# Patient Record
Sex: FEMALE | Race: WHITE | Employment: OTHER | URBAN - NONMETROPOLITAN AREA
[De-identification: names, ages, dates, MRNs, and addresses within clinical notes are randomized per-mention and may not be internally consistent; named-entity substitution may affect disease eponyms.]

---

## 2017-03-01 ENCOUNTER — HOSPITAL ENCOUNTER (EMERGENCY)
Facility: HOSPITAL | Age: 47
Discharge: HOME | End: 2017-03-01
Attending: PHYSICIAN ASSISTANT | Admitting: PHYSICIAN ASSISTANT
Payer: COMMERCIAL

## 2017-03-01 VITALS
DIASTOLIC BLOOD PRESSURE: 93 MMHG | HEART RATE: 89 BPM | TEMPERATURE: 98.6 F | RESPIRATION RATE: 18 BRPM | SYSTOLIC BLOOD PRESSURE: 173 MMHG | WEIGHT: 160 LBS | OXYGEN SATURATION: 95 % | BODY MASS INDEX: 27.31 KG/M2 | HEIGHT: 64 IN

## 2017-03-01 DIAGNOSIS — R10.31 RIGHT LOWER QUADRANT PAIN: ICD-10-CM

## 2017-03-01 DIAGNOSIS — K59.00 CONSTIPATION, UNSPECIFIED CONSTIPATION TYPE: ICD-10-CM

## 2017-03-01 PROCEDURE — 99283 EMERGENCY DEPT VISIT LOW MDM: CPT | Performed by: PHYSICIAN ASSISTANT

## 2017-03-01 PROCEDURE — 99283 EMERGENCY DEPT VISIT LOW MDM: CPT

## 2017-03-01 PROCEDURE — 74020 ZZHC X-RAY ABDOMEN COMPLETE: CPT | Mod: TC

## 2017-03-01 RX ORDER — OXYCODONE AND ACETAMINOPHEN 5; 325 MG/1; MG/1
1-2 TABLET ORAL EVERY 4 HOURS PRN
COMMUNITY
End: 2020-02-12

## 2017-03-01 ASSESSMENT — ENCOUNTER SYMPTOMS
NAUSEA: 0
ABDOMINAL DISTENTION: 0
ACTIVITY CHANGE: 0
ABDOMINAL PAIN: 1
FEVER: 0
DIARRHEA: 0
VOMITING: 0
CHILLS: 0
APPETITE CHANGE: 0
CONSTIPATION: 1

## 2017-03-01 NOTE — ED AVS SNAPSHOT
[facility]    [address]    [place] [address]    Phone:  [phone]                                       Veronica Ansari   MRN: [id number]    Department:  [facility]   Date of Visit:  3/1/2017           After Visit Summary Signature Page     I have received my discharge instructions, and my questions have been answered. I have discussed any challenges I see with this plan with the nurse or doctor.    ..........................................................................................................................................  Patient/Patient Representative Signature      ..........................................................................................................................................  Patient Representative Print Name and Relationship to Patient    ..................................................               ................................................  Date                                            Time    ..........................................................................................................................................  Reviewed by Signature/Title    ...................................................              ..............................................  Date                                                            Time

## 2017-03-02 NOTE — PLAN OF CARE
Opened chart per provider's request to call pt and update her that if no bm or worsening pain today to return here for ct. Pt agrees that she will do so if she has no bm or is still having abd pain.

## 2017-03-02 NOTE — ED NOTES
Pt doesn't want to wait for final read on abd xrays Ka will call them later with results.  Discharge instructions given to pt and .  They will return with any concerns or increase in pain.  Pt ambulatory with sling to left shoulder from rotator cuff repair done this Monday.

## 2017-03-02 NOTE — ED PROVIDER NOTES
History     Chief Complaint   Patient presents with     Constipation     shoulder surgery on Monday, no BM since then, has been taking stool softeners for 2 days, tried suppository today-no results     The history is provided by the patient.     Veronica Ansari is a 47 year old female who presented to the ED ambulatory for evaluation of constipation.  She had left shoulder surgery on Monday and has not had a BM in 3 days.  Has tried OTC stool softeners.  Developed some mild RLQ pain today.  No fevers.  No vomiting.  Passing gas.    Past Medical History   Diagnosis Date     Cervicalgia      Deviated nasal septum      Embolism and thrombosis (H)      unspecified site     Fibrocystic breast disease      Migraine      Prothrombin mutation (H)      Sinusitis       Past Surgical History   Procedure Laterality Date     Dilate cervix, hysteroscopy, ablate endometrium, combined N/A 5/11/2016     Procedure: COMBINED DILATE CERVIX, HYSTEROSCOPY, ABLATE ENDOMETRIUM;  Surgeon: Pacheco Curry MD;  Location: [facility]      Social History     Social History     Marital status:      Spouse name: N/A     Number of children: N/A     Years of education: N/A     Social History Main Topics     Smoking status: Former Smoker     Years: 3.00     Types: Cigarettes     Quit date: 8/22/1988     Smokeless tobacco: Never Used     Alcohol use 0.0 oz/week     0 Standard drinks or equivalent per week      Comment: Occassional     Drug use: No     Sexual activity: Not Asked     Other Topics Concern     None     Social History Narrative    No family history on file.    I have reviewed the Medications, Allergies, Past Medical and Surgical History, and Social History in the Epic system.    Review of Systems   Constitutional: Negative for activity change, appetite change, chills and fever.   Gastrointestinal: Positive for abdominal pain and constipation. Negative for abdominal distention, diarrhea, nausea and vomiting.   Genitourinary: Negative.   "      Physical Exam   BP: 173/93  Pulse: 89  Temp: 98.5  F (36.9  C)  Resp: 20  Height: 162.6 cm (5' 4\")  Weight: 72.6 kg (160 lb)  SpO2: 95 %  Physical Exam   Constitutional: She is oriented to person, place, and time. She appears well-developed and well-nourished. No distress.   Pleasant and talkative    Eyes: Conjunctivae and EOM are normal.   Cardiovascular: Normal rate and regular rhythm.    Pulmonary/Chest: Effort normal.   Abdominal: Soft. There is tenderness. There is no guarding.   Mild RLQ pain on deep palpation.  Otherwise unremarkable.    Neurological: She is alert and oriented to person, place, and time.   Skin: Skin is warm and dry.   Psychiatric: She has a normal mood and affect.   Nursing note and vitals reviewed.      ED Course     ED Course     Procedures             Critical Care time:  none               Labs Ordered and Resulted from Time of ED Arrival Up to the Time of Departure from the ED - No data to display    Assessments & Plan (with Medical Decision Making)   Abdominal imaging shows some enlarged bowel loops consistent with ileus.  Ms. Ansari tells me that she is passing flatus.  NO vomiting. Very mild right sided pain that is mild on deep palpation exam.  Gait is very smooth and she demonstrates no pain while conversing.  She almost certainly has opiate induced ileus/constipation.  I had a long and detailed discussion regarding her symptoms.  I discussed constipation at length.  Enema had little relief.  Mutually agreed to defer rectal.  Ms. Ansari looks well and does not appear to be in any discomfort.  She has no vomiting, she's passing gas, no distention, no fevers, etc.  I discussed the ease of simply ordering a CT scan.  This would rule out obstruction and appendicitis.  However, I do not believe that this would be in her best interest at this time in the ED.  Labs would offer no value.  She is postop and will almost certainly have leukocytosis.  Ms. Ansari completely agreed.  We " will trial mag citrate or miralax, and time. However, I stressed at length that she needed to return HERE for ANY worsening pain, fevers, changes in pain quality, location, or character, distention, loss of flatus, or simply return here for ANY other concerns or questions.     Discussed briefly with Dr. Camarena who agreed with the plan.     I have reviewed the nursing notes.    I have reviewed the findings, diagnosis, plan and need for follow up with the patient.    Discharge Medication List as of 3/1/2017  7:27 PM          Final diagnoses:   Constipation, unspecified constipation type   Right lower quadrant pain       3/1/2017   [facility]     Charito Duque PA-C  03/01/17 [id number]       Charito Duque PA-C  03/01/17 [id number]

## 2017-10-04 ENCOUNTER — TRANSFERRED RECORDS (OUTPATIENT)
Dept: HEALTH INFORMATION MANAGEMENT | Facility: CLINIC | Age: 47
End: 2017-10-04

## 2018-06-01 ENCOUNTER — TRANSFERRED RECORDS (OUTPATIENT)
Dept: HEALTH INFORMATION MANAGEMENT | Facility: HOSPITAL | Age: 48
End: 2018-06-01

## 2018-10-08 ENCOUNTER — TRANSFERRED RECORDS (OUTPATIENT)
Dept: HEALTH INFORMATION MANAGEMENT | Facility: CLINIC | Age: 48
End: 2018-10-08

## 2018-10-09 ENCOUNTER — TRANSFERRED RECORDS (OUTPATIENT)
Dept: HEALTH INFORMATION MANAGEMENT | Facility: CLINIC | Age: 48
End: 2018-10-09

## 2018-10-17 ENCOUNTER — HOSPITAL ENCOUNTER (OUTPATIENT)
Dept: NUCLEAR MEDICINE | Facility: HOSPITAL | Age: 48
End: 2018-10-17
Attending: FAMILY MEDICINE
Payer: COMMERCIAL

## 2018-10-17 ENCOUNTER — HOSPITAL ENCOUNTER (OUTPATIENT)
Dept: NUCLEAR MEDICINE | Facility: HOSPITAL | Age: 48
Discharge: HOME | End: 2018-10-17
Attending: FAMILY MEDICINE | Admitting: FAMILY MEDICINE
Payer: COMMERCIAL

## 2018-10-17 DIAGNOSIS — R14.0 POSTPRANDIAL ABDOMINAL BLOATING: ICD-10-CM

## 2018-10-17 PROCEDURE — A9537 TC99M MEBROFENIN: HCPCS | Performed by: RADIOLOGY

## 2018-10-17 PROCEDURE — 34300033 ZZH RX 343: Performed by: RADIOLOGY

## 2018-10-17 PROCEDURE — 78227 HEPATOBIL SYST IMAGE W/DRUG: CPT | Mod: TC

## 2018-10-17 RX ORDER — KIT FOR THE PREPARATION OF TECHNETIUM TC 99M MEBROFENIN 45 MG/10ML
5 INJECTION, POWDER, LYOPHILIZED, FOR SOLUTION INTRAVENOUS ONCE
Status: COMPLETED | OUTPATIENT
Start: 2018-10-17 | End: 2018-10-17

## 2018-10-17 RX ADMIN — MEBROFENIN 5 MILLICURIE: 45 INJECTION, POWDER, LYOPHILIZED, FOR SOLUTION INTRAVENOUS at 12:15

## 2019-03-12 ENCOUNTER — TRANSFERRED RECORDS (OUTPATIENT)
Dept: HEALTH INFORMATION MANAGEMENT | Facility: CLINIC | Age: 49
End: 2019-03-12

## 2019-05-03 ENCOUNTER — TRANSFERRED RECORDS (OUTPATIENT)
Dept: HEALTH INFORMATION MANAGEMENT | Facility: CLINIC | Age: 49
End: 2019-05-03

## 2019-10-10 ENCOUNTER — TRANSFERRED RECORDS (OUTPATIENT)
Dept: HEALTH INFORMATION MANAGEMENT | Facility: HOSPITAL | Age: 49
End: 2019-10-10

## 2019-10-10 ENCOUNTER — TRANSFERRED RECORDS (OUTPATIENT)
Dept: HEALTH INFORMATION MANAGEMENT | Facility: CLINIC | Age: 49
End: 2019-10-10

## 2019-10-10 LAB
CHOLEST SERPL-MCNC: 174 MG/DL (ref 114–200)
HDLC SERPL-MCNC: 47 MG/DL (ref 40–60)
LDLC SERPL CALC-MCNC: 100 MG/DL
PAP-ABSTRACT: NORMAL
TRIGL SERPL-MCNC: 136 MG/DL (ref 10–200)

## 2019-11-18 ENCOUNTER — TRANSFERRED RECORDS (OUTPATIENT)
Dept: HEALTH INFORMATION MANAGEMENT | Facility: CLINIC | Age: 49
End: 2019-11-18

## 2020-02-05 PROBLEM — K58.0 IRRITABLE BOWEL SYNDROME WITH DIARRHEA: Status: ACTIVE | Noted: 2017-11-14

## 2020-02-05 PROBLEM — Z98.890 S/P ABDOMINOPLASTY: Status: ACTIVE | Noted: 2018-04-02
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
## 2020-02-12 ENCOUNTER — OFFICE VISIT (OUTPATIENT)
Dept: FAMILY MEDICINE | Facility: OTHER | Age: 50
End: 2020-02-12
Attending: FAMILY MEDICINE
Payer: COMMERCIAL

## 2020-02-12 VITALS
HEART RATE: 70 BPM | TEMPERATURE: 97.7 F | DIASTOLIC BLOOD PRESSURE: 72 MMHG | SYSTOLIC BLOOD PRESSURE: 114 MMHG | WEIGHT: 168 LBS | BODY MASS INDEX: 28.68 KG/M2 | HEIGHT: 64 IN | OXYGEN SATURATION: 98 %

## 2020-02-12 DIAGNOSIS — K21.9 GASTROESOPHAGEAL REFLUX DISEASE, ESOPHAGITIS PRESENCE NOT SPECIFIED: ICD-10-CM

## 2020-02-12 DIAGNOSIS — J01.01 ACUTE RECURRENT MAXILLARY SINUSITIS: ICD-10-CM

## 2020-02-12 DIAGNOSIS — J32.9 CHRONIC CONGESTION OF PARANASAL SINUS: ICD-10-CM

## 2020-02-12 DIAGNOSIS — K58.0 IRRITABLE BOWEL SYNDROME WITH DIARRHEA: ICD-10-CM

## 2020-02-12 DIAGNOSIS — Z00.00 ENCOUNTER FOR MEDICAL EXAMINATION TO ESTABLISH CARE: Primary | ICD-10-CM

## 2020-02-12 PROCEDURE — 99203 OFFICE O/P NEW LOW 30 MIN: CPT | Performed by: FAMILY MEDICINE

## 2020-02-12 RX ORDER — FAMOTIDINE 20 MG/1
20 TABLET, FILM COATED ORAL
COMMUNITY
End: 2020-10-06

## 2020-02-12 RX ORDER — FLUTICASONE PROPIONATE 50 MCG
SPRAY, SUSPENSION (ML) NASAL
COMMUNITY
Start: 2019-12-08 | End: 2020-10-06

## 2020-02-12 ASSESSMENT — PAIN SCALES - GENERAL: PAINLEVEL: MILD PAIN (3)

## 2020-02-12 ASSESSMENT — MIFFLIN-ST. JEOR: SCORE: 1362.04

## 2020-02-12 NOTE — PATIENT INSTRUCTIONS
Add trial of fiber supplement - Metamucil, Citrucel.    Referral to ENT to evaluate the sinuses.  Complete antibiotic course.

## 2020-02-12 NOTE — NURSING NOTE
"Chief Complaint   Patient presents with     Establish Care       Initial /72 (BP Location: Right arm, Patient Position: Sitting, Cuff Size: Adult Regular)   Pulse 70   Temp 97.7  F (36.5  C) (Tympanic)   Ht 1.626 m (5' 4\")   Wt 76.2 kg (168 lb)   SpO2 98%   BMI 28.84 kg/m   Estimated body mass index is 28.84 kg/m  as calculated from the following:    Height as of this encounter: 1.626 m (5' 4\").    Weight as of this encounter: 76.2 kg (168 lb).  Medication Reconciliation: complete  Bertha Gary LPN  "

## 2020-02-19 ENCOUNTER — OFFICE VISIT (OUTPATIENT)
Dept: OTOLARYNGOLOGY | Facility: OTHER | Age: 50
End: 2020-02-19
Attending: FAMILY MEDICINE
Payer: COMMERCIAL

## 2020-02-19 VITALS
TEMPERATURE: 97.8 F | HEIGHT: 64 IN | WEIGHT: 168 LBS | OXYGEN SATURATION: 97 % | DIASTOLIC BLOOD PRESSURE: 78 MMHG | BODY MASS INDEX: 28.68 KG/M2 | HEART RATE: 93 BPM | SYSTOLIC BLOOD PRESSURE: 112 MMHG

## 2020-02-19 DIAGNOSIS — J32.9 CHRONIC CONGESTION OF PARANASAL SINUS: ICD-10-CM

## 2020-02-19 DIAGNOSIS — J34.2 NASAL SEPTAL DEVIATION: Primary | ICD-10-CM

## 2020-02-19 PROCEDURE — 99213 OFFICE O/P EST LOW 20 MIN: CPT | Mod: 25 | Performed by: NURSE PRACTITIONER

## 2020-02-19 PROCEDURE — 31231 NASAL ENDOSCOPY DX: CPT | Performed by: NURSE PRACTITIONER

## 2020-02-19 RX ORDER — FLUTICASONE PROPIONATE 50 MCG
2 SPRAY, SUSPENSION (ML) NASAL DAILY
Qty: 18.2 ML | Refills: 3 | Status: SHIPPED | OUTPATIENT
Start: 2020-02-19 | End: 2020-03-06

## 2020-02-19 RX ORDER — BUDESONIDE 0.5 MG/2ML
INHALANT ORAL
Qty: 1 BOX | Refills: 1 | Status: SHIPPED | OUTPATIENT
Start: 2020-02-19 | End: 2020-10-06

## 2020-02-19 ASSESSMENT — MIFFLIN-ST. JEOR: SCORE: 1357.04

## 2020-02-19 ASSESSMENT — PAIN SCALES - GENERAL: PAINLEVEL: MODERATE PAIN (4)

## 2020-02-19 NOTE — PATIENT INSTRUCTIONS
Budesonide rinses:  Budesonide nasal saline irrigation per instructions:  -Obtain Leif Med Sinus rinse over the counter.    -Use warm distilled water and 2 packets of the salt solution that comes with the bottle, dissolve in bottle up to the 240 mL garrett.  -Add 1 vial of budesonide.  -Irrigate each side of your nose leaning over the sink, using 1/3 to 1/2 the volume of the bottle in each nostril every irrigation.  Irrigate 2 times daily.  -If additional rinses are needed/recommended, you may use the plan Leif Med Sinus irrigation without the use of added budesonide.     Continue flonase  2 spray 1 time daily
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
## 2020-02-19 NOTE — NURSING NOTE
"Chief Complaint   Patient presents with     Sinusitis     Patient referred by Dr. KIKO Ruiz for chronic congestion of paranasal sinus       Initial /78   Pulse 93   Temp 97.8  F (36.6  C) (Tympanic)   Ht 1.626 m (5' 4\")   Wt 76.2 kg (168 lb)   SpO2 97%   BMI 28.84 kg/m   Estimated body mass index is 28.84 kg/m  as calculated from the following:    Height as of this encounter: 1.626 m (5' 4\").    Weight as of this encounter: 76.2 kg (168 lb).  Medication Reconciliation: complete  Tammy Kendall LPN  "
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
## 2020-03-03 ENCOUNTER — TELEPHONE (OUTPATIENT)
Dept: OTOLARYNGOLOGY | Facility: OTHER | Age: 50
End: 2020-03-03

## 2020-03-03 ENCOUNTER — HOSPITAL ENCOUNTER (OUTPATIENT)
Dept: CT IMAGING | Facility: HOSPITAL | Age: 50
Discharge: HOME | End: 2020-03-03
Attending: NURSE PRACTITIONER | Admitting: NURSE PRACTITIONER
Payer: COMMERCIAL

## 2020-03-03 DIAGNOSIS — J32.9 CHRONIC CONGESTION OF PARANASAL SINUS: ICD-10-CM

## 2020-03-03 PROCEDURE — 70486 CT MAXILLOFACIAL W/O DYE: CPT | Mod: TC

## 2020-03-03 NOTE — TELEPHONE ENCOUNTER
Carol,    Left message with patient to follow up with Dr Richardson in a surgical consult for septoplasty.    Carie

## 2020-03-04 ENCOUNTER — MYC MEDICAL ADVICE (OUTPATIENT)
Dept: FAMILY MEDICINE | Facility: OTHER | Age: 50
End: 2020-03-04

## 2020-03-04 ENCOUNTER — TELEPHONE (OUTPATIENT)
Dept: FAMILY MEDICINE | Facility: OTHER | Age: 50
End: 2020-03-04

## 2020-03-04 NOTE — TELEPHONE ENCOUNTER
Left msg on personal voice mail to call back r/t how sinuses are and if needing a different antibiotic from . Bertha Gary LPN

## 2020-03-04 NOTE — TELEPHONE ENCOUNTER
Called pt and confirmed sinuses are better but pt is still having the side effects of loose stools from the one prior. Pt wondering what she should do. Please advise. Thank you. Bertha Gary LPN

## 2020-03-06 ENCOUNTER — OFFICE VISIT (OUTPATIENT)
Dept: FAMILY MEDICINE | Facility: OTHER | Age: 50
End: 2020-03-06
Attending: FAMILY MEDICINE
Payer: COMMERCIAL

## 2020-03-06 VITALS
WEIGHT: 165 LBS | TEMPERATURE: 97.5 F | BODY MASS INDEX: 28.17 KG/M2 | RESPIRATION RATE: 19 BRPM | HEART RATE: 71 BPM | SYSTOLIC BLOOD PRESSURE: 126 MMHG | DIASTOLIC BLOOD PRESSURE: 70 MMHG | HEIGHT: 64 IN | OXYGEN SATURATION: 99 %

## 2020-03-06 DIAGNOSIS — R19.7 DIARRHEA, UNSPECIFIED TYPE: Primary | ICD-10-CM

## 2020-03-06 PROCEDURE — 99213 OFFICE O/P EST LOW 20 MIN: CPT | Performed by: FAMILY MEDICINE

## 2020-03-06 ASSESSMENT — MIFFLIN-ST. JEOR: SCORE: 1343.44

## 2020-03-06 ASSESSMENT — PAIN SCALES - GENERAL: PAINLEVEL: MILD PAIN (2)

## 2020-03-06 NOTE — PATIENT INSTRUCTIONS
Return kit for c diff testing.  Probiotic, anti-diarrheal, bland diet, hydration.  Follow up as needed.

## 2020-03-06 NOTE — PROGRESS NOTES
Subjective     Veronica Ansari is a 50 year old female who presents to clinic today for the following health issues:    HPI   Diarrhea      Duration: ongoing     Description:       Consistency of stool: pasty and formed       Blood in stool: no        Number of loose stools past 24 hours: 3    Intensity:  mild, 2/10    Accompanying signs and symptoms:       Fever: no        Nausea/vomitting: no        Abdominal pain: YES       Weight loss: no     History (recent antibiotics or travel/ill contacts/med changes/testing done): no    Precipitating or alleviating factors: None    Therapies tried and outcome: Imodium right ear      Treated 2/12/20 with Augmentin for sinusitis; then saw ENT    Started antibiotics, started diarrhea with 3 days    Did not finish antibiotics, 5 pills remaining, sinusitis resolved    Diarrhea became worse, 6-8 times daily; foul smelling    Immodium while on vacation    No blood, tarry, black    Still 6 times daily, stool is more firm, not a lot of volume    Increased gas    RUQ/RLQ Abdominal pain before bowel, relieved with bowel movement    Urination normal    History of diverticulitis - does not feel the same    Elimination diet prior to onset of symptoms with GI improvement           Current Outpatient Medications   Medication     Biotin 61470 MCG TBDP     famotidine (PEPCID) 20 MG tablet     budesonide 0.5 MG/2ML IN neb solution     fluticasone (FLONASE) 50 MCG/ACT nasal spray     No current facility-administered medications for this visit.        Patient Active Problem List   Diagnosis     Excessive or frequent menstruation     Sinusitis, chronic     Prothrombin [id number] mutation (H)     S/P abdominoplasty     Irritable bowel syndrome with diarrhea     Iron deficiency     H/O deep venous thrombosis     Cervicalgia     Deviated nasal septum     Fibrocystic breast disease     Past Surgical History:   Procedure Laterality Date     ABDOMINOPLASTY       C BREAST AUGMENTATION       C LAP  "CHOLECYSTOSOMY        SECTION      x2     COLONOSCOPY - HIM SCAN  2011    Colonoscopy 2011     COLONOSCOPY - HIM SCAN  2018    Procedure:  Colonoscopy diagnostic with polypectomy and biopsies;  Surgeon:  Danelle Swift MD;  Location:  [facility] OR     COLONOSCOPY - HIM SCAN  2007    Colonoscopy, flex, diagnostic     DILATE CERVIX, HYSTEROSCOPY, ABLATE ENDOMETRIUM, COMBINED N/A 2016    Procedure: COMBINED DILATE CERVIX, HYSTEROSCOPY, ABLATE ENDOMETRIUM;  Surgeon: Pacheco Curry MD;  Location: [facility] OR     ESOPHAGOGASTRODUODENOSCOPY      negative       Social History     Tobacco Use     Smoking status: Former Smoker     Years: 3.00     Types: Cigarettes     Last attempt to quit: 1988     Years since quittin.5     Smokeless tobacco: Never Used   Substance Use Topics     Alcohol use: Yes     Alcohol/week: 0.0 standard drinks     Comment: Occassional     No family history on file.          Reviewed and updated as needed this visit by Provider         Review of Systems   ROS COMP: Constitutional, HEENT, cardiovascular, pulmonary, gi and gu systems are negative, except as otherwise noted.      Objective    /70   Pulse 71   Temp 97.5  F (36.4  C) (Tympanic)   Resp 19   Ht 1.626 m (5' 4\")   Wt 74.8 kg (165 lb)   SpO2 99%   BMI 28.32 kg/m    Body mass index is 28.32 kg/m .  Physical Exam   GENERAL: healthy, alert and no distress  NECK: no adenopathy, no asymmetry, masses, or scars and thyroid normal to palpation  RESP: lungs clear to auscultation - no rales, rhonchi or wheezes  CV: regular rate and rhythm, normal S1 S2, no S3 or S4, no murmur, click or rub, no peripheral edema and peripheral pulses strong  ABDOMEN: no organomegaly or masses, bowel sounds normal and mild tenderness RLQ; no rebound or guarding  MS: no gross musculoskeletal defects noted, no edema  SKIN: no suspicious lesions or rashes  PSYCH: mentation appears normal, affect normal/bright    Diagnostic Test " "Results:  C diff pending        Assessment & Plan       ICD-10-CM    1. Diarrhea, unspecified type R19.7 Clostridium difficile Toxin B PCR        BMI:   Estimated body mass index is 28.32 kg/m  as calculated from the following:    Height as of this encounter: 1.626 m (5' 4\").    Weight as of this encounter: 74.8 kg (165 lb).     Likely IBS with addition of antibiotic associated diarrhea.  Less likely C diff.      Patient Instructions   Return kit for c diff testing.  Probiotic, anti-diarrheal, bland diet, hydration.  Follow up as needed.      No follow-ups on file.    Jenelle Rivera MD  [facility] [place]        "

## 2020-03-06 NOTE — NURSING NOTE
"Chief Complaint   Patient presents with     Diarrhea       Initial /70   Pulse 71   Temp 97.5  F (36.4  C) (Tympanic)   Resp 19   Ht 1.626 m (5' 4\")   Wt 74.8 kg (165 lb)   SpO2 99%   BMI 28.32 kg/m   Estimated body mass index is 28.32 kg/m  as calculated from the following:    Height as of this encounter: 1.626 m (5' 4\").    Weight as of this encounter: 74.8 kg (165 lb).  Medication Reconciliation: complete  Sofia Soliman LPN    "

## 2020-03-11 ENCOUNTER — TELEPHONE (OUTPATIENT)
Dept: FAMILY MEDICINE | Facility: OTHER | Age: 50
End: 2020-03-11

## 2020-03-11 NOTE — TELEPHONE ENCOUNTER
Received letter from Dr. Cee, [facility].  Patient with history of recurrent alternating subconjunctival hemorrhages.  Possibly related to anemia.  Less frequent when on iron supplement.  Advising additional lab evaluation for other bleeding disorders as well.  Please notify patient.  Can follow up in clinic to discuss additional lab testing at her convenience.

## 2020-03-12 ENCOUNTER — OFFICE VISIT (OUTPATIENT)
Dept: FAMILY MEDICINE | Facility: OTHER | Age: 50
End: 2020-03-12
Attending: FAMILY MEDICINE
Payer: COMMERCIAL

## 2020-03-12 ENCOUNTER — TELEPHONE (OUTPATIENT)
Dept: FAMILY MEDICINE | Facility: OTHER | Age: 50
End: 2020-03-12

## 2020-03-12 VITALS
RESPIRATION RATE: 17 BRPM | HEIGHT: 64 IN | BODY MASS INDEX: 28.85 KG/M2 | DIASTOLIC BLOOD PRESSURE: 87 MMHG | SYSTOLIC BLOOD PRESSURE: 118 MMHG | HEART RATE: 84 BPM | OXYGEN SATURATION: 99 % | TEMPERATURE: 97.2 F | WEIGHT: 169 LBS

## 2020-03-12 DIAGNOSIS — R19.7 DIARRHEA, UNSPECIFIED TYPE: ICD-10-CM

## 2020-03-12 DIAGNOSIS — H11.33 SUBCONJUNCTIVAL HEMORRHAGE, BILATERAL: Primary | ICD-10-CM

## 2020-03-12 DIAGNOSIS — A04.72 C. DIFFICILE COLITIS: ICD-10-CM

## 2020-03-12 LAB
APTT PPP: 28 SEC (ref 22–37)
BASOPHILS # BLD AUTO: 0.1 10E9/L (ref 0–0.2)
BASOPHILS NFR BLD AUTO: 0.5 %
C DIFF TOX B STL QL: POSITIVE
DIFFERENTIAL METHOD BLD: ABNORMAL
EOSINOPHIL # BLD AUTO: 0.1 10E9/L (ref 0–0.7)
EOSINOPHIL NFR BLD AUTO: 0.7 %
ERYTHROCYTE [DISTWIDTH] IN BLOOD BY AUTOMATED COUNT: 12.5 % (ref 10–15)
HCT VFR BLD AUTO: 43.3 % (ref 35–47)
HGB BLD-MCNC: 14.6 G/DL (ref 11.7–15.7)
IMM GRANULOCYTES # BLD: 0.1 10E9/L (ref 0–0.4)
IMM GRANULOCYTES NFR BLD: 0.6 %
INR PPP: 1.22 (ref 0.86–1.14)
LYMPHOCYTES # BLD AUTO: 1.8 10E9/L (ref 0.8–5.3)
LYMPHOCYTES NFR BLD AUTO: 14 %
MCH RBC QN AUTO: 30.3 PG (ref 26.5–33)
MCHC RBC AUTO-ENTMCNC: 33.7 G/DL (ref 31.5–36.5)
MCV RBC AUTO: 90 FL (ref 78–100)
MONOCYTES # BLD AUTO: 1 10E9/L (ref 0–1.3)
MONOCYTES NFR BLD AUTO: 7.6 %
NEUTROPHILS # BLD AUTO: 10.1 10E9/L (ref 1.6–8.3)
NEUTROPHILS NFR BLD AUTO: 76.6 %
NRBC # BLD AUTO: 0 10*3/UL
NRBC BLD AUTO-RTO: 0 /100
PLATELET # BLD AUTO: 365 10E9/L (ref 150–450)
RBC # BLD AUTO: 4.82 10E12/L (ref 3.8–5.2)
RETICS # AUTO: 69.9 10E9/L (ref 25–95)
RETICS/RBC NFR AUTO: 1.5 % (ref 0.5–2)
SPECIMEN SOURCE: ABNORMAL
WBC # BLD AUTO: 13.1 10E9/L (ref 4–11)

## 2020-03-12 PROCEDURE — 85025 COMPLETE CBC W/AUTO DIFF WBC: CPT | Performed by: FAMILY MEDICINE

## 2020-03-12 PROCEDURE — 40000847 ZZHCL STATISTIC MORPHOLOGY W/INTERP HISTOLOGY TC 85060: Performed by: FAMILY MEDICINE

## 2020-03-12 PROCEDURE — 99213 OFFICE O/P EST LOW 20 MIN: CPT | Performed by: FAMILY MEDICINE

## 2020-03-12 PROCEDURE — 85045 AUTOMATED RETICULOCYTE COUNT: CPT | Performed by: FAMILY MEDICINE

## 2020-03-12 PROCEDURE — 87493 C DIFF AMPLIFIED PROBE: CPT | Performed by: FAMILY MEDICINE

## 2020-03-12 PROCEDURE — 36415 COLL VENOUS BLD VENIPUNCTURE: CPT | Performed by: FAMILY MEDICINE

## 2020-03-12 PROCEDURE — 85610 PROTHROMBIN TIME: CPT | Performed by: FAMILY MEDICINE

## 2020-03-12 PROCEDURE — 85730 THROMBOPLASTIN TIME PARTIAL: CPT | Performed by: FAMILY MEDICINE

## 2020-03-12 RX ORDER — VANCOMYCIN HYDROCHLORIDE 250 MG/1
250 CAPSULE ORAL 4 TIMES DAILY
Qty: 40 CAPSULE | Refills: 0 | Status: SHIPPED | OUTPATIENT
Start: 2020-03-12 | End: 2020-03-22

## 2020-03-12 ASSESSMENT — PAIN SCALES - GENERAL: PAINLEVEL: NO PAIN (0)

## 2020-03-12 ASSESSMENT — MIFFLIN-ST. JEOR: SCORE: 1361.58

## 2020-03-12 NOTE — PATIENT INSTRUCTIONS
Will call with lab results.  Consider repeat hematology consult.  Attempt to find out more history about sisters eye/stroke?

## 2020-03-12 NOTE — PROGRESS NOTES
"Subjective     Veronica Ansari is a 50 year old female who presents to clinic today for the following health issues:    HPI   Eye(s) Problem    Duration: years maybe 5    Description:  Location: bilateral - alternates sides; not at same time  Pain: no  Redness: YES  Discharge: no    Accompanying signs and symptoms: broken blood vessels    History (Trauma, foreign body exposure,): yes, recently saw eye doctor - instructed to be seen for further coagulation work up    Precipitating or alleviating factors (contact use): None    Therapies tried and outcome: None    Blood clot in calf 12 years ago post surgical; 3 month of blood thinners; Persisting pain in calf    Occur every couple months    No eye contacts    Some pressure behind left eye, comes and goes    Randomly throughout the day    Strains with bowel movements once in a while. Not correlated with symptoms    Often times no precipitating factors, this time was out scraping driveway    Prothrombin time gene mutation from fathers side; did 23 and me; aware she has this mutation    Sister had \"eye stroke\" recently; related to hypertension; unclear details; was it a CVA?  A bleed in an eye vessel?    Grandmother had diabetes on fathers side, father pre-diabetic    No easy bruising, bleeding    No blood in urine    No blood in stool        Current Outpatient Medications   Medication     Biotin 43574 MCG TBDP     budesonide 0.5 MG/2ML IN neb solution     famotidine (PEPCID) 20 MG tablet     fluticasone (FLONASE) 50 MCG/ACT nasal spray     vancomycin (VANCOCIN) 250 MG capsule     No current facility-administered medications for this visit.        Patient Active Problem List   Diagnosis     Excessive or frequent menstruation     Sinusitis, chronic     Prothrombin [id number] mutation (H)     S/P abdominoplasty     Irritable bowel syndrome with diarrhea     Iron deficiency     H/O deep venous thrombosis     Cervicalgia     Deviated nasal septum     Fibrocystic breast disease " "    Past Surgical History:   Procedure Laterality Date     ABDOMINOPLASTY       C BREAST AUGMENTATION       C LAP CHOLECYSTOSOMY        SECTION      x2     COLONOSCOPY - HIM SCAN  2011    Colonoscopy 2011     COLONOSCOPY - HIM SCAN  2018    Procedure:  Colonoscopy diagnostic with polypectomy and biopsies;  Surgeon:  Danelle Swift MD;  Location:  [facility] OR     COLONOSCOPY - HIM SCAN  2007    Colonoscopy, flex, diagnostic     DILATE CERVIX, HYSTEROSCOPY, ABLATE ENDOMETRIUM, COMBINED N/A 2016    Procedure: COMBINED DILATE CERVIX, HYSTEROSCOPY, ABLATE ENDOMETRIUM;  Surgeon: Pacheco Curry MD;  Location: [facility] OR     ESOPHAGOGASTRODUODENOSCOPY      negative       Social History     Tobacco Use     Smoking status: Former Smoker     Years: 3.00     Types: Cigarettes     Last attempt to quit: 1988     Years since quittin.5     Smokeless tobacco: Never Used   Substance Use Topics     Alcohol use: Yes     Alcohol/week: 0.0 standard drinks     Comment: Occassional     History reviewed. No pertinent family history.        Reviewed and updated as needed this visit by Provider         Review of Systems   ROS COMP: Constitutional, HEENT, cardiovascular, pulmonary, gi and gu systems are negative, except as otherwise noted.      Objective    /87 (BP Location: Right arm, Patient Position: Sitting, Cuff Size: Adult Regular)   Pulse 84   Temp 97.2  F (36.2  C) (Tympanic)   Resp 17   Ht 1.626 m (5' 4\")   Wt 76.7 kg (169 lb)   SpO2 99%   BMI 29.01 kg/m    Body mass index is 29.01 kg/m .  Physical Exam   GENERAL: healthy, alert and no distress  EYES: PERRL, EOMI and right eye conjunctiva with small subconjunctival hemorrhage, few mm in size  NECK: no adenopathy, no asymmetry, masses, or scars and thyroid normal to palpation  RESP: lungs clear to auscultation - no rales, rhonchi or wheezes  CV: regular rate and rhythm, normal S1 S2, no S3 or S4, no murmur, click or rub, no peripheral " "edema and peripheral pulses strong  ABDOMEN: soft, nontender, no hepatosplenomegaly, no masses and bowel sounds normal  MS: no gross musculoskeletal defects noted, no edema  SKIN: no suspicious lesions or rashes    Diagnostic Test Results:  Labs reviewed in Epic        Assessment & Plan       ICD-10-CM    1. Subconjunctival hemorrhage, bilateral  H11.33 CBC with platelets and differential     Partial thromboplastin time     INR     Blood Morphology Pathologist Review     Reticulocyte Count   2. C. difficile colitis  A04.72 vancomycin (VANCOCIN) 250 MG capsule        BMI:   Estimated body mass index is 29.01 kg/m  as calculated from the following:    Height as of this encounter: 1.626 m (5' 4\").    Weight as of this encounter: 76.7 kg (169 lb).     Obtain baseline screenings PT/INR/CBC, add peripheral smear.      Patient Instructions   Will call with lab results.  Consider repeat hematology consult.  Attempt to find out more history about sisters eye/stroke?    Call from lab - c diff positive.  Patient notified.  Started on vancomycin.    No follow-ups on file.    Jenelle Rivera MD  [facility] [place]        "

## 2020-03-12 NOTE — TELEPHONE ENCOUNTER
DATE:  3/12/2020   TIME OF RECEIPT FROM LAB:  10:08am   LAB TEST:  C-Diff  LAB VALUE:  Positive   RESULTS GIVEN WITH READ-BACK TO (PROVIDER):  Dr. Ruiz  TIME LAB VALUE REPORTED TO PROVIDER:   10:12 AM

## 2020-03-12 NOTE — NURSING NOTE
"Chief Complaint   Patient presents with     Eye Problem       Initial /87 (BP Location: Right arm, Patient Position: Sitting, Cuff Size: Adult Regular)   Pulse 84   Temp 97.2  F (36.2  C) (Tympanic)   Resp 17   Ht 1.626 m (5' 4\")   Wt 76.7 kg (169 lb)   SpO2 99%   BMI 29.01 kg/m   Estimated body mass index is 29.01 kg/m  as calculated from the following:    Height as of this encounter: 1.626 m (5' 4\").    Weight as of this encounter: 76.7 kg (169 lb).  Medication Reconciliation: complete  Cris Farias LPN  "

## 2020-03-13 LAB — COPATH REPORT: NORMAL

## 2020-03-18 ENCOUNTER — MYC MEDICAL ADVICE (OUTPATIENT)
Dept: FAMILY MEDICINE | Facility: OTHER | Age: 50
End: 2020-03-18

## 2020-03-18 NOTE — TELEPHONE ENCOUNTER
Pt mychart message:  I have developed a red area on my right wrist.  I also have experienced some intermittent itching on the inside of the same wrist.  On the Vancomycin side effects it says to let your doctor know if you develop a rash.    Hopefully this is just due to the increased hand washing that I've been doing lately.    The knuckles that appear red in the picture are often like that due to dry skin in the winter.    Please advise.   Bertha Gary LPN

## 2020-03-18 NOTE — TELEPHONE ENCOUNTER
Yes - if just localized - treat with topical steroid cream such as 1% cortisone and good moisturizer, such as Cetaphil, Vanicream, Eucerin.    The rash from vancomycin would be more generalized/diffuse.  If progressing/changing, please keep us updated.

## 2020-03-22 ENCOUNTER — HOSPITAL ENCOUNTER (EMERGENCY)
Facility: HOSPITAL | Age: 50
Discharge: HOME | End: 2020-03-23
Attending: EMERGENCY MEDICINE
Payer: COMMERCIAL

## 2020-03-22 ENCOUNTER — APPOINTMENT (OUTPATIENT)
Dept: GENERAL RADIOLOGY | Facility: HOSPITAL | Age: 50
End: 2020-03-22
Attending: EMERGENCY MEDICINE
Payer: COMMERCIAL

## 2020-03-22 ENCOUNTER — APPOINTMENT (OUTPATIENT)
Dept: CT IMAGING | Facility: HOSPITAL | Age: 50
End: 2020-03-22
Attending: EMERGENCY MEDICINE
Payer: COMMERCIAL

## 2020-03-22 DIAGNOSIS — T18.9XXA FOREIGN BODY IN DIGESTIVE TRACT, INITIAL ENCOUNTER: ICD-10-CM

## 2020-03-22 LAB
ANION GAP SERPL CALCULATED.3IONS-SCNC: 5 MMOL/L (ref 3–14)
BASOPHILS # BLD AUTO: 0 10E9/L (ref 0–0.2)
BASOPHILS NFR BLD AUTO: 0.4 %
BUN SERPL-MCNC: 13 MG/DL (ref 7–30)
CALCIUM SERPL-MCNC: 8.5 MG/DL (ref 8.5–10.1)
CHLORIDE SERPL-SCNC: 107 MMOL/L (ref 94–109)
CO2 SERPL-SCNC: 27 MMOL/L (ref 20–32)
CREAT SERPL-MCNC: 0.76 MG/DL (ref 0.52–1.04)
D DIMER PPP FEU-MCNC: 0.4 UG/ML FEU (ref 0–0.5)
DIFFERENTIAL METHOD BLD: ABNORMAL
EOSINOPHIL # BLD AUTO: 0.2 10E9/L (ref 0–0.7)
EOSINOPHIL NFR BLD AUTO: 1.3 %
ERYTHROCYTE [DISTWIDTH] IN BLOOD BY AUTOMATED COUNT: 12.6 % (ref 10–15)
GFR SERPL CREATININE-BSD FRML MDRD: 89 ML/MIN/{1.73_M2}
GLUCOSE SERPL-MCNC: 128 MG/DL (ref 70–99)
HCT VFR BLD AUTO: 38.3 % (ref 35–47)
HGB BLD-MCNC: 12.9 G/DL (ref 11.7–15.7)
IMM GRANULOCYTES # BLD: 0.1 10E9/L (ref 0–0.4)
IMM GRANULOCYTES NFR BLD: 0.7 %
INR PPP: 1.24 (ref 0.86–1.14)
LYMPHOCYTES # BLD AUTO: 2.7 10E9/L (ref 0.8–5.3)
LYMPHOCYTES NFR BLD AUTO: 24.1 %
MCH RBC QN AUTO: 29.9 PG (ref 26.5–33)
MCHC RBC AUTO-ENTMCNC: 33.7 G/DL (ref 31.5–36.5)
MCV RBC AUTO: 89 FL (ref 78–100)
MONOCYTES # BLD AUTO: 0.9 10E9/L (ref 0–1.3)
MONOCYTES NFR BLD AUTO: 8.4 %
NEUTROPHILS # BLD AUTO: 7.3 10E9/L (ref 1.6–8.3)
NEUTROPHILS NFR BLD AUTO: 65.1 %
NRBC # BLD AUTO: 0 10*3/UL
NRBC BLD AUTO-RTO: 0 /100
PLATELET # BLD AUTO: 303 10E9/L (ref 150–450)
POTASSIUM SERPL-SCNC: 3.5 MMOL/L (ref 3.4–5.3)
RBC # BLD AUTO: 4.31 10E12/L (ref 3.8–5.2)
SODIUM SERPL-SCNC: 139 MMOL/L (ref 133–144)
TROPONIN I SERPL-MCNC: <0.015 UG/L (ref 0–0.04)
WBC # BLD AUTO: 11.2 10E9/L (ref 4–11)

## 2020-03-22 PROCEDURE — 93010 ELECTROCARDIOGRAM REPORT: CPT | Performed by: INTERNAL MEDICINE

## 2020-03-22 PROCEDURE — 71260 CT THORAX DX C+: CPT | Mod: TC

## 2020-03-22 PROCEDURE — 99285 EMERGENCY DEPT VISIT HI MDM: CPT | Mod: 25

## 2020-03-22 PROCEDURE — 25500064 ZZH RX 255 OP 636: Performed by: EMERGENCY MEDICINE

## 2020-03-22 PROCEDURE — 96374 THER/PROPH/DIAG INJ IV PUSH: CPT | Mod: XU

## 2020-03-22 PROCEDURE — 85379 FIBRIN DEGRADATION QUANT: CPT | Performed by: EMERGENCY MEDICINE

## 2020-03-22 PROCEDURE — 25000132 ZZH RX MED GY IP 250 OP 250 PS 637: Performed by: EMERGENCY MEDICINE

## 2020-03-22 PROCEDURE — 80048 BASIC METABOLIC PNL TOTAL CA: CPT | Performed by: EMERGENCY MEDICINE

## 2020-03-22 PROCEDURE — 25000125 ZZHC RX 250: Performed by: EMERGENCY MEDICINE

## 2020-03-22 PROCEDURE — 36415 COLL VENOUS BLD VENIPUNCTURE: CPT | Performed by: EMERGENCY MEDICINE

## 2020-03-22 PROCEDURE — 85610 PROTHROMBIN TIME: CPT | Performed by: EMERGENCY MEDICINE

## 2020-03-22 PROCEDURE — 25000128 H RX IP 250 OP 636: Performed by: EMERGENCY MEDICINE

## 2020-03-22 PROCEDURE — 85025 COMPLETE CBC W/AUTO DIFF WBC: CPT | Performed by: EMERGENCY MEDICINE

## 2020-03-22 PROCEDURE — 99285 EMERGENCY DEPT VISIT HI MDM: CPT | Mod: Z6 | Performed by: EMERGENCY MEDICINE

## 2020-03-22 PROCEDURE — 71046 X-RAY EXAM CHEST 2 VIEWS: CPT | Mod: TC

## 2020-03-22 PROCEDURE — 84484 ASSAY OF TROPONIN QUANT: CPT | Performed by: EMERGENCY MEDICINE

## 2020-03-22 PROCEDURE — 93005 ELECTROCARDIOGRAM TRACING: CPT

## 2020-03-22 RX ORDER — IOPAMIDOL 612 MG/ML
100 INJECTION, SOLUTION INTRAVASCULAR ONCE
Status: COMPLETED | OUTPATIENT
Start: 2020-03-22 | End: 2020-03-22

## 2020-03-22 RX ADMIN — IOPAMIDOL 100 ML: 612 INJECTION, SOLUTION INTRAVENOUS at 23:59

## 2020-03-22 RX ADMIN — GLUCAGON HYDROCHLORIDE 1 MG: KIT at 22:01

## 2020-03-22 RX ADMIN — LIDOCAINE HYDROCHLORIDE 30 ML: 20 SOLUTION ORAL; TOPICAL at 22:59

## 2020-03-22 ASSESSMENT — ENCOUNTER SYMPTOMS
PSYCHIATRIC NEGATIVE: 1
ENDOCRINE NEGATIVE: 1
ALLERGIC/IMMUNOLOGIC NEGATIVE: 1
VOMITING: 0
HEMATOLOGIC/LYMPHATIC NEGATIVE: 1
FEVER: 0
CONSTITUTIONAL NEGATIVE: 1
DIZZINESS: 0
NUMBNESS: 0
NEUROLOGICAL NEGATIVE: 1
EYES NEGATIVE: 1
SHORTNESS OF BREATH: 0
SLEEP DISTURBANCE: 0
NAUSEA: 0
MUSCULOSKELETAL NEGATIVE: 1
WEAKNESS: 0
NERVOUS/ANXIOUS: 0
GASTROINTESTINAL NEGATIVE: 1
RESPIRATORY NEGATIVE: 1

## 2020-03-22 NOTE — ED AVS SNAPSHOT
[facility]  [address]  [place] [address]  Phone:  [phone]                                    Veronica Ansari   MRN: [id number]    Department:  [facility]   Date of Visit:  3/22/2020           After Visit Summary Signature Page    I have received my discharge instructions, and my questions have been answered. I have discussed any challenges I see with this plan with the nurse or doctor.    ..........................................................................................................................................  Patient/Patient Representative Signature      ..........................................................................................................................................  Patient Representative Print Name and Relationship to Patient    ..................................................               ................................................  Date                                   Time    ..........................................................................................................................................  Reviewed by Signature/Title    ...................................................              ..............................................  Date                                               Time          22EPIC Rev 08/18

## 2020-03-23 ENCOUNTER — HOSPITAL ENCOUNTER (OUTPATIENT)
Dept: GENERAL RADIOLOGY | Facility: HOSPITAL | Age: 50
End: 2020-03-23
Attending: EMERGENCY MEDICINE
Payer: COMMERCIAL

## 2020-03-23 VITALS
TEMPERATURE: 97.9 F | DIASTOLIC BLOOD PRESSURE: 91 MMHG | OXYGEN SATURATION: 96 % | SYSTOLIC BLOOD PRESSURE: 151 MMHG | RESPIRATION RATE: 14 BRPM | HEART RATE: 77 BPM | BODY MASS INDEX: 28.32 KG/M2 | WEIGHT: 165 LBS

## 2020-03-23 PROCEDURE — 25500045 ZZH RX 255: Performed by: EMERGENCY MEDICINE

## 2020-03-23 PROCEDURE — 74220 X-RAY XM ESOPHAGUS 1CNTRST: CPT | Mod: TC

## 2020-03-23 RX ADMIN — ANTACID/ANTIFLATULENT 4 G: 380; 550; 10; 10 GRANULE, EFFERVESCENT ORAL at 09:05

## 2020-03-23 NOTE — DISCHARGE INSTRUCTIONS
You will be contacted to have an esophagram    If you have not heard from them today, call [phone] to schedule an appointment    You must follow up with your doctor for referral to Hematology due to your INR    Follow the aftercare instructions provided.     What to expect when you have contrast    During your exam, we will inject  contrast  into your vein or artery. (Contrast is a clear liquid with iodine in it. It shows up on X-rays.)    You may feel warm or hot. You may have a metal taste in your mouth and a slight upset stomach. You may also feel pressure near the kidneys and bladder. These effects will last about 1 to 3 minutes.    Please tell us if you have:   Sneezing    Itching   Hives    Swelling in the face   A hoarse voice   Breathing problems   Other new symptoms    Serious problems are rare.  They may include:   Irregular heartbeat    Seizures   Kidney failure             Tissue damage   Shock     Death    If you have any problems during the exam, we  will treat them right away.    When you get home    Call your hospital if you have any new symptoms in the next 2 days, like hives or swelling. (Phone numbers are at the bottom of this page.) Or call your family doctor.     If you have wheezing or trouble breathing, call 911.    Self-care  -Drink at least 4 extra glasses of water today.   This reduces the stress on your kidneys.  -Keep taking your regular medicines.    The contrast will pass out of your body in your  Urine(pee). This will happen in the next 24 hours. You  will not feel this. Your urine will not  change color.    If you have kidney problems or take metformin    Drink 4 to 8 large glasses of water for the next  2 days, if you are not on a fluid restriction.    ?If you take metformin (Glucophage or Glucovance) for diabetes, keep taking it.      ?Your kidney function tests are abnormal.  If you take Metformin, do not take it for 48 hours. Please go to your clinic for a blood test within 3  days after your exam before the restarting this medicine.     (Note to provider:please give patient prescription for lab tests.)    ?Special instructions: -    I have read and understand the above information.    Patient Sign Here:______________________________________Date:________Time:______    Staff Sign Here:________________________________________Date:_______Time:______      Radiology Departments:     ?[facility]: [phone] ?[place]: [phone]     ?[place]: [phone] ?[facility]:[phone]      ?[place]: [phone]  ?[place]: [phone]  ?[place]:[phone]    ?[facility]:[phone]  ?[facility]:[phone]

## 2020-03-23 NOTE — ED PROVIDER NOTES
History     Chief Complaint   Patient presents with     Swallowed Foreign Body     pt reports she ate a tuna fish sandwich on Friday evening and since feels like food or a fishbone is stuck in her esophagus. Takes pepcid.  Tried Tums and no differerence.      HPI  Veronica Ansari is a 50 year old female who presents today with complaints of a foreign body in her throat.  Patient states that approximately 2 days ago she was eating a tuna fish sandwich and then felt a pain in the left side of her chest.  Patient felt it may have been due to tunafish but symptoms did not resolve.  Patient has had a drink a lot of water, food and Tums as well as Motrin without complete relief of symptoms.  Patient states that symptoms seem worse when she eats food.  Patient denies any shortness of breath.  Patient is otherwise been in usual state of health.  Patient does not remember any feeling any choking sensation while eating a tuna fish    Allergies:  Allergies   Allergen Reactions     Augmentin Diarrhea     Doxycycline Unknown     Flagyl [Metronidazole] Other (See Comments)     Food      Mangoes cause rash     Sulfamethoxazole W/Trimethoprim Rash       Problem List:    Patient Active Problem List    Diagnosis Date Noted     S/P abdominoplasty 04/02/2018     Priority: Medium     Overview:   complicated by right calf DVT       Irritable bowel syndrome with diarrhea 11/14/2017     Priority: Medium     Excessive or frequent menstruation 04/19/2016     Priority: Medium     Iron deficiency 05/23/2014     Priority: Medium     Overview:   Without anemia       Cervicalgia 09/05/2013     Priority: Medium     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     Priority: Medium     Overview:   Heterozygous       H/O deep venous thrombosis 03/10/2007     Priority: Medium     Overview:   Post-operative LE DVT       Fibrocystic breast disease 08/25/2006     Priority: Medium     Sinusitis, chronic 07/21/2000     Priority: Medium     Overview:    Chronic  IMO Update 10/11       Deviated nasal septum 2000     Priority: Medium        Past Medical History:    Past Medical History:   Diagnosis Date     Cervicalgia      Deviated nasal septum      Embolism and thrombosis (H)      Fibrocystic breast disease      Migraine      Prothrombin mutation (H)      Sinusitis        Past Surgical History:    Past Surgical History:   Procedure Laterality Date     ABDOMINOPLASTY       C BREAST AUGMENTATION       C LAP CHOLECYSTOSOMY        SECTION      x2     COLONOSCOPY - HIM SCAN  2011    Colonoscopy 2011     COLONOSCOPY - HIM SCAN  2018    Procedure:  Colonoscopy diagnostic with polypectomy and biopsies;  Surgeon:  Danelle Swift MD;  Location:  [facility] OR     COLONOSCOPY - HIM SCAN  2007    Colonoscopy, flex, diagnostic     DILATE CERVIX, HYSTEROSCOPY, ABLATE ENDOMETRIUM, COMBINED N/A 2016    Procedure: COMBINED DILATE CERVIX, HYSTEROSCOPY, ABLATE ENDOMETRIUM;  Surgeon: Pacheco Crury MD;  Location: [facility] OR     ESOPHAGOGASTRODUODENOSCOPY      negative       Family History:    No family history on file.    Social History:  Marital Status:   [2]  Social History     Tobacco Use     Smoking status: Former Smoker     Years: 3.00     Types: Cigarettes     Last attempt to quit: 1988     Years since quittin.6     Smokeless tobacco: Never Used   Substance Use Topics     Alcohol use: Yes     Alcohol/week: 0.0 standard drinks     Comment: Occassional     Drug use: No        Medications:    Biotin 84833 MCG TBDP  famotidine (PEPCID) 20 MG tablet  vancomycin (VANCOCIN) 250 MG capsule  budesonide 0.5 MG/2ML IN neb solution  fluticasone (FLONASE) 50 MCG/ACT nasal spray          Review of Systems   Constitutional: Negative.  Negative for fever.   HENT: Negative.    Eyes: Negative.    Respiratory: Negative.  Negative for shortness of breath.    Cardiovascular: Positive for chest pain.   Gastrointestinal: Negative.  Negative for  nausea and vomiting.   Endocrine: Negative.    Genitourinary: Negative.    Musculoskeletal: Negative.    Allergic/Immunologic: Negative.    Neurological: Negative.  Negative for dizziness, weakness and numbness.   Hematological: Negative.    Psychiatric/Behavioral: Negative.  Negative for self-injury, sleep disturbance and suicidal ideas. The patient is not nervous/anxious.        Physical Exam   BP: (!) 156/101  Pulse: 77  Resp: 20  Weight: 74.8 kg (165 lb)  SpO2: 98 %      Physical Exam  Constitutional:       General: She is not in acute distress.     Appearance: Normal appearance. She is normal weight. She is not toxic-appearing.   HENT:      Mouth/Throat:      Pharynx: No oropharyngeal exudate or posterior oropharyngeal erythema.   Eyes:      General:         Right eye: No discharge.      Conjunctiva/sclera: Conjunctivae normal.   Neck:      Musculoskeletal: Normal range of motion and neck supple. No neck rigidity.   Cardiovascular:      Rate and Rhythm: Normal rate and regular rhythm.   Pulmonary:      Effort: Pulmonary effort is normal. No respiratory distress.      Breath sounds: Normal breath sounds. No wheezing or rales.   Abdominal:      General: Abdomen is flat. There is no distension.      Tenderness: There is no abdominal tenderness. There is no guarding.   Musculoskeletal: Normal range of motion.         General: No swelling or deformity.      Left lower leg: No edema.   Lymphadenopathy:      Cervical: No cervical adenopathy.   Skin:     General: Skin is warm.      Coloration: Skin is not jaundiced.      Findings: No bruising.   Neurological:      General: No focal deficit present.      Mental Status: She is alert.      Cranial Nerves: No cranial nerve deficit.      Motor: No weakness.   Psychiatric:         Mood and Affect: Mood normal.         Behavior: Behavior normal.         Thought Content: Thought content normal.         ED Course     ED Course as of Mar 23 0710   Mon Mar 23, 2020   0035 Patient  feeling better but sx persist. GI at [facility] consulted.       0054 Spoke with Dr. Dumont - Plan esophagram.         Procedures    EKG - NSR without ST changes        Results for orders placed or performed during the hospital encounter of 03/22/20 (from the past 24 hour(s))   CBC with platelets differential   Result Value Ref [place]    WBC 11.2 (H) 4.0 - 11.0 10e9/L    RBC Count 4.31 3.8 - 5.2 10e12/L    Hemoglobin 12.9 11.7 - 15.7 g/dL    Hematocrit 38.3 35.0 - 47.0 %    MCV 89 78 - 100 fl    MCH 29.9 26.5 - 33.0 pg    MCHC 33.7 31.5 - 36.5 g/dL    RDW 12.6 10.0 - 15.0 %    Platelet Count 303 150 - 450 10e9/L    Diff Method Automated Method     % Neutrophils 65.1 %    % Lymphocytes 24.1 %    % Monocytes 8.4 %    % Eosinophils 1.3 %    % Basophils 0.4 %    % Immature Granulocytes 0.7 %    Nucleated RBCs 0 0 /100    Absolute Neutrophil 7.3 1.6 - 8.3 10e9/L    Absolute Lymphocytes 2.7 0.8 - 5.3 10e9/L    Absolute Monocytes 0.9 0.0 - 1.3 10e9/L    Absolute Eosinophils 0.2 0.0 - 0.7 10e9/L    Absolute Basophils 0.0 0.0 - 0.2 10e9/L    Abs Immature Granulocytes 0.1 0 - 0.4 10e9/L    Absolute Nucleated RBC 0.0    D dimer quantitative   Result Value Ref [place]    D Dimer 0.4 0.0 - 0.50 ug/ml FEU   INR   Result Value Ref [place]    INR 1.24 (H) 0.86 - 1.14   Basic metabolic panel   Result Value Ref [place]    Sodium 139 133 - 144 mmol/L    Potassium 3.5 3.4 - 5.3 mmol/L    Chloride 107 94 - 109 mmol/L    Carbon Dioxide 27 20 - 32 mmol/L    Anion Gap 5 3 - 14 mmol/L    Glucose 128 (H) 70 - 99 mg/dL    Urea Nitrogen 13 7 - 30 mg/dL    Creatinine 0.76 0.52 - 1.04 mg/dL    GFR Estimate 89 >60 mL/min/[1.73_m2]    GFR Estimate If Black >90 >60 mL/min/[1.73_m2]    Calcium 8.5 8.5 - 10.1 mg/dL   Troponin I   Result Value Ref [place]    Troponin I ES <0.015 0.000 - 0.045 ug/L       Medications   glucagon injection 1 mg (has no administration in time range)       Assessments & Plan (with Medical Decision Making)     50-year-old  female who presents today with complaints of foreign bodies sensation in her esophagus.  Patient states that symptoms started 2 days ago when she was eating some tuna steak sandwich.  Patient states that symptoms are worsened when she drinks or eats solids.  In the emergency department patient had a work-up including EKG, chest CT and a chest x-ray.  Labs as above.  No acute findings noted.    Patient received glucagon as well as a GI cocktail with no significant changes.  Spoke to on-call surgeon, Dr. Cooper who states that patient should have an esophagram as a next step.  If esophagram is negative, decision will be made on whether to proceed with an endoscopy.  Esophagram ordered and to be scheduled by the radiology department today.  Patient understands to come back or call radiologist department if she has not heard from them today.    INR noted.  Patient aware of this abnormal value.  Has no bleeding whatsoever at this time.  No change in stool color.  States that her PCP is in the process of referring her to hematology oncology for work-up    At time of discharge patient tolerating p.o.'s, handling secretions and in no distress.  We will follow-up as outlined above.    Due to the nature of this electronic medical record, laboratory results, imaging results, diagnosis, other information and medications reported above may not represent information available to me at the the time of my care and disposition. Medications reported above may have not been ordered by me.     Portions of the record may have been created with voice recognition software. Occasional wrong-word or 'sound-a- like' substitution may have occurred due to the inherent limitations of voice recognition software. Though the chart has been reviewed, there may be inadvertent transcription errors. Read the chart carefully and recognize, using context, where substitutions have occurred.       New Prescriptions    No medications on file       Final  diagnoses:   Foreign body in digestive tract, initial encounter       3/22/2020   [facility]     Ed Campos MD  03/23/20 0719       Ed Campos MD  03/27/20 1215

## 2020-03-24 ENCOUNTER — MYC MEDICAL ADVICE (OUTPATIENT)
Dept: FAMILY MEDICINE | Facility: OTHER | Age: 50
End: 2020-03-24

## 2020-03-24 DIAGNOSIS — K21.00 GASTROESOPHAGEAL REFLUX DISEASE WITH ESOPHAGITIS: ICD-10-CM

## 2020-03-24 DIAGNOSIS — K22.10 ULCER OF ESOPHAGUS WITHOUT BLEEDING: Primary | ICD-10-CM

## 2020-03-24 RX ORDER — SUCRALFATE 1 G/1
1 TABLET ORAL 4 TIMES DAILY
Qty: 120 TABLET | Refills: 0 | Status: ON HOLD | OUTPATIENT
Start: 2020-03-24 | End: 2020-06-04

## 2020-03-24 RX ORDER — SUCRALFATE ORAL 1 G/10ML
1 SUSPENSION ORAL 4 TIMES DAILY
Qty: 120 ML | Refills: 3 | Status: ON HOLD | OUTPATIENT
Start: 2020-03-24 | End: 2020-06-04

## 2020-03-24 RX ORDER — OMEPRAZOLE 40 MG/1
40 CAPSULE, DELAYED RELEASE ORAL DAILY
Qty: 30 CAPSULE | Refills: 3 | Status: SHIPPED | OUTPATIENT
Start: 2020-03-24 | End: 2020-06-25

## 2020-03-24 NOTE — TELEPHONE ENCOUNTER
[facility] pharmacy, [facility] called and insurance will not cover Carafate suspension.     He ran it through insurance and the Carafate tablets can dissolve in water and still would be 1gram. This would be covered.      Will need new order for tabs.    Thank you    Brandy Bruce RN

## 2020-03-24 NOTE — TELEPHONE ENCOUNTER
I called pt and discussed findings and plan - spoke with Gen surgery and ppi/carafate/soft diet and EGD is the plan.

## 2020-03-26 ENCOUNTER — MYC MEDICAL ADVICE (OUTPATIENT)
Dept: FAMILY MEDICINE | Facility: OTHER | Age: 50
End: 2020-03-26

## 2020-03-26 NOTE — TELEPHONE ENCOUNTER
Again - focal symptoms not typical of medication reaction/rash.  Can take an OTC antihistamine - Zyrtec, Claritin, Benadryl, etc.  Can hold the carafate for now.  Continue the Prilosec.    Update if symptoms progress.

## 2020-03-29 ENCOUNTER — MYC MEDICAL ADVICE (OUTPATIENT)
Dept: FAMILY MEDICINE | Facility: OTHER | Age: 50
End: 2020-03-29

## 2020-03-29 DIAGNOSIS — A49.8 CLOSTRIDIOIDES DIFFICILE INFECTION: Primary | ICD-10-CM

## 2020-04-13 ENCOUNTER — TELEPHONE (OUTPATIENT)
Dept: FAMILY MEDICINE | Facility: OTHER | Age: 50
End: 2020-04-13

## 2020-04-13 DIAGNOSIS — A49.8 CLOSTRIDIOIDES DIFFICILE INFECTION: ICD-10-CM

## 2020-04-13 DIAGNOSIS — A49.8 CLOSTRIDIOIDES DIFFICILE INFECTION: Primary | ICD-10-CM

## 2020-04-13 LAB
C DIFF TOX B STL QL: POSITIVE
SPECIMEN SOURCE: ABNORMAL

## 2020-04-13 PROCEDURE — 87493 C DIFF AMPLIFIED PROBE: CPT | Performed by: FAMILY MEDICINE

## 2020-04-13 RX ORDER — FIDAXOMICIN 200 MG/1
200 TABLET, FILM COATED ORAL 2 TIMES DAILY
Qty: 20 TABLET | Refills: 0 | Status: SHIPPED | OUTPATIENT
Start: 2020-04-13 | End: 2020-04-23

## 2020-04-13 NOTE — TELEPHONE ENCOUNTER
DATE:  4/13/2020   TIME OF RECEIPT FROM LAB:  0843  LAB TEST:    LAB VALUE:  positive for c diff.   RESULTS GIVEN WITH READ-BACK TO (PROVIDER):  [place] Informed. routeing to dr carrion.   TIME LAB VALUE REPORTED TO PROVIDER:   7177      DEVIN Reed

## 2020-04-13 NOTE — TELEPHONE ENCOUNTER
Results reviewed.  Treated with Vancomycin 3/12/2020.  Did seem to resolve or at least improve.  Now, will treat with Dificid 200 mg BID x 10 days.  Script sent to [facility].  Please notify patient.

## 2020-04-27 DIAGNOSIS — A49.8 CLOSTRIDIOIDES DIFFICILE INFECTION: ICD-10-CM

## 2020-04-27 LAB
C DIFF TOX B STL QL: NEGATIVE
SPECIMEN SOURCE: NORMAL

## 2020-04-27 PROCEDURE — 87493 C DIFF AMPLIFIED PROBE: CPT | Performed by: FAMILY MEDICINE

## 2020-05-20 ENCOUNTER — OFFICE VISIT (OUTPATIENT)
Dept: SURGERY | Facility: OTHER | Age: 50
End: 2020-05-20
Attending: FAMILY MEDICINE
Payer: COMMERCIAL

## 2020-05-20 ENCOUNTER — PREP FOR PROCEDURE (OUTPATIENT)
Dept: SURGERY | Facility: OTHER | Age: 50
End: 2020-05-20

## 2020-05-20 VITALS
OXYGEN SATURATION: 98 % | TEMPERATURE: 98.1 F | HEART RATE: 66 BPM | DIASTOLIC BLOOD PRESSURE: 80 MMHG | SYSTOLIC BLOOD PRESSURE: 124 MMHG | RESPIRATION RATE: 16 BRPM

## 2020-05-20 DIAGNOSIS — K22.10 ESOPHAGEAL ULCER: Primary | ICD-10-CM

## 2020-05-20 DIAGNOSIS — K21.00 GASTROESOPHAGEAL REFLUX DISEASE WITH ESOPHAGITIS: ICD-10-CM

## 2020-05-20 DIAGNOSIS — K22.10 ULCER OF ESOPHAGUS WITHOUT BLEEDING: ICD-10-CM

## 2020-05-20 PROCEDURE — 99243 OFF/OP CNSLTJ NEW/EST LOW 30: CPT | Performed by: SURGERY

## 2020-05-20 RX ORDER — LORATADINE 10 MG/1
10 TABLET ORAL DAILY
COMMUNITY
End: 2020-10-06

## 2020-05-20 ASSESSMENT — PAIN SCALES - GENERAL: PAINLEVEL: NO PAIN (0)

## 2020-05-20 NOTE — PROGRESS NOTES
[facility] Surgery Consultation    CC:  Esophageal ulcer    HPI:  This 50 year old year old female is seen at the request of Dr. Perez for evaluation of esophageal ulcer.  The history is obtained from the patient, and reviewing the medical record.  She is good medical historian. She states that back in March she had developed left sided chest pain that came on after eating. She was eating a tuna fish sandwich and then felt like it had gotten stuck in her throat. She had taken over the counter medications prior to presenting to the ER with no relief of symptoms. While in the ER she had imaging done with a swallow study showing a shallow ulceration. She was then placed on PPI, carafate and a soft diet with follow up in surgery. Since then she says that she has had no worsening of symptoms and it has gotten a little better. She says that she will have some mild epigastric pain. She has had no heartburn, indigestion, reflux. She has had no regurgitation, dysphagia, cough or laryngitis.     Past Medical History:   Diagnosis Date     Cervicalgia      Deviated nasal septum      Embolism and thrombosis (H)     unspecified site     Fibrocystic breast disease      Migraine      Prothrombin mutation (H)      Sinusitis        Past Surgical History:   Procedure Laterality Date     ABDOMINOPLASTY       C BREAST AUGMENTATION       C LAP CHOLECYSTOSOMY        SECTION      x2     COLONOSCOPY - HIM SCAN  2011    Colonoscopy 2011     COLONOSCOPY - HIM SCAN  2018    Procedure:  Colonoscopy diagnostic with polypectomy and biopsies;  Surgeon:  Danelle Swift MD;  Location:  [facility] OR     COLONOSCOPY - HIM SCAN  2007    Colonoscopy, flex, diagnostic     DILATE CERVIX, HYSTEROSCOPY, ABLATE ENDOMETRIUM, COMBINED N/A 2016    Procedure: COMBINED DILATE CERVIX, HYSTEROSCOPY, ABLATE ENDOMETRIUM;  Surgeon: Pacheco Curry MD;  Location: [facility] OR     ESOPHAGOGASTRODUODENOSCOPY      negative       History  reviewed. No pertinent family history.    Social History     Tobacco Use     Smoking status: Former Smoker     Years: 3.00     Types: Cigarettes     Last attempt to quit: 1988     Years since quittin.7     Smokeless tobacco: Never Used   Substance Use Topics     Alcohol use: Yes     Alcohol/week: 0.0 standard drinks     Comment: Occassional     Drug use: No       Prior to Admission medications    Medication Sig Start Date End Date Taking? Authorizing Provider   Biotin 27005 MCG TBDP     Reported, Patient   budesonide 0.5 MG/2ML IN neb solution Make 240 ml freedom med sinus rinse, add 0.5 mg budesonide, rinse 1/2 bottle in each nostril twice daily 20   Pamela Silveira, NP   famotidine (PEPCID) 20 MG tablet Take 20 mg by mouth    Reported, Patient   fidaxomicin (DIFICID) 200 MG tablet Take 1 tablet (200 mg) by mouth 2 times daily for 10 days 20  Jenelle Ruiz MD   fluticasone (FLONASE) 50 MCG/ACT nasal spray INSTILL 2 SPRAYS IN EACH NOSTRIL D 19   Reported, Patient   omeprazole (PRILOSEC) 40 MG DR capsule Take 1 capsule (40 mg) by mouth daily 3/24/20   Jesus Perez MD   sucralfate (CARAFATE) 1 GM tablet Take 1 tablet (1 g) by mouth 4 times daily slurry by dissolving tablet  in water then slowly drinking. 3/24/20   Jesus Perez MD   sucralfate (CARAFATE) 1 GM/10ML suspension Take 10 mLs (1 g) by mouth 4 times daily 3/24/20   Jesus Perez MD   vancomycin (VANCOCIN) 250 MG capsule Take 1 capsule (250 mg) by mouth 4 times daily for 10 days 3/12/20 3/22/20  Jenelle Ruiz MD       Pt denied problems with bleeding or anesthesia  No mood altering drug use.       Allergies   Allergen Reactions     Augmentin Diarrhea     Doxycycline Unknown     Flagyl [Metronidazole] Other (See Comments)     Food      Mangoes cause rash     Sulfamethoxazole W/Trimethoprim Rash       REVIEW OF SYSTEMS:  Ten point review of systems negative except those mentioned in the HPI.     The  patient denies sleep apnea, latex allergies or MRSA    OBJECTIVE:    /80 (BP Location: Left arm, Patient Position: Sitting, Cuff Size: Adult Regular)   Pulse 66   Temp 98.1  F (36.7  C) (Tympanic)   Resp 16   SpO2 98%     GENERAL: Generally appears well, in no distress with appropriate affect.  Respiratory:  Lungs clear to ausculation bilaterally with good air excursion  Cardiovascular:  Regular Rate and Rhythm with no murmurs gallops or rubs, normal   Neurological: grossly intact  Psych:  Alert, oriented, affect appropriate with normal decision making ability.      IMPRESSION:  49 yo female with esophageal ulceration    PLAN:  At this time I recommend that she undergo an EGD for evaluation.  The indications, risks, benefits and technical aspects of esophagogastroduodenoscopy were reviewed with her questions asked and answered.  Preoperative preparation, npo after midnight preceding the date was discussed and a request made to hold aspirin containing agents one week prior to ameliorate antiplatelet effect.  Questions asked and answered - will proceed based on scheduling availability.        Thank you for allowing me to participate in the care of your patient.       Son Cooper MD     5/20/2020  3:29 PM    cc:  Jesus Perez

## 2020-05-20 NOTE — NURSING NOTE
"Chief Complaint   Patient presents with     Consult     Ulcer of esophagus without bleeding / Gastroesophageal reflux disease with esophagitis-  referral Dr. Perez       Initial /80 (BP Location: Left arm, Patient Position: Sitting, Cuff Size: Adult Regular)   Pulse 66   Temp 98.1  F (36.7  C) (Tympanic)   Resp 16   SpO2 98%  Estimated body mass index is 28.32 kg/m  as calculated from the following:    Height as of 3/12/20: 1.626 m (5' 4\").    Weight as of 3/22/20: 74.8 kg (165 lb).  Medication Reconciliation: complete  Rosanna Marin  "

## 2020-05-20 NOTE — PATIENT INSTRUCTIONS
"Thank you for allowing our surgical team to participate in your care. Please review the following instructions to prepare for your upcoming Upper Endoscopy. You may call any of the numbers listed below with questions you may have.  [facility] Health Unit Coordinator: [phone]  Clinic Surgery Nurse (Hyun): [phone]  Surgery Education Nurse: [phone]    Date of Procedure: Thursday June 4, 2020 with Dr. Cooper  Admit time: Surgery  will call you the day before your procedure by 5pm with your admit time. If your surgery is on Monday, please expect a call on Friday. If we were unable to reach you by 5PM, you may call  [phone] for your arrival time.    You not need a preoperative appointment your primary care provider within 30 days prior to surgery.      Please call the Surgery Education Nurses 1-2 weeks prior to your surgery date at  [phone] for further instructions. Have your medication/allergy lists ready.    Do not take Aspirin (325mg), other NSAIDs (Ibuprofen, Motrin, Aleve, Celebrex, Naproxen, etc.) vitamins/supplements 7 days before your surgery.   If you are on blood thinners or insulin, please call your primary care provider for instruction. If you are prescribed an 81 mg Aspirin, you may continue.    Please call the clinic surgery nurse or your regular doctor if you get sick within 1-2 weeks of the procedure. (vomiting, diarrhea, fever, cough, cold or any other symptoms of illness)    Do not eat any solid foods or milk products after 10pm the night prior to surgery.   You may have clear liquids only up until 4 hours prior to surgery.   Please see the list of liquids you may have and may not have on page 2 of the separate \"Instructions for Your Upper Endoscopy\" packet.     You will need a responsible adult available to drive you home and stay with you for at least 4 hours after you leave the hospital. You will not be allowed to drive yourself. If you need to take a taxi or the bus you " must have a responsible person to ride with you (not the taxi/). Your procedure will be cancelled if you do not bring a responsible adult.    Questions or concerns can be directed to the clinic or surgery education nurse at any of the numbers listed above. If you have a scheduling or appointment question, please call the Health Unit Coordinator between 8am and 4pm Monday through Friday. After hours or on weekends, please call [phone] to postpone.

## 2020-05-21 PROBLEM — K22.10 ESOPHAGEAL ULCER: Status: ACTIVE | Noted: 2020-05-21

## 2020-05-28 ENCOUNTER — ANESTHESIA EVENT (OUTPATIENT)
Dept: SURGERY | Facility: HOSPITAL | Age: 50
End: 2020-05-28
Payer: COMMERCIAL

## 2020-06-01 ENCOUNTER — OFFICE VISIT (OUTPATIENT)
Dept: FAMILY MEDICINE | Facility: OTHER | Age: 50
End: 2020-06-01
Attending: NURSE PRACTITIONER
Payer: COMMERCIAL

## 2020-06-01 DIAGNOSIS — Z01.818 PREOPERATIVE EXAMINATION: Primary | ICD-10-CM

## 2020-06-01 PROCEDURE — 99207 ZZC NO CHARGE NURSE ONLY: CPT

## 2020-06-01 PROCEDURE — 99000 SPECIMEN HANDLING OFFICE-LAB: CPT | Performed by: NURSE PRACTITIONER

## 2020-06-01 PROCEDURE — 87635 SARS-COV-2 COVID-19 AMP PRB: CPT | Mod: 90 | Performed by: NURSE PRACTITIONER

## 2020-06-01 ASSESSMENT — LIFESTYLE VARIABLES: TOBACCO_USE: 1

## 2020-06-01 NOTE — ANESTHESIA PREPROCEDURE EVALUATION
Anesthesia Pre-Procedure Evaluation    Patient: Veronica Ansari   MRN: [id number] : 1970          Preoperative Diagnosis: Esophageal ulcer [K22.10]    Procedure(s):  Upper Endoscopy    Past Medical History:   Diagnosis Date     Cervicalgia      Deviated nasal septum      Embolism and thrombosis (H)     unspecified site     Fibrocystic breast disease      Migraine      Prothrombin mutation (H)      Sinusitis      Past Surgical History:   Procedure Laterality Date     ABDOMINOPLASTY       C BREAST AUGMENTATION       C LAP CHOLECYSTOSOMY        SECTION      x2     COLONOSCOPY - HIM SCAN  2011    Colonoscopy 2011     COLONOSCOPY - HIM SCAN  2018    Procedure:  Colonoscopy diagnostic with polypectomy and biopsies;  Surgeon:  Danelle Swift MD;  Location:  [facility] OR     COLONOSCOPY - HIM SCAN  2007    Colonoscopy, flex, diagnostic     DILATE CERVIX, HYSTEROSCOPY, ABLATE ENDOMETRIUM, COMBINED N/A 2016    Procedure: COMBINED DILATE CERVIX, HYSTEROSCOPY, ABLATE ENDOMETRIUM;  Surgeon: Pacheco Curry MD;  Location: [facility] OR     ESOPHAGOGASTRODUODENOSCOPY      negative       Anesthesia Evaluation     . Pt has had prior anesthetic. Type: General and MAC    No history of anesthetic complications          ROS/MED HX    ENT/Pulmonary:     (+)other ENT- sinusitis, deviated nasal septum, tobacco use, Past use , . .    Neurologic:  - neg neurologic ROS     Cardiovascular:  - neg cardiovascular ROS   (+) ----. : . . . :. . Previous cardiac testing date:results:date: results:ECG reviewed date:3/22/2020 results:NSR date: results:          METS/Exercise Tolerance:  >4 METS   Hematologic:     (+) History of blood clots (prothrombin mutation) pt is not anticoagulated, Anemia (iron deficiency), -      Musculoskeletal:   (+) arthritis,  other musculoskeletal- cervicalgia      GI/Hepatic:     (+) Other GI/Hepatic esophageal ulcer, s/p abdominoplasty      Renal/Genitourinary:  - ROS Renal section negative  "      Endo:  - neg endo ROS       Psychiatric:  - neg psychiatric ROS       Infectious Disease:  - neg infectious disease ROS       Malignancy:      - no malignancy   Other:    - neg other ROS                      Physical Exam  Normal systems: cardiovascular, pulmonary and dental    Airway   Mallampati: II  TM distance: >3 FB  Neck ROM: full    Dental     Cardiovascular   Rhythm and rate: regular and normal      Pulmonary    breath sounds clear to auscultation            Lab Results   Component Value Date    WBC 11.2 (H) 03/22/2020    HGB 12.9 03/22/2020    HCT 38.3 03/22/2020     03/22/2020     03/22/2020    POTASSIUM 3.5 03/22/2020    CHLORIDE 107 03/22/2020    CO2 27 03/22/2020    BUN 13 03/22/2020    CR 0.76 03/22/2020     (H) 03/22/2020    BAO 8.5 03/22/2020    PTT 28 03/12/2020    INR 1.24 (H) 03/22/2020    HCG Negative 05/11/2016       Preop Vitals  BP Readings from Last 3 Encounters:   05/20/20 124/80   03/23/20 151/91   03/12/20 118/87    Pulse Readings from Last 3 Encounters:   05/20/20 66   03/22/20 77   03/12/20 84      Resp Readings from Last 3 Encounters:   05/20/20 16   03/23/20 14   03/12/20 17    SpO2 Readings from Last 3 Encounters:   05/20/20 98%   03/23/20 96%   03/12/20 99%      Temp Readings from Last 1 Encounters:   05/20/20 98.1  F (36.7  C) (Tympanic)    Ht Readings from Last 1 Encounters:   03/12/20 1.626 m (5' 4\")      Wt Readings from Last 1 Encounters:   03/22/20 74.8 kg (165 lb)    Estimated body mass index is 28.32 kg/m  as calculated from the following:    Height as of 3/12/20: 1.626 m (5' 4\").    Weight as of 3/22/20: 74.8 kg (165 lb).       Anesthesia Plan      History & Physical Review  History and physical reviewed and following examination; no interval change.    ASA Status:  3 .    NPO Status:  > 8 hours    Plan for MAC with Intravenous and Propofol induction. Maintenance will be TIVA.  Reason for MAC:  Deep or markedly invasive procedure (G8)    Risks and " benefits of MAC anesthetic discussed including dental damage, aspiration, loss of airway, conversion to general anesthetic, CV complications, MI, stroke, death. Pt wishes to proceed.   Surgery consult note 5/21        Postoperative Care      Consents  Anesthetic plan, risks, benefits and alternatives discussed with:  Patient..                 MAHAD Gaitan CRNA

## 2020-06-02 LAB
SARS-COV-2 RNA SPEC QL NAA+PROBE: NOT DETECTED
SPECIMEN SOURCE: NORMAL

## 2020-06-04 ENCOUNTER — ANESTHESIA (OUTPATIENT)
Dept: SURGERY | Facility: HOSPITAL | Age: 50
End: 2020-06-04
Payer: COMMERCIAL

## 2020-06-04 ENCOUNTER — HOSPITAL ENCOUNTER (OUTPATIENT)
Facility: HOSPITAL | Age: 50
Discharge: HOME | End: 2020-06-04
Attending: SURGERY | Admitting: SURGERY
Payer: COMMERCIAL

## 2020-06-04 VITALS
OXYGEN SATURATION: 96 % | HEIGHT: 64 IN | HEART RATE: 71 BPM | DIASTOLIC BLOOD PRESSURE: 91 MMHG | WEIGHT: 167 LBS | RESPIRATION RATE: 16 BRPM | BODY MASS INDEX: 28.51 KG/M2 | SYSTOLIC BLOOD PRESSURE: 136 MMHG | TEMPERATURE: 98.6 F

## 2020-06-04 DIAGNOSIS — K22.10 ESOPHAGEAL ULCER: ICD-10-CM

## 2020-06-04 PROCEDURE — 25000125 ZZHC RX 250: Performed by: SURGERY

## 2020-06-04 PROCEDURE — 36000050 ZZH SURGERY LEVEL 2 1ST 30 MIN: Performed by: SURGERY

## 2020-06-04 PROCEDURE — 27210794 ZZH OR GENERAL SUPPLY STERILE: Performed by: SURGERY

## 2020-06-04 PROCEDURE — 25800030 ZZH RX IP 258 OP 636: Performed by: NURSE ANESTHETIST, CERTIFIED REGISTERED

## 2020-06-04 PROCEDURE — 71000027 ZZH RECOVERY PHASE 2 EACH 15 MINS: Performed by: SURGERY

## 2020-06-04 PROCEDURE — 43239 EGD BIOPSY SINGLE/MULTIPLE: CPT | Performed by: NURSE ANESTHETIST, CERTIFIED REGISTERED

## 2020-06-04 PROCEDURE — 37000008 ZZH ANESTHESIA TECHNICAL FEE, 1ST 30 MIN: Performed by: SURGERY

## 2020-06-04 PROCEDURE — 88305 TISSUE EXAM BY PATHOLOGIST: CPT | Mod: TC | Performed by: SURGERY

## 2020-06-04 PROCEDURE — 25000128 H RX IP 250 OP 636: Performed by: NURSE ANESTHETIST, CERTIFIED REGISTERED

## 2020-06-04 PROCEDURE — 43239 EGD BIOPSY SINGLE/MULTIPLE: CPT | Performed by: SURGERY

## 2020-06-04 PROCEDURE — 40000305 ZZH STATISTIC PRE PROC ASSESS I: Performed by: SURGERY

## 2020-06-04 PROCEDURE — 25000125 ZZHC RX 250: Performed by: NURSE ANESTHETIST, CERTIFIED REGISTERED

## 2020-06-04 RX ORDER — LIDOCAINE 40 MG/G
CREAM TOPICAL
Status: DISCONTINUED | OUTPATIENT
Start: 2020-06-04 | End: 2020-06-04 | Stop reason: HOSPADM

## 2020-06-04 RX ORDER — SODIUM CHLORIDE, SODIUM LACTATE, POTASSIUM CHLORIDE, CALCIUM CHLORIDE 600; 310; 30; 20 MG/100ML; MG/100ML; MG/100ML; MG/100ML
INJECTION, SOLUTION INTRAVENOUS CONTINUOUS
Status: DISCONTINUED | OUTPATIENT
Start: 2020-06-04 | End: 2020-06-04 | Stop reason: HOSPADM

## 2020-06-04 RX ORDER — NALOXONE HYDROCHLORIDE 0.4 MG/ML
.1-.4 INJECTION, SOLUTION INTRAMUSCULAR; INTRAVENOUS; SUBCUTANEOUS
Status: DISCONTINUED | OUTPATIENT
Start: 2020-06-04 | End: 2020-06-04 | Stop reason: HOSPADM

## 2020-06-04 RX ORDER — NALOXONE HYDROCHLORIDE 0.4 MG/ML
.1-.4 INJECTION, SOLUTION INTRAMUSCULAR; INTRAVENOUS; SUBCUTANEOUS
Status: CANCELLED | OUTPATIENT
Start: 2020-06-04 | End: 2020-06-05

## 2020-06-04 RX ORDER — PROPOFOL 10 MG/ML
INJECTION, EMULSION INTRAVENOUS PRN
Status: DISCONTINUED | OUTPATIENT
Start: 2020-06-04 | End: 2020-06-04

## 2020-06-04 RX ORDER — LIDOCAINE HYDROCHLORIDE 20 MG/ML
INJECTION, SOLUTION INFILTRATION; PERINEURAL PRN
Status: DISCONTINUED | OUTPATIENT
Start: 2020-06-04 | End: 2020-06-04

## 2020-06-04 RX ORDER — ONDANSETRON 4 MG/1
4 TABLET, ORALLY DISINTEGRATING ORAL EVERY 30 MIN PRN
Status: DISCONTINUED | OUTPATIENT
Start: 2020-06-04 | End: 2020-06-04 | Stop reason: HOSPADM

## 2020-06-04 RX ORDER — ONDANSETRON 2 MG/ML
4 INJECTION INTRAMUSCULAR; INTRAVENOUS EVERY 30 MIN PRN
Status: DISCONTINUED | OUTPATIENT
Start: 2020-06-04 | End: 2020-06-04 | Stop reason: HOSPADM

## 2020-06-04 RX ORDER — FLUMAZENIL 0.1 MG/ML
0.2 INJECTION, SOLUTION INTRAVENOUS
Status: CANCELLED | OUTPATIENT
Start: 2020-06-04 | End: 2020-06-04

## 2020-06-04 RX ADMIN — SODIUM CHLORIDE, POTASSIUM CHLORIDE, SODIUM LACTATE AND CALCIUM CHLORIDE: 600; 310; 30; 20 INJECTION, SOLUTION INTRAVENOUS at 07:33

## 2020-06-04 RX ADMIN — PROPOFOL 30 MG: 10 INJECTION, EMULSION INTRAVENOUS at 07:58

## 2020-06-04 RX ADMIN — PROPOFOL 30 MG: 10 INJECTION, EMULSION INTRAVENOUS at 07:56

## 2020-06-04 RX ADMIN — PROPOFOL 50 MG: 10 INJECTION, EMULSION INTRAVENOUS at 07:55

## 2020-06-04 RX ADMIN — PROPOFOL 80 MG: 10 INJECTION, EMULSION INTRAVENOUS at 07:54

## 2020-06-04 RX ADMIN — LIDOCAINE HYDROCHLORIDE 40 MG: 20 INJECTION, SOLUTION INFILTRATION; PERINEURAL at 07:54

## 2020-06-04 ASSESSMENT — MIFFLIN-ST. JEOR: SCORE: 1352.51

## 2020-06-04 NOTE — OP NOTE
Veronica Ansari MRN# [id number]   YOB: 1970 Age: 50 year old      Date of Admission:  6/4/2020    Primary care provider: Jenelle Ruiz    PREOPERATIVE DIAGNOSIS:  Esophageal Ulcer      POSTOPERATIVE DIAGNOSES:    Normal esophageal, gastric and duodenal mucosa      PROCEDURE:  Esophagogastroduodenoscopy with cold forceps biopsies.       HISTORY OF PRESENT ILLNESS:  This 50 year old female developed epigastric discomfort and evidence of an ulcer on a swallow study. She was then recommended to undergo an upper endoscopy.     OPERATIVE FINDINGS:  The gastroesophageal junction was noted 35 cm from the incisors.  The Z line was regular without changes suggesting reflux.  There was no evidence of ulceration or stricture.  The stomach and duodenal mucosa was normal.    Specimen(s) submitted to pathology:  GE junction    PROCEDURE:  With the patient in the supine position on the transport cart, IV sedation was administered by the nurse anesthetist.  Her correct identity and planned procedure were confirmed during a requisite timeout pause.  A bite block was placed and the fiberoptic endoscope was introduced and negotiated through the cricopharyngeus without difficulty.  The length of the esophagus was examined without findings.  The gastroesophageal junction was noted 35 cm from the incisors; the Z line was regular without ulceration, bleeding source or stricture.  There was no  evidence of Beltran's change.  The stomach was entered and the pylorus was traversed easily.  The gastric mucosa was normal; there was no evidence of gastric polyp, ulcer or bleeding source.  The duodenum was similarly without finding.  The endoscope was returned to the body of the stomach and retroflex confirmed the absence of abnormality in the cardia.      The endoscope was returned to the GE junction.  Circumferential biopsies were obtained; hemostasis was satisfactory.  A second circumferential examination of the esophagus was  performed on slow withdrawal of the endoscope without additional finding.  The procedure was satisfactorially tolerated; there was no appreciable blood loss and the patient was returned to Day Surgery in good condition.      Son Cooper MD  6/4/2020  8:05 AM

## 2020-06-04 NOTE — ANESTHESIA CARE TRANSFER NOTE
Patient: Veronica Ansari    Procedure(s):  Upper Endoscopy with biopsy    Diagnosis: Esophageal ulcer [K22.10]  Diagnosis Additional Information: No value filed.    Anesthesia Type:   MAC     Note:  Airway :Nasal Cannula  Patient transferred to:Phase II  Handoff Report: Identifed the Patient, Identified the Reponsible Provider, Reviewed the pertinent medical history, Discussed the surgical course, Reviewed Intra-OP anesthesia mangement and issues during anesthesia, Set expectations for post-procedure period and Allowed opportunity for questions and acknowledgement of understanding      Vitals: (Last set prior to Anesthesia Care Transfer)    CRNA VITALS  6/4/2020 0734 - 6/4/2020 0804      6/4/2020             Resp Rate (set):  8                Electronically Signed By: MAHAD Gaitan CRNA  June 4, 2020  8:04 AM   frequent falls

## 2020-06-04 NOTE — ANESTHESIA POSTPROCEDURE EVALUATION
Patient: Veronica Ansari    Procedure(s):  Upper Endoscopy with biopsy    Diagnosis:Esophageal ulcer [K22.10]  Diagnosis Additional Information: No value filed.    Anesthesia Type:  MAC    Note:  Anesthesia Post Evaluation    Patient location during evaluation: Phase 2  Patient participation: Able to fully participate in evaluation  Level of consciousness: awake and alert  Pain management: adequate  Airway patency: patent  Cardiovascular status: acceptable  Respiratory status: acceptable  Hydration status: stable  PONV: none     Anesthetic complications: None          Last vitals:  Vitals:    06/04/20 0825 06/04/20 0830 06/04/20 0835   BP: 129/88 131/86 136/91   Pulse: 67 70 71   Resp:      Temp:      SpO2: 96% 97% 96%         Electronically Signed By: MAHAD Gaitan CRNA  June 4, 2020  9:07 AM

## 2020-06-04 NOTE — SIGNIFICANT EVENT
Patient and responsible adult given discharge instructions with no questions regarding instructions. Jose David score 18. Pain level 0/10.  Discharged from unit via walkjnig . Patient discharged to home .

## 2020-06-04 NOTE — DISCHARGE INSTRUCTIONS
UPPER ENDOSCOPY    AFTER THE PROCEDURE    You will return to Same Day Surgery to rest for about an hour before you go home.    The doctor will talk with you and your family.    A family member/friend may visit with you.    You may burp up any air remaining in your stomach.    You may feel dizzy or light-headed from the medicine.    Your nurse will go over the discharge instructions with you and your caregiver and answer any of your questions.      You will be contacted the next day to check on how you are doing.    If biopsies were taken, you will be contacted with the results usually within 3 days.  BACK AT HOME    Rest for an hour or two after you get home.    When your throat is no longer numb and you have a gag reflex, take a few sips of cool water.  If you can swallow comfortably, you may start eating again.    You may have a mild sore throat for the rest of the day.    You will be contacted with results by the surgeons office within a week. If not contacted in one week, call the surgeons office.  WHAT TO WATCH FOR:  Problems rarely occur after the exam, but it is important to be aware of the early signs of a complication.  Call your doctor immediately if you have:    Difficulty swallowing or breathing    Unusual pain in your stomach or chest    Vomiting blood or dark material that looks like coffee grounds    Black or tarry stools    Temperature over 101.5 degrees    MORE QUESTIONS?  Please ask your doctor or nurse before the exam begins  or call your doctor at the clinic.    IF YOU MUST CANCEL YOUR PROCEDURE THE EVENING/NIGHT BEFORE, PLEASE CALL HOSPITAL ADMITTING -[phone] OR TOLL FREE [phone], [phone].    Phone Numbers:  [facility] - [phone] [phone]  [facility] - [phone]  Surgery Patient Education - [phone] or toll free [phone]    Post-Anesthesia Patient Instructions    IMMEDIATELY FOLLOWING SURGERY:  Do not drive or operate machinery for the first twenty  four hours after surgery.  Do not make any important decisions for twenty four hours after surgery or while taking narcotic pain medications or sedatives.  If you develop intractable nausea and vomiting or a severe headache please notify your doctor immediately.    FOLLOW-UP:  Please make an appointment with your surgeon as instructed. You do not need to follow up with anesthesia unless specifically instructed to do so.    WOUND CARE INSTRUCTIONS (if applicable):  Keep a dry clean dressing on the anesthesia/puncture wound site if there is drainage.  Once the wound has quit draining you may leave it open to air.  Generally you should leave the bandage intact for twenty four hours unless there is drainage.  If the epidural site drains for more than 36-48 hours please call the anesthesia department.    QUESTIONS?:  Please feel free to call your physician or the hospital  if you have any questions, and they will be happy to assist you.

## 2020-06-05 LAB — COPATH REPORT: NORMAL

## 2020-06-24 DIAGNOSIS — K22.10 ULCER OF ESOPHAGUS WITHOUT BLEEDING: ICD-10-CM

## 2020-06-24 DIAGNOSIS — K21.00 GASTROESOPHAGEAL REFLUX DISEASE WITH ESOPHAGITIS: ICD-10-CM

## 2020-06-25 RX ORDER — OMEPRAZOLE 40 MG/1
CAPSULE, DELAYED RELEASE ORAL
Qty: 30 CAPSULE | Refills: 3 | Status: SHIPPED | OUTPATIENT
Start: 2020-06-25 | End: 2020-12-04

## 2020-06-30 ENCOUNTER — MYC MEDICAL ADVICE (OUTPATIENT)
Dept: FAMILY MEDICINE | Facility: OTHER | Age: 50
End: 2020-06-30

## 2020-06-30 DIAGNOSIS — R19.7 DIARRHEA, UNSPECIFIED TYPE: ICD-10-CM

## 2020-06-30 DIAGNOSIS — Z86.19 HISTORY OF CLOSTRIDIOIDES DIFFICILE INFECTION: Primary | ICD-10-CM

## 2020-07-02 DIAGNOSIS — Z86.19 HISTORY OF CLOSTRIDIOIDES DIFFICILE INFECTION: ICD-10-CM

## 2020-07-02 DIAGNOSIS — A04.71 RECURRENT CLOSTRIDIOIDES DIFFICILE DIARRHEA: Primary | ICD-10-CM

## 2020-07-02 DIAGNOSIS — R19.7 DIARRHEA, UNSPECIFIED TYPE: ICD-10-CM

## 2020-07-02 LAB
C DIFF TOX B STL QL: POSITIVE
SPECIMEN SOURCE: ABNORMAL

## 2020-07-02 PROCEDURE — 87493 C DIFF AMPLIFIED PROBE: CPT | Performed by: FAMILY MEDICINE

## 2020-07-02 RX ORDER — FIDAXOMICIN 200 MG/1
200 TABLET, FILM COATED ORAL 2 TIMES DAILY
Qty: 20 TABLET | Refills: 0 | Status: SHIPPED | OUTPATIENT
Start: 2020-07-02 | End: 2020-07-12

## 2020-10-05 NOTE — PROGRESS NOTES
Otolaryngology Progress Note          Veronica Ansari is a 50 year old female    Chronic congestion and a deviated nasal septum.  Symptoms include bilateral chronic congestion and occasional rhinorrhea leading to sore throat  Main complaint is chronic bilateral congestion  She also notes over the years her nose has looked more crooked at the tip.      Currently she notes thick postnasal drainage but this is not a chronic problem      History of distant nasal trauma as a child without prior nasal surgery      She initially saw Carol on 2/19/2020 and a septal deviation was noted she was to continue Flonase and start budesonide irrigations but did not start budesonide irrigations    Allergy history:  No chronic sneezing,or family hx of allergies  She has taken Claritin and Flonase in the past without improvement    She does snore, no bakari apnea    Imaging  CT sinuses dated 3/3/2020 shows clear sinuses with a significant septal deviation to the left at the mid septum with a spur and contact bilateral cobblestoned inferior turbinates      RO 12 point negative aside from that mentioned in the history of present illness and GERD diarrhea eye pain and hair loss      Physical Exam  /80   Pulse 70   Temp 98.1  F (36.7  C) (Tympanic)   Wt 77.1 kg (170 lb)   LMP 04/11/2016 (LMP Unknown)   SpO2 98%   BMI 29.18 kg/m    General - The patient is well nourished and well developed, and appears to have good nutritional status.  Alert and oriented to person and place, interactive.  Head and Face - Normocephalic and atraumatic, with no gross asymmetry noted of the contour of the facial features.  The facial nerve is intact, with strong symmetric movements.  Neck-no palpable lymphadenopathy or thyroid mass.  Trachea is midline.  Eyes - Extraocular movements intact.   Ears- External auditory canals are patent, tympanic membranes are intact without effusion or worrisome retractions   Nose -asymmetry at the nasal tip at right ULC.    Nasal mucosa is pink and moist with no abnormal mucus.  The septum was deviated left, turbinates enlarged.  No polyps, masses, or purulence noted on examination.  Bilateral internal and external nasal valve collapse improved with modified Dickens maneuver  Mouth - Examination of the oral cavity shows pink, healthy, moist mucosa.  No lesions or ulceration noted.  The dentition are in good repair.  The tongue is mobile and midline.  Throat - The walls of the oropharynx were smooth, pink, moist, symmetric, and had no lesions or ulcerations.  The tonsillar pillars and soft palate were symmetric.  The uvula was midline on elevation.  Grade 2 tonsils with erythema, no exudates.  Suarez Tongue Position III with tongue crenulations.    To evaluate the nose and sinuses, I performed rigid nasal endoscopy.  I applied topical nasal lidocaine and neosynephrine.    I began with the LEFT side using a 0 degree rigid nasal endoscope, and then similarly examined the RIGHT side    Findings:  Inferior turbinates:  Enlarged  DNS left with spur and contact, 80% obstruction  Middle turbinate and middle meatus:  No purulence, no polyposis  Mucosa is healthy throughout,  no polyps nor polypoid degeneration  Sphenoethmoidal recess without purulence   Nasopharynx clear  Thick secretions noted in the posterior nasopharynx  The patient tolerated the procedure well      Impression/Plan  Veronica Ansari is a 50 year old female    ICD-10-CM    1. DNS (deviated nasal septum)  J34.2 PLASTIC SURGERY REFERRAL   2. Nasal valve collapse  J34.89 PLASTIC SURGERY REFERRAL   3. Nasal turbinate hypertrophy  J34.3 PLASTIC SURGERY REFERRAL   4. Acute nasopharyngitis  J00          Use Freedom Med Nasal Saline 2-3 Times Daily for congestion    Follow up with Dr. Aiken to consider Septorhinoplasty      Contact us if you wish to set up a Septoplasty, Turbinate Reduction, Nasal Valve Repair    Freedom Med Nasal Saline  Use high volume freedom med saline irrigation.  Use  warm distilled water and 2 packets of the salt solution that comes with the bottle, dissolve in bottle up to 240 ml garrett.  Irrigate each side of your nose leaning over the sink, using 1/3 to 1/2 the volume of the bottle in each nostril every irrigation.  Irrigate 2 times daily and as needed.      Risks and complications septoplasty, bilateral turbinate reduction and nasal valve repair were discussed include general anesthesia, bleeding, infection, septal perforation, anosmia, epiphora, CSF rhinorrhea, atrophic rhinitis, scar formation, numbness over the incision, need for additional surgery and no guarantee is made that the septum will be completely straight.  Understanding is expressed, all questions are answered and wishes are to proceed with surgical intervention.  I discussed the risks and complications of nasal valve repair, including but no limited to anesthesia, bleeding, infection, change in the appearance of the nose, foreign body sensation, implant or cartilage extrusion, septal perforation, anosmia, atrophic rhinitis, scar formation, numbness over the incision, need for additional surgery.  Understanding is expressed, all questions are answered and wishes are to proceed with surgical intervention.    Would use tract and lateral if she proceeds            Farrah Richardson D.O.  Otolaryngology/Head and Neck Surgery  Allergy

## 2020-10-06 ENCOUNTER — OFFICE VISIT (OUTPATIENT)
Dept: OTOLARYNGOLOGY | Facility: OTHER | Age: 50
End: 2020-10-06
Attending: OTOLARYNGOLOGY
Payer: COMMERCIAL

## 2020-10-06 VITALS
TEMPERATURE: 98.1 F | OXYGEN SATURATION: 98 % | BODY MASS INDEX: 29.18 KG/M2 | HEART RATE: 70 BPM | WEIGHT: 170 LBS | SYSTOLIC BLOOD PRESSURE: 130 MMHG | DIASTOLIC BLOOD PRESSURE: 80 MMHG

## 2020-10-06 DIAGNOSIS — J34.829 NASAL VALVE COLLAPSE: ICD-10-CM

## 2020-10-06 DIAGNOSIS — J34.3 NASAL TURBINATE HYPERTROPHY: ICD-10-CM

## 2020-10-06 DIAGNOSIS — J00 ACUTE NASOPHARYNGITIS: ICD-10-CM

## 2020-10-06 DIAGNOSIS — J34.2 DNS (DEVIATED NASAL SEPTUM): Primary | ICD-10-CM

## 2020-10-06 PROCEDURE — 99214 OFFICE O/P EST MOD 30 MIN: CPT | Mod: 25 | Performed by: OTOLARYNGOLOGY

## 2020-10-06 PROCEDURE — 31231 NASAL ENDOSCOPY DX: CPT | Performed by: OTOLARYNGOLOGY

## 2020-10-06 ASSESSMENT — PAIN SCALES - GENERAL: PAINLEVEL: MILD PAIN (3)

## 2020-10-06 NOTE — PATIENT INSTRUCTIONS
Thank you for allowing Dr. Richardson and our ENT team to participate in your care.  If your medications are too expensive, please give the nurse a call.  We can possibly change this medication.  If you have a scheduling or an appointment question please contact our Health Unit Coordinator at their direct line [phone].   ALL nursing questions or concerns can be directed to your ENT nurse at: [phone] - Mireya    Use Freedom Med Nasal Saline 2-3 Times Daily for congestion    Follow up with Dr. Aiken to consider Septorhinoplasty      Contact us if you wish to set up a Septoplasty, Turbinate Reduction, Nasal Valve Repair    Freedom Med Nasal Saline  Use high volume freedom med saline irrigation.  Use warm distilled water and 2 packets of the salt solution that comes with the bottle, dissolve in bottle up to 240 ml garrett.  Irrigate each side of your nose leaning over the sink, using 1/3 to 1/2 the volume of the bottle in each nostril every irrigation.  Irrigate 2 times daily and as needed.

## 2020-10-06 NOTE — NURSING NOTE
"Chief Complaint   Patient presents with     RECHECK     Follow Up Deviated Nasal Septum, Chronic Congestion       Initial /80   Pulse 70   Temp 98.1  F (36.7  C) (Tympanic)   Wt 77.1 kg (170 lb)   LMP 04/11/2016 (LMP Unknown)   SpO2 98%   BMI 29.18 kg/m   Estimated body mass index is 29.18 kg/m  as calculated from the following:    Height as of 6/4/20: 1.626 m (5' 4\").    Weight as of this encounter: 77.1 kg (170 lb).  Medication Reconciliation: complete  Tammy Kendall LPN  "

## 2020-10-06 NOTE — LETTER
10/6/2020         RE: Veronica Ansari  [address]  [place] [address]        Dear Colleague,    Thank you for referring your patient, Veronica Ansari, to the [facility] [place]. Please see a copy of my visit note below.    Otolaryngology Progress Note          Veronica Ansari is a 50 year old female    Chronic congestion and a deviated nasal septum.  Symptoms include bilateral chronic congestion and occasional rhinorrhea leading to sore throat  Main complaint is chronic bilateral congestion  She also notes over the years her nose has looked more crooked at the tip.      Currently she notes thick postnasal drainage but this is not a chronic problem      History of distant nasal trauma as a child without prior nasal surgery      She initially saw Carol on 2/19/2020 and a septal deviation was noted she was to continue Flonase and start budesonide irrigations but did not start budesonide irrigations    Allergy history:  No chronic sneezing,or family hx of allergies  She has taken Claritin and Flonase in the past without improvement    She does snore, no bakari apnea    Imaging  CT sinuses dated 3/3/2020 shows clear sinuses with a significant septal deviation to the left at the mid septum with a spur and contact bilateral cobblestoned inferior turbinates      RO 12 point negative aside from that mentioned in the history of present illness and GERD diarrhea eye pain and hair loss      Physical Exam  /80   Pulse 70   Temp 98.1  F (36.7  C) (Tympanic)   Wt 77.1 kg (170 lb)   LMP 04/11/2016 (LMP Unknown)   SpO2 98%   BMI 29.18 kg/m    General - The patient is well nourished and well developed, and appears to have good nutritional status.  Alert and oriented to person and place, interactive.  Head and Face - Normocephalic and atraumatic, with no gross asymmetry noted of the contour of the facial features.  The facial nerve is intact, with strong symmetric movements.  Neck-no palpable  lymphadenopathy or thyroid mass.  Trachea is midline.  Eyes - Extraocular movements intact.   Ears- External auditory canals are patent, tympanic membranes are intact without effusion or worrisome retractions   Nose -asymmetry at the nasal tip at right ULC.   Nasal mucosa is pink and moist with no abnormal mucus.  The septum was deviated left, turbinates enlarged.  No polyps, masses, or purulence noted on examination.  Bilateral internal and external nasal valve collapse improved with modified William maneuver  Mouth - Examination of the oral cavity shows pink, healthy, moist mucosa.  No lesions or ulceration noted.  The dentition are in good repair.  The tongue is mobile and midline.  Throat - The walls of the oropharynx were smooth, pink, moist, symmetric, and had no lesions or ulcerations.  The tonsillar pillars and soft palate were symmetric.  The uvula was midline on elevation.  Grade 2 tonsils with erythema, no exudates    To evaluate the nose and sinuses, I performed rigid nasal endoscopy.  I applied topical nasal lidocaine and neosynephrine.    I began with the LEFT side using a 0 degree rigid nasal endoscope, and then similarly examined the RIGHT side    Findings:  Inferior turbinates:  Enlarged  DNS left with spur and contact, 80% obstruction  Middle turbinate and middle meatus:  No purulence, no polyposis  Mucosa is healthy throughout,  no polyps nor polypoid degeneration  Sphenoethmoidal recess without purulence   Nasopharynx clear  Thick secretions noted in the posterior nasopharynx  The patient tolerated the procedure well      Impression/Plan  Veronica Ansari is a 50 year old female    ICD-10-CM    1. DNS (deviated nasal septum)  J34.2 PLASTIC SURGERY REFERRAL   2. Nasal valve collapse  J34.89 PLASTIC SURGERY REFERRAL   3. Nasal turbinate hypertrophy  J34.3 PLASTIC SURGERY REFERRAL   4. Acute nasopharyngitis  J00          Use Leif Med Nasal Saline 2-3 Times Daily for congestion    Follow up with   Nelli to consider Septorhinoplasty      Contact us if you wish to set up a Septoplasty, Turbinate Reduction, Nasal Valve Repair    Freedom Med Nasal Saline  Use high volume freedom med saline irrigation.  Use warm distilled water and 2 packets of the salt solution that comes with the bottle, dissolve in bottle up to 240 ml garrett.  Irrigate each side of your nose leaning over the sink, using 1/3 to 1/2 the volume of the bottle in each nostril every irrigation.  Irrigate 2 times daily and as needed.      Risks and complications septoplasty, bilateral turbinate reduction and nasal valve repair were discussed include general anesthesia, bleeding, infection, septal perforation, anosmia, epiphora, CSF rhinorrhea, atrophic rhinitis, scar formation, numbness over the incision, need for additional surgery and no guarantee is made that the septum will be completely straight.  Understanding is expressed, all questions are answered and wishes are to proceed with surgical intervention.  I discussed the risks and complications of nasal valve repair, including but no limited to anesthesia, bleeding, infection, change in the appearance of the nose, foreign body sensation, implant or cartilage extrusion, septal perforation, anosmia, atrophic rhinitis, scar formation, numbness over the incision, need for additional surgery.  Understanding is expressed, all questions are answered and wishes are to proceed with surgical intervention.    Would use tract and lateral if she proceeds            Farrah Richardson D.O.  Otolaryngology/Head and Neck Surgery  Allergy                Again, thank you for allowing me to participate in the care of your patient.        Sincerely,        Farrah Richardson MD

## 2020-10-12 DIAGNOSIS — Z12.31 VISIT FOR SCREENING MAMMOGRAM: ICD-10-CM

## 2020-10-12 PROCEDURE — 77063 BREAST TOMOSYNTHESIS BI: CPT | Mod: TC | Performed by: FAMILY MEDICINE

## 2020-10-12 PROCEDURE — 77067 SCR MAMMO BI INCL CAD: CPT | Mod: TC | Performed by: FAMILY MEDICINE

## 2020-11-07 ENCOUNTER — HEALTH MAINTENANCE LETTER (OUTPATIENT)
Age: 50
End: 2020-11-07

## 2020-11-11 DIAGNOSIS — J32.9 CHRONIC CONGESTION OF PARANASAL SINUS: ICD-10-CM

## 2020-11-11 RX ORDER — FLUTICASONE PROPIONATE 50 MCG
SPRAY, SUSPENSION (ML) NASAL
Qty: 16 G | Refills: 3 | Status: SHIPPED | OUTPATIENT
Start: 2020-11-11 | End: 2021-03-30

## 2020-11-11 NOTE — TELEPHONE ENCOUNTER
PATSYNASE      Last Written Prescription Date:  02/19/20  Last Fill Quantity: 18.2mL,   # refills: 3  Last Office Visit: 03/12/20

## 2020-11-15 ENCOUNTER — MYC MEDICAL ADVICE (OUTPATIENT)
Dept: FAMILY MEDICINE | Facility: OTHER | Age: 50
End: 2020-11-15

## 2020-12-04 DIAGNOSIS — K21.00 GASTROESOPHAGEAL REFLUX DISEASE WITH ESOPHAGITIS WITHOUT HEMORRHAGE: ICD-10-CM

## 2020-12-04 DIAGNOSIS — K22.10 ULCER OF ESOPHAGUS WITHOUT BLEEDING: ICD-10-CM

## 2020-12-04 RX ORDER — OMEPRAZOLE 40 MG/1
CAPSULE, DELAYED RELEASE ORAL
Qty: 90 CAPSULE | Refills: 1 | Status: SHIPPED | OUTPATIENT
Start: 2020-12-04 | End: 2021-06-01

## 2020-12-31 ENCOUNTER — MYC MEDICAL ADVICE (OUTPATIENT)
Dept: FAMILY MEDICINE | Facility: OTHER | Age: 50
End: 2020-12-31

## 2020-12-31 ENCOUNTER — OFFICE VISIT (OUTPATIENT)
Dept: FAMILY MEDICINE | Facility: OTHER | Age: 50
End: 2020-12-31
Attending: FAMILY MEDICINE
Payer: COMMERCIAL

## 2020-12-31 VITALS
WEIGHT: 172.2 LBS | BODY MASS INDEX: 29.56 KG/M2 | DIASTOLIC BLOOD PRESSURE: 80 MMHG | TEMPERATURE: 97 F | SYSTOLIC BLOOD PRESSURE: 132 MMHG | HEART RATE: 83 BPM | OXYGEN SATURATION: 96 %

## 2020-12-31 DIAGNOSIS — H10.31 ACUTE CONJUNCTIVITIS OF RIGHT EYE, UNSPECIFIED ACUTE CONJUNCTIVITIS TYPE: Primary | ICD-10-CM

## 2020-12-31 DIAGNOSIS — Z23 NEED FOR PROPHYLACTIC VACCINATION AND INOCULATION AGAINST INFLUENZA: ICD-10-CM

## 2020-12-31 PROCEDURE — 90682 RIV4 VACC RECOMBINANT DNA IM: CPT | Performed by: FAMILY MEDICINE

## 2020-12-31 PROCEDURE — 90471 IMMUNIZATION ADMIN: CPT | Performed by: FAMILY MEDICINE

## 2020-12-31 PROCEDURE — 99213 OFFICE O/P EST LOW 20 MIN: CPT | Mod: 25 | Performed by: FAMILY MEDICINE

## 2020-12-31 RX ORDER — POLYMYXIN B SULFATE AND TRIMETHOPRIM 1; 10000 MG/ML; [USP'U]/ML
1-2 SOLUTION OPHTHALMIC EVERY 4 HOURS
Qty: 1 BOTTLE | Refills: 0 | Status: SHIPPED | OUTPATIENT
Start: 2020-12-31 | End: 2021-01-07

## 2020-12-31 ASSESSMENT — PAIN SCALES - GENERAL: PAINLEVEL: NO PAIN (0)

## 2020-12-31 NOTE — PROGRESS NOTES
Subjective     Veronica Ansari is a 50 year old female who presents to clinic today for the following health issues:    HPI         Eye(s) Problem  Onset/Duration: 1 week  Description:   Location: right eye  Pain: no  Redness: YES  Accompanying Signs & Symptoms:  Discharge/mattering: YES  Swelling: no  Visual changes: no  Fever: no  Nasal Congestion: no  Bothered by bright lights: no  History:  Trauma: no  Foreign body exposure: no  Wearing contacts: no  Precipitating or alleviating factors: None  Therapies tried and outcome: None  Eye gets worse the more tired she gets; feels gritty; no lid irritation  Glasses only.  No contacts.  No exposures.    Review of Systems   Constitutional, HEENT, cardiovascular, pulmonary, gi and gu systems are negative, except as otherwise noted.      Objective    /80 (BP Location: Right arm, Patient Position: Chair, Cuff Size: Adult Regular)   Pulse 83   Temp 97  F (36.1  C) (Tympanic)   Wt 78.1 kg (172 lb 3.2 oz)   LMP 04/11/2016 (LMP Unknown)   SpO2 96%   BMI 29.56 kg/m    Body mass index is 29.56 kg/m .  Physical Exam   GENERAL: healthy, alert and no distress  EYES: PERRL, EOMI and conjunctivae and sclerae normal other than very mild injection right conjunctiva; no ciliary flush; no light sensitivity; no stye formation; normal lids/lashes  HENT: ear canals and TM's normal, nose and mouth without ulcers or lesions  NECK: no adenopathy, no asymmetry, masses, or scars and thyroid normal to palpation  RESP: lungs clear to auscultation - no rales, rhonchi or wheezes  CV: regular rate and rhythm, normal S1 S2, no S3 or S4, no murmur, click or rub, no peripheral edema and peripheral pulses strong  PSYCH: mentation appears normal, affect normal/bright          Assessment & Plan     Acute conjunctivitis of right eye, unspecified acute conjunctivitis type  Good hygiene, eye cares.  Eye drops as discussed.  - trimethoprim-polymyxin b (POLYTRIM) 00640-4.1 UNIT/ML-% ophthalmic  "solution; Place 1-2 drops into the right eye every 4 hours for 7 days    Need for prophylactic vaccination and inoculation against influenza  Given.       BMI:   Estimated body mass index is 29.56 kg/m  as calculated from the following:    Height as of 6/4/20: 1.626 m (5' 4\").    Weight as of this encounter: 78.1 kg (172 lb 3.2 oz).            See Patient Instructions    No follow-ups on file.    Jenelle Rivera MD  [facility] - [place]    "

## 2020-12-31 NOTE — NURSING NOTE
"Chief Complaint   Patient presents with     Eye Problem       Initial /80 (BP Location: Right arm, Patient Position: Chair, Cuff Size: Adult Regular)   Pulse 83   Temp 97  F (36.1  C) (Tympanic)   Wt 78.1 kg (172 lb 3.2 oz)   LMP 04/11/2016 (LMP Unknown)   SpO2 96%   BMI 29.56 kg/m   Estimated body mass index is 29.56 kg/m  as calculated from the following:    Height as of 6/4/20: 1.626 m (5' 4\").    Weight as of this encounter: 78.1 kg (172 lb 3.2 oz).  Medication Reconciliation: complete  Niles Bell LPN  "

## 2021-01-08 ENCOUNTER — TRANSFERRED RECORDS (OUTPATIENT)
Dept: HEALTH INFORMATION MANAGEMENT | Facility: CLINIC | Age: 51
End: 2021-01-08

## 2021-01-13 ENCOUNTER — TELEPHONE (OUTPATIENT)
Dept: FAMILY MEDICINE | Facility: OTHER | Age: 51
End: 2021-01-13

## 2021-01-13 ENCOUNTER — HOSPITAL ENCOUNTER (OUTPATIENT)
Dept: ULTRASOUND IMAGING | Facility: HOSPITAL | Age: 51
Discharge: HOME | End: 2021-01-13
Attending: FAMILY MEDICINE | Admitting: FAMILY MEDICINE
Payer: COMMERCIAL

## 2021-01-13 ENCOUNTER — OFFICE VISIT (OUTPATIENT)
Dept: FAMILY MEDICINE | Facility: OTHER | Age: 51
End: 2021-01-13
Attending: FAMILY MEDICINE
Payer: COMMERCIAL

## 2021-01-13 ENCOUNTER — MYC MEDICAL ADVICE (OUTPATIENT)
Dept: FAMILY MEDICINE | Facility: OTHER | Age: 51
End: 2021-01-13

## 2021-01-13 VITALS
TEMPERATURE: 98.1 F | WEIGHT: 172 LBS | OXYGEN SATURATION: 98 % | BODY MASS INDEX: 29.37 KG/M2 | HEIGHT: 64 IN | SYSTOLIC BLOOD PRESSURE: 130 MMHG | DIASTOLIC BLOOD PRESSURE: 82 MMHG | HEART RATE: 87 BPM

## 2021-01-13 DIAGNOSIS — M79.661 RIGHT CALF PAIN: Primary | ICD-10-CM

## 2021-01-13 DIAGNOSIS — M79.661 RIGHT CALF PAIN: ICD-10-CM

## 2021-01-13 PROCEDURE — 99212 OFFICE O/P EST SF 10 MIN: CPT | Performed by: FAMILY MEDICINE

## 2021-01-13 PROCEDURE — 93971 EXTREMITY STUDY: CPT | Mod: RT

## 2021-01-13 ASSESSMENT — PAIN SCALES - GENERAL: PAINLEVEL: MODERATE PAIN (4)

## 2021-01-13 ASSESSMENT — MIFFLIN-ST. JEOR: SCORE: 1375.19

## 2021-01-13 NOTE — NURSING NOTE
"Chief Complaint   Patient presents with     Pain       Initial /82 (BP Location: Left arm, Patient Position: Sitting, Cuff Size: Adult Regular)   Pulse 87   Temp 98.1  F (36.7  C) (Tympanic)   Ht 1.626 m (5' 4\")   Wt 78 kg (172 lb)   LMP 04/11/2016 (LMP Unknown)   SpO2 98%   BMI 29.52 kg/m   Estimated body mass index is 29.52 kg/m  as calculated from the following:    Height as of this encounter: 1.626 m (5' 4\").    Weight as of this encounter: 78 kg (172 lb).  Medication Reconciliation: complete  Bertha Gary LPN  "

## 2021-01-13 NOTE — PROGRESS NOTES
"  Assessment & Plan     Right calf pain  US negative for DVT.  Likely muscle strain vs symptomatic varicosity.  Suspect new activity of snow shoeing may have triggered symptoms.  Symptomatic cares - heat, ice, NSAIDs, stretching.  Follow up as needed.  - US Lower Extremity Venous Duplex Right; Future       BMI:   Estimated body mass index is 29.52 kg/m  as calculated from the following:    Height as of this encounter: 1.626 m (5' 4\").    Weight as of this encounter: 78 kg (172 lb).       Jenelle Rivera MD  [facility] - JOHANA Martin is a 50 year old who presents to clinic today for the following health issues     HPI       Concern - Calf Pain - right  Onset: about 2 weeks ago  Description: pain- increased over the past couple weeks  Intensity: moderate  Progression of Symptoms:  same and constant  Accompanying Signs & Symptoms: none  Previous history of similar problem: history of DVT 2007 noted in same leg a couple years ago per pt; post operative; does have prothrombin gene mutation; no chronic anticoagulation  Precipitating factors:        Worsened by: none  Alleviating factors:        Improved by: none  Therapies tried and outcome: None  Symptoms more of an ache, noticed more at night.  No swelling.  No change in activity.  Did snowshoeing in past couple weeks.         Review of Systems   Constitutional, HEENT, cardiovascular, pulmonary, gi and gu systems are negative, except as otherwise noted.      Objective    /82 (BP Location: Left arm, Patient Position: Sitting, Cuff Size: Adult Regular)   Pulse 87   Temp 98.1  F (36.7  C) (Tympanic)   Ht 1.626 m (5' 4\")   Wt 78 kg (172 lb)   LMP 04/11/2016 (LMP Unknown)   SpO2 98%   BMI 29.52 kg/m    Body mass index is 29.52 kg/m .  Physical Exam   GENERAL: healthy, alert and no distress  NECK: no adenopathy, no asymmetry, masses, or scars and thyroid normal to palpation  RESP: lungs clear to auscultation - no rales, rhonchi " or wheezes  CV: regular rate and rhythm, normal S1 S2, no S3 or S4, no murmur, click or rub, no peripheral edema and peripheral pulses strong  MS: normal muscle tone, normal range of motion, varicose veins noted - left mild, no edema, peripheral pulses normal, gait normal, no ataxia and negative Arie's sign; calves symmetric; mild generalized posterior calf pain  SKIN: no suspicious lesions or rashes  NEURO: Normal strength and tone, mentation intact and speech normal  PSYCH: mentation appears normal, affect normal/bright

## 2021-01-13 NOTE — TELEPHONE ENCOUNTER
10:25 AM    Reason for Call: OVERBOOK    Patient is having the following symptoms: calf pain for 2 weeks.    The patient is requesting an appointment for sooner than 01/27/21 with Dr Rivera.    Was an appointment offered for this call? Yes  If yes : Appointment type short              Date 01/27/2021    Preferred method for responding to this message: Telephone Call  What is your phone number ? [phone]    If we cannot reach you directly, may we leave a detailed response at the number you provided? Yes    Can this message wait until your PCP/provider returns, if unavailable today? Provider is in    Imelda Tinajero  10:25

## 2021-01-15 ENCOUNTER — HEALTH MAINTENANCE LETTER (OUTPATIENT)
Age: 51
End: 2021-01-15

## 2021-03-17 PROBLEM — K82.8 BILIARY DYSKINESIA: Status: ACTIVE | Noted: 2018-11-14

## 2021-03-17 NOTE — PATIENT INSTRUCTIONS
Consider Aspirin 81mg daily.  Vitamin D 2000 units daily advised.    Pap/hpv due 10/2022.  Mammogram due 10/2021.  Colonoscopy due 2023 (done 2018).    Preventive Health Recommendations  Female Ages 50 - 64    Yearly exam: See your health care provider every year in order to  o Review health changes.   o Discuss preventive care.    o Review your medicines if your doctor has prescribed any.      Get a Pap test every three years (unless you have an abnormal result and your provider advises testing more often).    If you get Pap tests with HPV test, you only need to test every 5 years, unless you have an abnormal result.     You do not need a Pap test if your uterus was removed (hysterectomy) and you have not had cancer.    You should be tested each year for STDs (sexually transmitted diseases) if you're at risk.     Have a mammogram every 1 to 2 years.    Have a colonoscopy at age 50, or have a yearly FIT test (stool test). These exams screen for colon cancer.      Have a cholesterol test every 5 years, or more often if advised.    Have a diabetes test (fasting glucose) every three years. If you are at risk for diabetes, you should have this test more often.     If you are at risk for osteoporosis (brittle bone disease), think about having a bone density scan (DEXA).    Shots: Get a flu shot each year. Get a tetanus shot every 10 years.    Nutrition:     Eat at least 5 servings of fruits and vegetables each day.    Eat whole-grain bread, whole-wheat pasta and brown rice instead of white grains and rice.    Get adequate Calcium and Vitamin D.     Lifestyle    Exercise at least 150 minutes a week (30 minutes a day, 5 days a week). This will help you control your weight and prevent disease.    Limit alcohol to one drink per day.    No smoking.     Wear sunscreen to prevent skin cancer.     See your dentist every six months for an exam and cleaning.    See your eye doctor every 1 to 2 years.

## 2021-03-17 NOTE — PROGRESS NOTES
SUBJECTIVE:   CC: Veronica Ansari is an 51 year old woman who presents for preventive health visit.       Patient has been advised of split billing requirements and indicates understanding: Yes  Healthy Habits:    Do you get at least three servings of calcium containing foods daily (dairy, green leafy vegetables, etc.)? NO    Amount of exercise or daily activities, outside of work: none    Problems taking medications regularly No    Medication side effects: No    Have you had an eye exam in the past two years? yes    Do you see a dentist twice per year? yes    Do you have sleep apnea, excessive snoring or daytime drowsiness?no    Mammogram 10/2020    Colonoscopy , due     Last pap  - ?Hpv done?  - if not due 10/2022    Had pfrizer covid vaccine #1, #2 due 2021    GERD - EGD  with biopsy; reflux only;  on Prilosec daily; some breakthrough symptoms if misses doses    Flonase for post nasal drip year round; prior ENT consult Dr Richardson    Last lipids  normal; fasting glucose 103    Biotin    Vit D advised    Prior iron deficiency - unsure cause; had pica - ice cravings; wants level checked    Has tried elimination diets; wonders about allergy testing; did cut out soda - did improve loose stool    Stroke risk?  Thinks may have had one many years ago; couldn't say certain words; aunt with CVA whom ; history of clotting disorder        Today's PHQ-2 Score:   PHQ-2 (  [employer]) 2021 3/12/2020   Q1: Little interest or pleasure in doing things 0 0   Q2: Feeling down, depressed or hopeless 0 0   PHQ-2 Score 0 0       Abuse: Current or Past(Physical, Sexual or Emotional)- No  Do you feel safe in your environment? Yes    Have you ever done Advance Care Planning? (For example, a Health Directive, POLST, or a discussion with a medical provider or your loved ones about your wishes):     Social History     Tobacco Use     Smoking status: Former Smoker     Years: 3.00     Types: Cigarettes      Quit date:      Years since quittin.2     Smokeless tobacco: Never Used   Substance Use Topics     Alcohol use: Yes     Alcohol/week: 0.0 standard drinks     Comment: Occassional     If you drink alcohol do you typically have >3 drinks per day or >7 drinks per week? No                     Reviewed orders with patient.  Reviewed health maintenance and updated orders accordingly - Yes  Current Outpatient Medications   Medication     fluticasone (FLONASE) 50 MCG/ACT nasal spray     omeprazole (PRILOSEC) 40 MG DR capsule     No current facility-administered medications for this visit.        Lab work is in process  Labs reviewed in [employer]    Breast CA Risk Screening:  No flowsheet data found.      Mammogram Screening: Recommended annual mammography  Pertinent mammograms are reviewed under the imaging tab.    Pertinent mammograms are reviewed under the imaging tab.  History of abnormal Pap smear: NO - age 30-65 PAP every 5 years with negative HPV co-testing recommended  Last 3 Pap and HPV Results:       Reviewed and updated as needed this visit by clinical staff  Tobacco  Allergies  Meds   Med Hx  Surg Hx  Fam Hx  Soc Hx        Reviewed and updated as needed this visit by Provider   Allergies  Meds             Past Medical History:   Diagnosis Date     Cervicalgia      Deviated nasal septum      Embolism and thrombosis (H)     unspecified site     Fibrocystic breast disease      Migraine      Prothrombin mutation (H)      Sinusitis       Past Surgical History:   Procedure Laterality Date     ABDOMINOPLASTY       C BREAST AUGMENTATION       C LAP CHOLECYSTOSOMY        SECTION      x2     COLONOSCOPY - HIM SCAN  2011    Colonoscopy 2011     COLONOSCOPY - HIM SCAN  2018    Procedure:  Colonoscopy diagnostic with polypectomy and biopsies;  Surgeon:  Danelle Swift MD;  Location:  [facility] OR     COLONOSCOPY - HIM SCAN  2007    Colonoscopy, flex, diagnostic     DILATE CERVIX,  HYSTEROSCOPY, ABLATE ENDOMETRIUM, COMBINED N/A 5/11/2016    Procedure: COMBINED DILATE CERVIX, HYSTEROSCOPY, ABLATE ENDOMETRIUM;  Surgeon: Pacheco Curry MD;  Location: [facility] OR     ESOPHAGOGASTRODUODENOSCOPY      negative     ESOPHAGOSCOPY, GASTROSCOPY, DUODENOSCOPY (EGD), COMBINED N/A 6/4/2020    Procedure: Upper Endoscopy with biopsy;  Surgeon: Son Cooper MD;  Location: [facility] OR       ROS:  CONSTITUTIONAL: NEGATIVE for fever, chills, change in weight  INTEGUMENTARY/SKIN: NEGATIVE for worrisome rashes, moles or lesions  EYES: NEGATIVE for vision changes or irritation  ENT: NEGATIVE for ear, mouth and throat problems  RESP: NEGATIVE for significant cough or SOB  BREAST: NEGATIVE for masses, tenderness or discharge  CV: NEGATIVE for chest pain, palpitations or peripheral edema  GI: NEGATIVE for nausea, abdominal pain, heartburn, or change in bowel habits  : NEGATIVE for unusual urinary or vaginal symptoms. No vaginal bleeding.  MUSCULOSKELETAL: NEGATIVE for significant arthralgias or myalgia  NEURO: NEGATIVE for weakness, dizziness or paresthesias  PSYCHIATRIC: NEGATIVE for changes in mood or affect     OBJECTIVE:   /78 (BP Location: Left arm, Patient Position: Sitting, Cuff Size: Adult Regular)   Pulse 75   Temp 96.9  F (36.1  C) (Tympanic)   Resp 20   Wt 76.7 kg (169 lb)   LMP 04/11/2016 (LMP Unknown)   SpO2 98%   BMI 29.01 kg/m    EXAM:  GENERAL APPEARANCE: healthy, alert and no distress  EYES: Eyes grossly normal to inspection, PERRL and conjunctivae and sclerae normal  HENT: ear canals and TM's normal, nose and mouth without ulcers or lesions, oropharynx clear and oral mucous membranes moist  NECK: no adenopathy, no asymmetry, masses, or scars and thyroid normal to palpation  RESP: lungs clear to auscultation - no rales, rhonchi or wheezes  BREAST: normal without masses, tenderness or nipple discharge and no palpable axillary masses or adenopathy  CV: regular rate and rhythm, normal S1 S2, no  S3 or S4, no murmur, click or rub, no peripheral edema and peripheral pulses strong  ABDOMEN: soft, nontender, no hepatosplenomegaly, no masses and bowel sounds normal   (female): normal female external genitalia, normal urethral meatus, vaginal mucosal atrophy noted and bimanual exam without masses or tenderness  MS: no musculoskeletal defects are noted and gait is age appropriate without ataxia  SKIN: no suspicious lesions or rashes  NEURO: Normal strength and tone, sensory exam grossly normal, mentation intact and speech normal  PSYCH: mentation appears normal and affect normal/bright    Diagnostic Test Results:  Labs reviewed in Epic  Labs reviewed in Care Everywhere  Labs pending    ASSESSMENT/PLAN:       ICD-10-CM    1. Routine general medical examination at a health care facility  Z00.00 CBC with platelets and differential     Comprehensive metabolic panel (BMP + Alb, Alk Phos, ALT, AST, Total. Bili, TP)   2. Foot cramps  R25.2 Comprehensive metabolic panel (BMP + Alb, Alk Phos, ALT, AST, Total. Bili, TP)     Magnesium   3. Prediabetes  R73.03 Comprehensive metabolic panel (BMP + Alb, Alk Phos, ALT, AST, Total. Bili, TP)     Hemoglobin A1c   4. History of iron deficiency  Z86.39 Iron and iron binding capacity     CBC with platelets and differential     Aspirin 81 mg discussed.  Vitamin D supplement discussed.    Patient has been advised of split billing requirements and indicates understanding: Yes  COUNSELING:   Reviewed preventive health counseling, as reflected in patient instructions       Regular exercise       Healthy diet/nutrition       Vision screening       Hearing screening       Immunizations       Aspirin prophylaxis       Alcohol Use       Osteoporosis prevention/bone health       Colon cancer screening       Consider Hep C screening for all patients one time for ages 18-79 years       HIV screeninx in teen years, 1x in adult years, and at intervals if high risk    Estimated body mass  "index is 29.01 kg/m  as calculated from the following:    Height as of 1/13/21: 1.626 m (5' 4\").    Weight as of this encounter: 76.7 kg (169 lb).    Weight management plan: Discussed healthy diet and exercise guidelines    She reports that she quit smoking about 34 years ago. Her smoking use included cigarettes. She quit after 3.00 years of use. She has never used smokeless tobacco.      Counseling Resources:  ATP IV Guidelines  Pooled Cohorts Equation Calculator  Breast Cancer Risk Calculator  BRCA-Related Cancer Risk Assessment: FHS-7 Tool  FRAX Risk Assessment  ICSI Preventive Guidelines  Dietary Guidelines for Americans, 2010  USDA's MyPlate  ASA Prophylaxis  Lung CA Screening    Jenelle Rivera MD  [facility] - [place]  "

## 2021-03-18 ENCOUNTER — IMMUNIZATION (OUTPATIENT)
Dept: FAMILY MEDICINE | Facility: OTHER | Age: 51
End: 2021-03-18
Attending: FAMILY MEDICINE
Payer: COMMERCIAL

## 2021-03-18 PROCEDURE — 0001A PR COVID VAC PFIZER DIL RECON 30 MCG/0.3 ML IM: CPT

## 2021-03-18 PROCEDURE — 91300 PR COVID VAC PFIZER DIL RECON 30 MCG/0.3 ML IM: CPT

## 2021-03-24 ENCOUNTER — OFFICE VISIT (OUTPATIENT)
Dept: FAMILY MEDICINE | Facility: OTHER | Age: 51
End: 2021-03-24
Attending: FAMILY MEDICINE
Payer: COMMERCIAL

## 2021-03-24 VITALS
DIASTOLIC BLOOD PRESSURE: 78 MMHG | WEIGHT: 169 LBS | RESPIRATION RATE: 20 BRPM | HEART RATE: 75 BPM | TEMPERATURE: 96.9 F | OXYGEN SATURATION: 98 % | SYSTOLIC BLOOD PRESSURE: 126 MMHG | BODY MASS INDEX: 29.01 KG/M2

## 2021-03-24 DIAGNOSIS — Z00.00 ROUTINE GENERAL MEDICAL EXAMINATION AT A HEALTH CARE FACILITY: Primary | ICD-10-CM

## 2021-03-24 DIAGNOSIS — R73.03 PREDIABETES: ICD-10-CM

## 2021-03-24 DIAGNOSIS — R25.2 FOOT CRAMPS: ICD-10-CM

## 2021-03-24 DIAGNOSIS — Z86.39 HISTORY OF IRON DEFICIENCY: ICD-10-CM

## 2021-03-24 LAB
ALBUMIN SERPL-MCNC: 3.7 G/DL (ref 3.4–5)
ALP SERPL-CCNC: 60 U/L (ref 40–150)
ALT SERPL W P-5'-P-CCNC: 27 U/L (ref 0–50)
ANION GAP SERPL CALCULATED.3IONS-SCNC: 3 MMOL/L (ref 3–14)
AST SERPL W P-5'-P-CCNC: 12 U/L (ref 0–45)
BASOPHILS # BLD AUTO: 0.1 10E9/L (ref 0–0.2)
BASOPHILS NFR BLD AUTO: 0.5 %
BILIRUB SERPL-MCNC: 0.3 MG/DL (ref 0.2–1.3)
BUN SERPL-MCNC: 9 MG/DL (ref 7–30)
CALCIUM SERPL-MCNC: 8.8 MG/DL (ref 8.5–10.1)
CHLORIDE SERPL-SCNC: 104 MMOL/L (ref 94–109)
CO2 SERPL-SCNC: 30 MMOL/L (ref 20–32)
CREAT SERPL-MCNC: 0.71 MG/DL (ref 0.52–1.04)
DIFFERENTIAL METHOD BLD: ABNORMAL
EOSINOPHIL # BLD AUTO: 0.1 10E9/L (ref 0–0.7)
EOSINOPHIL NFR BLD AUTO: 0.7 %
ERYTHROCYTE [DISTWIDTH] IN BLOOD BY AUTOMATED COUNT: 12.6 % (ref 10–15)
EST. AVERAGE GLUCOSE BLD GHB EST-MCNC: 105 MG/DL
GFR SERPL CREATININE-BSD FRML MDRD: >90 ML/MIN/{1.73_M2}
GLUCOSE SERPL-MCNC: 82 MG/DL (ref 70–99)
HBA1C MFR BLD: 5.3 % (ref 0–5.6)
HCT VFR BLD AUTO: 43.4 % (ref 35–47)
HGB BLD-MCNC: 14.5 G/DL (ref 11.7–15.7)
IMM GRANULOCYTES # BLD: 0.2 10E9/L (ref 0–0.4)
IMM GRANULOCYTES NFR BLD: 1.3 %
IRON SATN MFR SERPL: 19 % (ref 15–46)
IRON SERPL-MCNC: 59 UG/DL (ref 35–180)
LYMPHOCYTES # BLD AUTO: 2.5 10E9/L (ref 0.8–5.3)
LYMPHOCYTES NFR BLD AUTO: 20.3 %
MAGNESIUM SERPL-MCNC: 2.1 MG/DL (ref 1.6–2.3)
MCH RBC QN AUTO: 30 PG (ref 26.5–33)
MCHC RBC AUTO-ENTMCNC: 33.4 G/DL (ref 31.5–36.5)
MCV RBC AUTO: 90 FL (ref 78–100)
MONOCYTES # BLD AUTO: 1.1 10E9/L (ref 0–1.3)
MONOCYTES NFR BLD AUTO: 8.9 %
NEUTROPHILS # BLD AUTO: 8.2 10E9/L (ref 1.6–8.3)
NEUTROPHILS NFR BLD AUTO: 68.3 %
NRBC # BLD AUTO: 0 10*3/UL
NRBC BLD AUTO-RTO: 0 /100
PLATELET # BLD AUTO: 410 10E9/L (ref 150–450)
POTASSIUM SERPL-SCNC: 3.7 MMOL/L (ref 3.4–5.3)
PROT SERPL-MCNC: 7.2 G/DL (ref 6.8–8.8)
RBC # BLD AUTO: 4.83 10E12/L (ref 3.8–5.2)
SODIUM SERPL-SCNC: 137 MMOL/L (ref 133–144)
TIBC SERPL-MCNC: 313 UG/DL (ref 240–430)
WBC # BLD AUTO: 12 10E9/L (ref 4–11)

## 2021-03-24 PROCEDURE — 85025 COMPLETE CBC W/AUTO DIFF WBC: CPT | Performed by: FAMILY MEDICINE

## 2021-03-24 PROCEDURE — 80053 COMPREHEN METABOLIC PANEL: CPT | Performed by: FAMILY MEDICINE

## 2021-03-24 PROCEDURE — 99396 PREV VISIT EST AGE 40-64: CPT | Performed by: FAMILY MEDICINE

## 2021-03-24 PROCEDURE — 83540 ASSAY OF IRON: CPT | Performed by: FAMILY MEDICINE

## 2021-03-24 PROCEDURE — 36415 COLL VENOUS BLD VENIPUNCTURE: CPT | Performed by: FAMILY MEDICINE

## 2021-03-24 PROCEDURE — 83550 IRON BINDING TEST: CPT | Performed by: FAMILY MEDICINE

## 2021-03-24 PROCEDURE — 83036 HEMOGLOBIN GLYCOSYLATED A1C: CPT | Performed by: FAMILY MEDICINE

## 2021-03-24 PROCEDURE — 83735 ASSAY OF MAGNESIUM: CPT | Performed by: FAMILY MEDICINE

## 2021-03-24 ASSESSMENT — PAIN SCALES - GENERAL: PAINLEVEL: NO PAIN (0)

## 2021-03-24 NOTE — NURSING NOTE
"Chief Complaint   Patient presents with     Physical       Initial /78 (BP Location: Left arm, Patient Position: Sitting, Cuff Size: Adult Regular)   Pulse 75   Temp 96.9  F (36.1  C) (Tympanic)   Resp 20   Wt 76.7 kg (169 lb)   LMP 04/11/2016 (LMP Unknown)   SpO2 98%   BMI 29.01 kg/m   Estimated body mass index is 29.01 kg/m  as calculated from the following:    Height as of 1/13/21: 1.626 m (5' 4\").    Weight as of this encounter: 76.7 kg (169 lb).  Medication Reconciliation: complete  Bossman Hong LPN  "

## 2021-03-30 DIAGNOSIS — J32.9 CHRONIC CONGESTION OF PARANASAL SINUS: ICD-10-CM

## 2021-03-30 RX ORDER — FLUTICASONE PROPIONATE 50 MCG
SPRAY, SUSPENSION (ML) NASAL
Qty: 16 G | Refills: 3 | Status: SHIPPED | OUTPATIENT
Start: 2021-03-30 | End: 2021-07-28

## 2021-04-06 ENCOUNTER — IMMUNIZATION (OUTPATIENT)
Dept: FAMILY MEDICINE | Facility: OTHER | Age: 51
End: 2021-04-06
Attending: FAMILY MEDICINE
Payer: COMMERCIAL

## 2021-04-06 PROCEDURE — 0002A PR COVID VAC PFIZER DIL RECON 30 MCG/0.3 ML IM: CPT

## 2021-04-06 PROCEDURE — 91300 PR COVID VAC PFIZER DIL RECON 30 MCG/0.3 ML IM: CPT

## 2021-04-11 ENCOUNTER — APPOINTMENT (OUTPATIENT)
Dept: CT IMAGING | Facility: HOSPITAL | Age: 51
End: 2021-04-11
Attending: EMERGENCY MEDICINE
Payer: COMMERCIAL

## 2021-04-11 ENCOUNTER — MYC MEDICAL ADVICE (OUTPATIENT)
Dept: FAMILY MEDICINE | Facility: OTHER | Age: 51
End: 2021-04-11

## 2021-04-11 ENCOUNTER — HOSPITAL ENCOUNTER (EMERGENCY)
Facility: HOSPITAL | Age: 51
Discharge: HOME | End: 2021-04-11
Attending: EMERGENCY MEDICINE | Admitting: EMERGENCY MEDICINE
Payer: COMMERCIAL

## 2021-04-11 VITALS
SYSTOLIC BLOOD PRESSURE: 149 MMHG | OXYGEN SATURATION: 98 % | TEMPERATURE: 98.9 F | RESPIRATION RATE: 16 BRPM | HEART RATE: 79 BPM | DIASTOLIC BLOOD PRESSURE: 84 MMHG

## 2021-04-11 DIAGNOSIS — R10.31 ABDOMINAL PAIN, RIGHT LOWER QUADRANT: ICD-10-CM

## 2021-04-11 DIAGNOSIS — K57.32 DIVERTICULITIS OF COLON: ICD-10-CM

## 2021-04-11 LAB
ALBUMIN SERPL-MCNC: 3.6 G/DL (ref 3.4–5)
ALBUMIN UR-MCNC: NEGATIVE MG/DL
ALP SERPL-CCNC: 67 U/L (ref 40–150)
ALT SERPL W P-5'-P-CCNC: 16 U/L (ref 0–50)
ANION GAP SERPL CALCULATED.3IONS-SCNC: 6 MMOL/L (ref 3–14)
APPEARANCE UR: CLEAR
AST SERPL W P-5'-P-CCNC: 8 U/L (ref 0–45)
BACTERIA #/AREA URNS HPF: ABNORMAL /HPF
BASOPHILS # BLD AUTO: 0.1 10E9/L (ref 0–0.2)
BASOPHILS NFR BLD AUTO: 0.3 %
BILIRUB SERPL-MCNC: 1.1 MG/DL (ref 0.2–1.3)
BILIRUB UR QL STRIP: NEGATIVE
BUN SERPL-MCNC: 9 MG/DL (ref 7–30)
CALCIUM SERPL-MCNC: 8.7 MG/DL (ref 8.5–10.1)
CHLORIDE SERPL-SCNC: 105 MMOL/L (ref 94–109)
CO2 SERPL-SCNC: 26 MMOL/L (ref 20–32)
COLOR UR AUTO: ABNORMAL
CREAT SERPL-MCNC: 0.61 MG/DL (ref 0.52–1.04)
DIFFERENTIAL METHOD BLD: ABNORMAL
EOSINOPHIL # BLD AUTO: 0 10E9/L (ref 0–0.7)
EOSINOPHIL NFR BLD AUTO: 0.1 %
ERYTHROCYTE [DISTWIDTH] IN BLOOD BY AUTOMATED COUNT: 12.8 % (ref 10–15)
GFR SERPL CREATININE-BSD FRML MDRD: >90 ML/MIN/{1.73_M2}
GLUCOSE SERPL-MCNC: 110 MG/DL (ref 70–99)
GLUCOSE UR STRIP-MCNC: NEGATIVE MG/DL
HCG UR QL: NEGATIVE
HCT VFR BLD AUTO: 43.5 % (ref 35–47)
HGB BLD-MCNC: 14.5 G/DL (ref 11.7–15.7)
HGB UR QL STRIP: ABNORMAL
IMM GRANULOCYTES # BLD: 0.1 10E9/L (ref 0–0.4)
IMM GRANULOCYTES NFR BLD: 0.5 %
KETONES UR STRIP-MCNC: NEGATIVE MG/DL
LEUKOCYTE ESTERASE UR QL STRIP: NEGATIVE
LIPASE SERPL-CCNC: 103 U/L (ref 73–393)
LYMPHOCYTES # BLD AUTO: 1.5 10E9/L (ref 0.8–5.3)
LYMPHOCYTES NFR BLD AUTO: 7.8 %
MCH RBC QN AUTO: 29.7 PG (ref 26.5–33)
MCHC RBC AUTO-ENTMCNC: 33.3 G/DL (ref 31.5–36.5)
MCV RBC AUTO: 89 FL (ref 78–100)
MONOCYTES # BLD AUTO: 1.6 10E9/L (ref 0–1.3)
MONOCYTES NFR BLD AUTO: 8.7 %
MUCOUS THREADS #/AREA URNS LPF: PRESENT /LPF
NEUTROPHILS # BLD AUTO: 15.5 10E9/L (ref 1.6–8.3)
NEUTROPHILS NFR BLD AUTO: 82.6 %
NITRATE UR QL: NEGATIVE
NRBC # BLD AUTO: 0 10*3/UL
NRBC BLD AUTO-RTO: 0 /100
PH UR STRIP: 5.5 PH (ref 4.7–8)
PLATELET # BLD AUTO: 342 10E9/L (ref 150–450)
POTASSIUM SERPL-SCNC: 3.7 MMOL/L (ref 3.4–5.3)
PROT SERPL-MCNC: 7.2 G/DL (ref 6.8–8.8)
RBC # BLD AUTO: 4.88 10E12/L (ref 3.8–5.2)
RBC #/AREA URNS AUTO: 1 /HPF (ref 0–2)
SODIUM SERPL-SCNC: 137 MMOL/L (ref 133–144)
SOURCE: ABNORMAL
SP GR UR STRIP: 1.01 (ref 1–1.03)
SQUAMOUS #/AREA URNS AUTO: 0 /HPF (ref 0–1)
UROBILINOGEN UR STRIP-MCNC: NORMAL MG/DL (ref 0–2)
WBC # BLD AUTO: 18.8 10E9/L (ref 4–11)
WBC #/AREA URNS AUTO: 0 /HPF (ref 0–5)

## 2021-04-11 PROCEDURE — 81025 URINE PREGNANCY TEST: CPT | Performed by: EMERGENCY MEDICINE

## 2021-04-11 PROCEDURE — 83690 ASSAY OF LIPASE: CPT | Performed by: EMERGENCY MEDICINE

## 2021-04-11 PROCEDURE — 99285 EMERGENCY DEPT VISIT HI MDM: CPT | Mod: 25

## 2021-04-11 PROCEDURE — 74177 CT ABD & PELVIS W/CONTRAST: CPT

## 2021-04-11 PROCEDURE — 255N000002 HC RX 255 OP 636: Performed by: EMERGENCY MEDICINE

## 2021-04-11 PROCEDURE — 85025 COMPLETE CBC W/AUTO DIFF WBC: CPT | Performed by: EMERGENCY MEDICINE

## 2021-04-11 PROCEDURE — 80053 COMPREHEN METABOLIC PANEL: CPT | Performed by: EMERGENCY MEDICINE

## 2021-04-11 PROCEDURE — 81001 URINALYSIS AUTO W/SCOPE: CPT | Performed by: EMERGENCY MEDICINE

## 2021-04-11 PROCEDURE — 36415 COLL VENOUS BLD VENIPUNCTURE: CPT

## 2021-04-11 PROCEDURE — 99284 EMERGENCY DEPT VISIT MOD MDM: CPT | Performed by: EMERGENCY MEDICINE

## 2021-04-11 RX ORDER — IOPAMIDOL 612 MG/ML
100 INJECTION, SOLUTION INTRAVASCULAR ONCE
Status: COMPLETED | OUTPATIENT
Start: 2021-04-11 | End: 2021-04-11

## 2021-04-11 RX ADMIN — IOPAMIDOL 100 ML: 612 INJECTION, SOLUTION INTRAVENOUS at 10:32

## 2021-04-11 NOTE — ED PROVIDER NOTES
History     Chief Complaint   Patient presents with     Abdominal Pain     started last night     HPI  Veronica Ansari is a 51 year old female who presents with right lower quadrant abdominal pain.  She reports a history of IBS but states her symptoms with this usually feel different.  She has had right lower quadrant abdominal pain which is described as cramping and nonradiating developing last evening.  She was able to eat dinner last night has not had breakfast this morning.  She reports no nausea or vomiting.  Has had somewhat irregular stools with intermittent very small bowel movements through the day yesterday and into today, no watery diarrhea or blood in her stool that she has noted.  She is not having any dysuria or hematuria that she has noticed.  No abnormal vaginal bleeding or discharge.  No fevers.  No respiratory symptoms.  Reports history of cholecystectomy.    Allergies:  Allergies   Allergen Reactions     Augmentin Diarrhea     Doxycycline Unknown     Flagyl [Metronidazole] Other (See Comments)     Food      Mangoes cause rash     Sulfamethoxazole W/Trimethoprim Rash       Problem List:    Patient Active Problem List    Diagnosis Date Noted     Esophageal ulcer 05/21/2020     Priority: Medium     Added automatically from request for surgery [id number]       Biliary dyskinesia 11/14/2018     Priority: Medium     S/P abdominoplasty 04/02/2018     Priority: Medium     Overview:   complicated by right calf DVT       Irritable bowel syndrome with diarrhea 11/14/2017     Priority: Medium     Excessive or frequent menstruation 04/19/2016     Priority: Medium     Iron deficiency 05/23/2014     Priority: Medium     Overview:   Without anemia       Cervicalgia 09/05/2013     Priority: Medium     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       H/O deep venous thrombosis 03/10/2007     Priority: Medium     Overview:   Post-operative LE DVT       Fibrocystic  breast disease 2006     Priority: Medium     Sinusitis, chronic 2000     Priority: Medium     Overview:   Chronic  IMO Update 10/11       Deviated nasal septum 2000     Priority: Medium        Past Medical History:    Past Medical History:   Diagnosis Date     Cervicalgia      Deviated nasal septum      Embolism and thrombosis (H)      Fibrocystic breast disease      Migraine      Prothrombin mutation (H)      Sinusitis        Past Surgical History:    Past Surgical History:   Procedure Laterality Date     ABDOMINOPLASTY       C BREAST AUGMENTATION       C LAP CHOLECYSTOSOMY        SECTION      x2     COLONOSCOPY - HIM SCAN  2011    Colonoscopy 2011     COLONOSCOPY - HIM SCAN  2018    Procedure:  Colonoscopy diagnostic with polypectomy and biopsies;  Surgeon:  Danelle Swift MD;  Location:  [facility] OR     COLONOSCOPY - HIM SCAN  2007    Colonoscopy, flex, diagnostic     DILATE CERVIX, HYSTEROSCOPY, ABLATE ENDOMETRIUM, COMBINED N/A 2016    Procedure: COMBINED DILATE CERVIX, HYSTEROSCOPY, ABLATE ENDOMETRIUM;  Surgeon: Pacheco Curry MD;  Location: [facility] OR     ESOPHAGOGASTRODUODENOSCOPY      negative     ESOPHAGOSCOPY, GASTROSCOPY, DUODENOSCOPY (EGD), COMBINED N/A 2020    Procedure: Upper Endoscopy with biopsy;  Surgeon: Son Cooper MD;  Location: [facility] OR       Family History:    No family history on file.    Social History:  Marital Status:   [2]  Social History     Tobacco Use     Smoking status: Former Smoker     Years: 3.00     Types: Cigarettes     Quit date:      Years since quittin.2     Smokeless tobacco: Never Used   Substance Use Topics     Alcohol use: Yes     Alcohol/week: 0.0 standard drinks     Comment: Occassional     Drug use: No        Medications:    amoxicillin-clavulanate (AUGMENTIN) 875-125 MG tablet  fluticasone (FLONASE) 50 MCG/ACT nasal spray  omeprazole (PRILOSEC) 40 MG DR capsule      Review of Systems   All systems  reviewed and negative except as noted in HPI.    Physical Exam   BP: 157/91  Pulse: 85  Temp: 98.9  F (37.2  C)  Resp: 16  SpO2: 98 %  Constitutional: Well developed, Well nourished, NAD.  HENT: Normocephalic, Atraumatic, Bilateral external ears normal. Neck Supple.  Eyes: EOMI, Conjunctiva normal.  Respiratory: Breathing comfortably on room air. Speaks full sentences easily. Lungs clear to ascultation.  Cardiovascular: Normal heart rate, Regular rhythm. No peripheral edema.  Abdomen: Some tenderness with involuntary guarding in the right lower quadrant.  Remainder of abdomen is soft and nontender.  Musculoskeletal: Good range of motion in all major joints. No major deformities noted.  Integument: Warm, Dry.  Neurologic: Alert & awake, Normal motor function, Normal sensory function, No focal deficits noted.   Psychiatric: Cooperative. Mood and affect normal.    ED Course     Results for orders placed or performed during the hospital encounter of 04/11/21 (from the past 24 hour(s))   HCG qualitative urine   Result Value Ref Range    HCG Qual Urine Negative NEG^Negative   UA with Microscopic reflex to Culture    Specimen: Midstream Urine   Result Value Ref Range    Color Urine Light Yellow     Appearance Urine Clear     Glucose Urine Negative NEG^Negative mg/dL    Bilirubin Urine Negative NEG^Negative    Ketones Urine Negative NEG^Negative mg/dL    Specific Gravity Urine 1.010 1.003 - 1.035    Blood Urine Small (A) NEG^Negative    pH Urine 5.5 4.7 - 8.0 pH    Protein Albumin Urine Negative NEG^Negative mg/dL    Urobilinogen mg/dL Normal 0.0 - 2.0 mg/dL    Nitrite Urine Negative NEG^Negative    Leukocyte Esterase Urine Negative NEG^Negative    Source Midstream Urine     WBC Urine 0 0 - 5 /HPF    RBC Urine 1 0 - 2 /HPF    Bacteria Urine None (A) NEG^Negative /HPF    Squamous Epithelial /HPF Urine 0 0 - 1 /HPF    Mucous Urine Present (A) NEG^Negative /LPF   CBC with platelets differential   Result Value Ref Range    WBC  18.8 (H) 4.0 - 11.0 10e9/L    RBC Count 4.88 3.8 - 5.2 10e12/L    Hemoglobin 14.5 11.7 - 15.7 g/dL    Hematocrit 43.5 35.0 - 47.0 %    MCV 89 78 - 100 fl    MCH 29.7 26.5 - 33.0 pg    MCHC 33.3 31.5 - 36.5 g/dL    RDW 12.8 10.0 - 15.0 %    Platelet Count 342 150 - 450 10e9/L    Diff Method Automated Method     % Neutrophils 82.6 %    % Lymphocytes 7.8 %    % Monocytes 8.7 %    % Eosinophils 0.1 %    % Basophils 0.3 %    % Immature Granulocytes 0.5 %    Nucleated RBCs 0 0 /100    Absolute Neutrophil 15.5 (H) 1.6 - 8.3 10e9/L    Absolute Lymphocytes 1.5 0.8 - 5.3 10e9/L    Absolute Monocytes 1.6 (H) 0.0 - 1.3 10e9/L    Absolute Eosinophils 0.0 0.0 - 0.7 10e9/L    Absolute Basophils 0.1 0.0 - 0.2 10e9/L    Abs Immature Granulocytes 0.1 0 - 0.4 10e9/L    Absolute Nucleated RBC 0.0    Comprehensive metabolic panel   Result Value Ref Range    Sodium 137 133 - 144 mmol/L    Potassium 3.7 3.4 - 5.3 mmol/L    Chloride 105 94 - 109 mmol/L    Carbon Dioxide 26 20 - 32 mmol/L    Anion Gap 6 3 - 14 mmol/L    Glucose 110 (H) 70 - 99 mg/dL    Urea Nitrogen 9 7 - 30 mg/dL    Creatinine 0.61 0.52 - 1.04 mg/dL    GFR Estimate >90 >60 mL/min/[1.73_m2]    GFR Estimate If Black >90 >60 mL/min/[1.73_m2]    Calcium 8.7 8.5 - 10.1 mg/dL    Bilirubin Total 1.1 0.2 - 1.3 mg/dL    Albumin 3.6 3.4 - 5.0 g/dL    Protein Total 7.2 6.8 - 8.8 g/dL    Alkaline Phosphatase 67 40 - 150 U/L    ALT 16 0 - 50 U/L    AST 8 0 - 45 U/L   Lipase   Result Value Ref Range    Lipase 103 73 - 393 U/L   CT Abdomen Pelvis w Contrast    Narrative    CT ABDOMEN PELVIS W CONTRAST    CLINICAL HISTORY: Female, age 51 years,  RLQ abdominal pain,  appendicitis suspected (Age >= 14y);    Comparison:  CT scan abdomen and pelvis 10/13/2016    TECHNIQUE:  CT was performed of the abdomen and pelvis with IV  contrast. Sagittal, coronal, axial and MIP reconstructions were  reviewed.     FINDINGS:  There is of atelectasis are seen in the dependent portions of both  lungs.  Visualized portions of the heart are normal.    The gallbladder surgically absent. The liver, spleen, pancreas and  adrenal glands are normal.    The stomach, duodenum and distal esophagus are normal.    Small parapelvic cysts are again seen in the left kidney. Kidneys  otherwise normal. Ureters and urinary bladder are normal.    The appendix is normal.    There are numerous diverticula seen throughout the colon. There is  circumferential wall thickening of the sigmoid colon with pericolonic  fat stranding. Small volume of free fluid extends into the lower  pelvis.    The ovaries are not well seen.. Prominent follicles in both the left  and right ovary. Uterus is mildly enlarged. There is a 1.6 x 11.4 cm  cystic structure in the cervix suggesting an enlarging nabothian cyst  which was present previously.    Inguinal lymph nodes are normal.     Bony structures demonstrate no acute abnormality. Postoperative  changes and degenerative changes within the lower lumbar spine are  similar in appearance.      Impression    IMPRESSION:   Normal-appearing appendix.    Sigmoid colitis which may be related to diverticulitis. No distinct  evidence of well-defined abscess or perforation. A 2 cm x 1.5 cm low  dense focus in the left hemipelvis adjacent to the inflamed portions  of the colon likely represents a cyst/prominent follicle of the left  ovary.    No evidence of acute abnormality of the kidneys. Visualized portions  of the ureters are also normal    Chronic changes as described above.    MAL VELÁSQUEZ MD       Medications   iopamidol (ISOVUE-300) IV solution 61% 100 mL (100 mLs Intravenous Given 4/11/21 1032)   sodium chloride (PF) 0.9% PF flush 60 mL (60 mLs Intravenous Given 4/11/21 1032)       Assessments & Plan (with Medical Decision Making)   Patient is a 51-year-old female who presents with right lower quadrant abdominal pain.  History of IBS.  She has some focal tenderness in her right lower quadrant but the  remainder of abdominal exam seems to be benign at this time.  Her vital signs are stable and she is nontoxic-appearing.  She does have a white count of 18 with a left shift.  The remainder of her lab work-up looks stable and reassuring.  She underwent CT imaging of her abdomen pelvis which showed a normal appendix but did show some continued inflammatory changes around her sigmoid colon concerning for possible diverticulitis.  Although I am not sure this fully correlates with the area of her pain and tenderness, she does have a leukocytosis with left shift and in the context of any increasing abdominal pain and stool irregularities, I think it would be worth treating her with a course of antibiotics for diverticulitis.  I ordered her 7 days of Augmentin.  She declined any further pain medication.  She seems quite stable and should be able to discharge home safely at this time.  Recommended follow-up this week with primary care and reviewed ED return precautions.  Patient left the ED in stable condition.    I have reviewed the nursing notes.    I have reviewed the findings, diagnosis, plan and need for follow up with the patient.    Final diagnoses:   Abdominal pain, right lower quadrant   Diverticulitis of colon       4/11/2021   [facility] EMERGENCY [facility]PART[facility]     Salvador Boyd MD  04/11/21 1139       Salvador Boyd MD  04/11/21 1139

## 2021-04-11 NOTE — DISCHARGE INSTRUCTIONS
You were seen today in the [facility] emergency department for abdominal pain.  Your CT showed no evidence of appendicitis, but there was some inflammatory change adjacent to your colon which looked concerning for recurrent mild diverticulitis.  Since your white blood cell count elevated significantly and with these findings on CT, we would recommend a course of antibiotics.  We are going to prescribe you an antibiotic called Augmentin which you can take twice a day for 7 days.  Please plan to follow-up with your primary doctor sometime this week to review any ongoing symptoms.  If you have any other immediate concerns like high fevers and weakness, severe evolving abdominal pain, intractable vomiting, etc. we can reevaluate in the ER at any time.

## 2021-04-11 NOTE — ED NOTES
Pt given instruct via avs, rates pain 4/10 and is tolerable, is aware md boston scribed script to [place] per pt's request.

## 2021-04-13 ENCOUNTER — OFFICE VISIT (OUTPATIENT)
Dept: FAMILY MEDICINE | Facility: OTHER | Age: 51
End: 2021-04-13
Attending: FAMILY MEDICINE
Payer: COMMERCIAL

## 2021-04-13 ENCOUNTER — NURSE TRIAGE (OUTPATIENT)
Dept: FAMILY MEDICINE | Facility: OTHER | Age: 51
End: 2021-04-13

## 2021-04-13 VITALS
TEMPERATURE: 96.4 F | WEIGHT: 165 LBS | SYSTOLIC BLOOD PRESSURE: 124 MMHG | OXYGEN SATURATION: 98 % | DIASTOLIC BLOOD PRESSURE: 76 MMHG | HEART RATE: 88 BPM | BODY MASS INDEX: 28.32 KG/M2

## 2021-04-13 DIAGNOSIS — K58.0 IRRITABLE BOWEL SYNDROME WITH DIARRHEA: ICD-10-CM

## 2021-04-13 DIAGNOSIS — K57.32 SIGMOID DIVERTICULITIS: Primary | ICD-10-CM

## 2021-04-13 LAB
ALBUMIN SERPL-MCNC: 3.6 G/DL (ref 3.4–5)
ALP SERPL-CCNC: 71 U/L (ref 40–150)
ALT SERPL W P-5'-P-CCNC: 17 U/L (ref 0–50)
ANION GAP SERPL CALCULATED.3IONS-SCNC: 5 MMOL/L (ref 3–14)
AST SERPL W P-5'-P-CCNC: 5 U/L (ref 0–45)
BASOPHILS # BLD AUTO: 0.1 10E9/L (ref 0–0.2)
BASOPHILS NFR BLD AUTO: 0.5 %
BILIRUB SERPL-MCNC: 0.6 MG/DL (ref 0.2–1.3)
BUN SERPL-MCNC: 8 MG/DL (ref 7–30)
CALCIUM SERPL-MCNC: 8.9 MG/DL (ref 8.5–10.1)
CHLORIDE SERPL-SCNC: 103 MMOL/L (ref 94–109)
CO2 SERPL-SCNC: 28 MMOL/L (ref 20–32)
CREAT SERPL-MCNC: 0.71 MG/DL (ref 0.52–1.04)
CRP SERPL-MCNC: 77 MG/L (ref 0–8)
DIFFERENTIAL METHOD BLD: NORMAL
EOSINOPHIL # BLD AUTO: 0.1 10E9/L (ref 0–0.7)
EOSINOPHIL NFR BLD AUTO: 0.6 %
ERYTHROCYTE [DISTWIDTH] IN BLOOD BY AUTOMATED COUNT: 13 % (ref 10–15)
GFR SERPL CREATININE-BSD FRML MDRD: >90 ML/MIN/{1.73_M2}
GLUCOSE SERPL-MCNC: 87 MG/DL (ref 70–99)
HCT VFR BLD AUTO: 44.7 % (ref 35–47)
HGB BLD-MCNC: 14.6 G/DL (ref 11.7–15.7)
IMM GRANULOCYTES # BLD: 0.1 10E9/L (ref 0–0.4)
IMM GRANULOCYTES NFR BLD: 1.1 %
LIPASE SERPL-CCNC: 102 U/L (ref 73–393)
LYMPHOCYTES # BLD AUTO: 1.2 10E9/L (ref 0.8–5.3)
LYMPHOCYTES NFR BLD AUTO: 12.2 %
MCH RBC QN AUTO: 29.7 PG (ref 26.5–33)
MCHC RBC AUTO-ENTMCNC: 32.7 G/DL (ref 31.5–36.5)
MCV RBC AUTO: 91 FL (ref 78–100)
MONOCYTES # BLD AUTO: 1 10E9/L (ref 0–1.3)
MONOCYTES NFR BLD AUTO: 10.2 %
NEUTROPHILS # BLD AUTO: 7.3 10E9/L (ref 1.6–8.3)
NEUTROPHILS NFR BLD AUTO: 75.4 %
NRBC # BLD AUTO: 0 10*3/UL
NRBC BLD AUTO-RTO: 0 /100
PLATELET # BLD AUTO: 390 10E9/L (ref 150–450)
POTASSIUM SERPL-SCNC: 3.8 MMOL/L (ref 3.4–5.3)
PROT SERPL-MCNC: 7.7 G/DL (ref 6.8–8.8)
RBC # BLD AUTO: 4.91 10E12/L (ref 3.8–5.2)
SODIUM SERPL-SCNC: 136 MMOL/L (ref 133–144)
WBC # BLD AUTO: 9.7 10E9/L (ref 4–11)

## 2021-04-13 PROCEDURE — 99214 OFFICE O/P EST MOD 30 MIN: CPT | Performed by: FAMILY MEDICINE

## 2021-04-13 PROCEDURE — 85025 COMPLETE CBC W/AUTO DIFF WBC: CPT | Performed by: FAMILY MEDICINE

## 2021-04-13 PROCEDURE — 83690 ASSAY OF LIPASE: CPT | Performed by: FAMILY MEDICINE

## 2021-04-13 PROCEDURE — 80053 COMPREHEN METABOLIC PANEL: CPT | Performed by: FAMILY MEDICINE

## 2021-04-13 PROCEDURE — 86140 C-REACTIVE PROTEIN: CPT | Performed by: FAMILY MEDICINE

## 2021-04-13 PROCEDURE — 36415 COLL VENOUS BLD VENIPUNCTURE: CPT | Performed by: FAMILY MEDICINE

## 2021-04-13 RX ORDER — METRONIDAZOLE 500 MG/1
TABLET ORAL
COMMUNITY
Start: 2021-04-11 | End: 2021-04-20

## 2021-04-13 RX ORDER — ONDANSETRON 4 MG/1
4 TABLET, FILM COATED ORAL EVERY 8 HOURS PRN
Qty: 21 TABLET | Refills: 0 | Status: SHIPPED | OUTPATIENT
Start: 2021-04-13 | End: 2021-04-20

## 2021-04-13 RX ORDER — CIPROFLOXACIN 500 MG/1
500 TABLET, FILM COATED ORAL 2 TIMES DAILY
COMMUNITY
Start: 2021-04-11 | End: 2021-04-20

## 2021-04-13 ASSESSMENT — ENCOUNTER SYMPTOMS
APPETITE CHANGE: 1
ABDOMINAL PAIN: 1
HEMATOCHEZIA: 0
CONSTIPATION: 1
PALPITATIONS: 0
DIFFICULTY URINATING: 0
SHORTNESS OF BREATH: 0
NAUSEA: 0
FEVER: 1
ABDOMINAL DISTENTION: 1
DIARRHEA: 0
RECTAL PAIN: 1
VOMITING: 0
CHILLS: 1

## 2021-04-13 ASSESSMENT — PAIN SCALES - GENERAL: PAINLEVEL: MODERATE PAIN (5)

## 2021-04-13 NOTE — NURSING NOTE
"Chief Complaint   Patient presents with     ER F/U       Initial /76   Pulse 88   Temp 96.4  F (35.8  C) (Tympanic)   Wt 74.8 kg (165 lb)   LMP 04/11/2016 (LMP Unknown)   SpO2 98%   BMI 28.32 kg/m   Estimated body mass index is 28.32 kg/m  as calculated from the following:    Height as of 1/13/21: 1.626 m (5' 4\").    Weight as of this encounter: 74.8 kg (165 lb).  Medication Reconciliation: complete  Luis Montalvo LPN  "

## 2021-04-13 NOTE — TELEPHONE ENCOUNTER
Emergency Department and Urgent Care Follow-up      Reason for ER/UC visit: diverticulitis   o Date seen: 04/11      New or Worsening symptoms:  same       Prescription Received/Picked up from Pharmacy?: cipro and metronidazole   o Medications started? yes  o Any questions or issues regarding your prescription?: no      Follow-up Results or Labs that are pending: No      Questions or concerns?: see PCP      ER Recommends Follow-up by: PCP this week      RN Recommendations: Follow up PCP  o Appointment scheduled: yes. Today with covering provider.    If you start feeling worse, or have any further questions, please feel free to contact Nurse Triage at [phone].  If needing immediate medical attention at any time please call 911/Go to the ER.

## 2021-04-13 NOTE — PROGRESS NOTES
Assessment & Plan     Sigmoid diverticulitis / Irritable bowel syndrome with diarrhea  Will obtain labs d/t no significant improvements since Monday. No f/c. Exam benign.   CRP is elevated, but no fevers, WBC normal, no left shift making abscess unlikely.   last colonoscopy (6/1/2018, [facility]): Tubular adenoma. Repeat in 5 years  - CBC with platelets and differential  - Comprehensive metabolic panel (BMP + Alb, Alk Phos, ALT, AST, Total. Bili, TP)  - CRP, inflammation  - Lipase  - ondansetron (ZOFRAN) 4 MG tablet; Take 1 tablet (4 mg) by mouth every 8 hours as needed for nausea  - GENERAL SURG ADULT REFERRAL, consideration for repeat colonoscopy d/t diverticulitis   - c/w cipro / flagyl   - continue to advance diet as tolerated.   6}     35 minutes spent on the date of the encounter doing chart review ([facility] PCP, ER notes), review of test results, interpretation of tests, patient visit, documentation and referral to general sx      See Patient Instructions    Return if symptoms worsen or fail to improve.    Shari Albright MD  [facility] - JOHANA Martin is a 51 year old who presents for the following health issues   HPI     ED/UC Followup:    [place]  [facility] [facility]  Date of visit: 4/11/2021  Reason for visit: abd pain. H/o IBS. UA negative. WBC 18.8. CT ap: sigmoid colitis. Discharged with augmentin x7d  Current Status: better than Saturday night, but not improvement since Sunday. Feeling bloated. Pain w/ movement. F/c resolved.      - last colonoscopy (6/1/2018, [facility]): Tubular adenoma. Repeat in 5 years  - augmentin resulting in c diff  - h/o diverticulitis, couple of years ago. tx w/ cipro / flagyl   - cipro/flagyl started Sunday night      Review of Systems   Constitutional: Positive for appetite change (decreased), chills (not today) and fever (not today).   HENT: Negative for congestion.    Respiratory: Negative for shortness of breath.     Cardiovascular: Negative for chest pain and palpitations.   Gastrointestinal: Positive for abdominal distention, abdominal pain (w/ movement), constipation and rectal pain. Negative for diarrhea, hematochezia, nausea and vomiting.   Genitourinary: Negative for difficulty urinating.          Objective    /76   Pulse 88   Temp 96.4  F (35.8  C) (Tympanic)   Wt 74.8 kg (165 lb)   LMP 04/11/2016 (LMP Unknown)   SpO2 98%   BMI 28.32 kg/m    Body mass index is 28.32 kg/m .  Physical Exam  Constitutional:       General: She is not in acute distress.     Appearance: She is not ill-appearing.   Cardiovascular:      Rate and Rhythm: Normal rate and regular rhythm.      Heart sounds: No murmur.   Pulmonary:      Effort: Pulmonary effort is normal. No respiratory distress.      Breath sounds: No wheezing or rales.   Abdominal:      General: Bowel sounds are normal.      Tenderness: There is abdominal tenderness (mild) in the right lower quadrant, suprapubic area and left lower quadrant.   Neurological:      Mental Status: She is alert.        Results for orders placed or performed in visit on 04/13/21 (from the past 24 hour(s))   CBC with platelets and differential   Result Value Ref Range    WBC 9.7 4.0 - 11.0 10e9/L    RBC Count 4.91 3.8 - 5.2 10e12/L    Hemoglobin 14.6 11.7 - 15.7 g/dL    Hematocrit 44.7 35.0 - 47.0 %    MCV 91 78 - 100 fl    MCH 29.7 26.5 - 33.0 pg    MCHC 32.7 31.5 - 36.5 g/dL    RDW 13.0 10.0 - 15.0 %    Platelet Count 390 150 - 450 10e9/L    Diff Method Automated Method     % Neutrophils 75.4 %    % Lymphocytes 12.2 %    % Monocytes 10.2 %    % Eosinophils 0.6 %    % Basophils 0.5 %    % Immature Granulocytes 1.1 %    Nucleated RBCs 0 0 /100    Absolute Neutrophil 7.3 1.6 - 8.3 10e9/L    Absolute Lymphocytes 1.2 0.8 - 5.3 10e9/L    Absolute Monocytes 1.0 0.0 - 1.3 10e9/L    Absolute Eosinophils 0.1 0.0 - 0.7 10e9/L    Absolute Basophils 0.1 0.0 - 0.2 10e9/L    Abs Immature Granulocytes 0.1  0 - 0.4 10e9/L    Absolute Nucleated RBC 0.0    Comprehensive metabolic panel (BMP + Alb, Alk Phos, ALT, AST, Total. Bili, TP)   Result Value Ref Range    Sodium 136 133 - 144 mmol/L    Potassium 3.8 3.4 - 5.3 mmol/L    Chloride 103 94 - 109 mmol/L    Carbon Dioxide 28 20 - 32 mmol/L    Anion Gap 5 3 - 14 mmol/L    Glucose 87 70 - 99 mg/dL    Urea Nitrogen 8 7 - 30 mg/dL    Creatinine 0.71 0.52 - 1.04 mg/dL    GFR Estimate >90 >60 mL/min/[1.73_m2]    GFR Estimate If Black >90 >60 mL/min/[1.73_m2]    Calcium 8.9 8.5 - 10.1 mg/dL    Bilirubin Total 0.6 0.2 - 1.3 mg/dL    Albumin 3.6 3.4 - 5.0 g/dL    Protein Total 7.7 6.8 - 8.8 g/dL    Alkaline Phosphatase 71 40 - 150 U/L    ALT 17 0 - 50 U/L    AST 5 0 - 45 U/L   CRP, inflammation   Result Value Ref Range    CRP Inflammation 77.0 (H) 0.0 - 8.0 mg/L   Lipase   Result Value Ref Range    Lipase 102 73 - 393 U/L         Admission on 04/11/2021, Discharged on 04/11/2021   Component Date Value Ref Range Status     WBC 04/11/2021 18.8* 4.0 - 11.0 10e9/L Final     RBC Count 04/11/2021 4.88  3.8 - 5.2 10e12/L Final     Hemoglobin 04/11/2021 14.5  11.7 - 15.7 g/dL Final     Hematocrit 04/11/2021 43.5  35.0 - 47.0 % Final     MCV 04/11/2021 89  78 - 100 fl Final     MCH 04/11/2021 29.7  26.5 - 33.0 pg Final     MCHC 04/11/2021 33.3  31.5 - 36.5 g/dL Final     RDW 04/11/2021 12.8  10.0 - 15.0 % Final     Platelet Count 04/11/2021 342  150 - 450 10e9/L Final     Diff Method 04/11/2021 Automated Method   Final     % Neutrophils 04/11/2021 82.6  % Final     % Lymphocytes 04/11/2021 7.8  % Final     % Monocytes 04/11/2021 8.7  % Final     % Eosinophils 04/11/2021 0.1  % Final     % Basophils 04/11/2021 0.3  % Final     % Immature Granulocytes 04/11/2021 0.5  % Final     Nucleated RBCs 04/11/2021 0  0 /100 Final     Absolute Neutrophil 04/11/2021 15.5* 1.6 - 8.3 10e9/L Final     Absolute Lymphocytes 04/11/2021 1.5  0.8 - 5.3 10e9/L Final     Absolute Monocytes 04/11/2021 1.6* 0.0 -  1.3 10e9/L Final     Absolute Eosinophils 04/11/2021 0.0  0.0 - 0.7 10e9/L Final     Absolute Basophils 04/11/2021 0.1  0.0 - 0.2 10e9/L Final     Abs Immature Granulocytes 04/11/2021 0.1  0 - 0.4 10e9/L Final     Absolute Nucleated RBC 04/11/2021 0.0   Final     Sodium 04/11/2021 137  133 - 144 mmol/L Final     Potassium 04/11/2021 3.7  3.4 - 5.3 mmol/L Final     Chloride 04/11/2021 105  94 - 109 mmol/L Final     Carbon Dioxide 04/11/2021 26  20 - 32 mmol/L Final     Anion Gap 04/11/2021 6  3 - 14 mmol/L Final     Glucose 04/11/2021 110* 70 - 99 mg/dL Final     Urea Nitrogen 04/11/2021 9  7 - 30 mg/dL Final     Creatinine 04/11/2021 0.61  0.52 - 1.04 mg/dL Final     GFR Estimate 04/11/2021 >90  >60 mL/min/[1.73_m2] Final    Comment: Non  GFR Calc  Starting 12/18/2018, serum creatinine based estimated GFR (eGFR) will be   calculated using the Chronic Kidney Disease Epidemiology Collaboration   (CKD-EPI) equation.       GFR Estimate If Black 04/11/2021 >90  >60 mL/min/[1.73_m2] Final    Comment:  GFR Calc  Starting 12/18/2018, serum creatinine based estimated GFR (eGFR) will be   calculated using the Chronic Kidney Disease Epidemiology Collaboration   (CKD-EPI) equation.       Calcium 04/11/2021 8.7  8.5 - 10.1 mg/dL Final     Bilirubin Total 04/11/2021 1.1  0.2 - 1.3 mg/dL Final     Albumin 04/11/2021 3.6  3.4 - 5.0 g/dL Final     Protein Total 04/11/2021 7.2  6.8 - 8.8 g/dL Final     Alkaline Phosphatase 04/11/2021 67  40 - 150 U/L Final     ALT 04/11/2021 16  0 - 50 U/L Final     AST 04/11/2021 8  0 - 45 U/L Final     Lipase 04/11/2021 103  73 - 393 U/L Final     HCG Qual Urine 04/11/2021 Negative  NEG^Negative Final    Comment: This test is for screening purposes.  Results should be interpreted along with   the clinical picture.  Confirmation testing is available if warranted by   ordering [id number], HCG Quantitative Pregnancy.       Color Urine 04/11/2021 Light Yellow   Final      Appearance Urine 04/11/2021 Clear   Final     Glucose Urine 04/11/2021 Negative  NEG^Negative mg/dL Final     Bilirubin Urine 04/11/2021 Negative  NEG^Negative Final     Ketones Urine 04/11/2021 Negative  NEG^Negative mg/dL Final     Specific Gravity Urine 04/11/2021 1.010  1.003 - 1.035 Final     Blood Urine 04/11/2021 Small* NEG^Negative Final     pH Urine 04/11/2021 5.5  4.7 - 8.0 pH Final     Protein Albumin Urine 04/11/2021 Negative  NEG^Negative mg/dL Final     Urobilinogen mg/dL 04/11/2021 Normal  0.0 - 2.0 mg/dL Final     Nitrite Urine 04/11/2021 Negative  NEG^Negative Final     Leukocyte Esterase Urine 04/11/2021 Negative  NEG^Negative Final     Source 04/11/2021 Midstream Urine   Final     WBC Urine 04/11/2021 0  0 - 5 /HPF Final     RBC Urine 04/11/2021 1  0 - 2 /HPF Final     Bacteria Urine 04/11/2021 None* NEG^Negative /HPF Final     Squamous Epithelial /HPF Urine 04/11/2021 0  0 - 1 /HPF Final     Mucous Urine 04/11/2021 Present* NEG^Negative /LPF Final

## 2021-04-19 ENCOUNTER — TELEPHONE (OUTPATIENT)
Dept: FAMILY MEDICINE | Facility: OTHER | Age: 51
End: 2021-04-19

## 2021-04-19 DIAGNOSIS — N83.202 CYSTS OF BOTH OVARIES: Primary | ICD-10-CM

## 2021-04-19 DIAGNOSIS — K57.32 SIGMOID DIVERTICULITIS: ICD-10-CM

## 2021-04-19 DIAGNOSIS — N83.201 CYSTS OF BOTH OVARIES: Primary | ICD-10-CM

## 2021-04-19 NOTE — TELEPHONE ENCOUNTER
PT was wondering if she can have a referral to Dr. Curry for cysts on her ovaries as she feels that she should not be having those during menopause. Pended referral please sign if you find appropriate

## 2021-04-19 NOTE — TELEPHONE ENCOUNTER
Patient had hospital follow up 4/13/21 with Dr Albright for diverticulitis follow up.    Please clarify -  If progression of symptoms, needs to be seen more urgently.  If just questions of imaging, need for follow up, etc - can be next available.    I do have 3 same days for this Thursday - can use 1 if appropriate.

## 2021-04-19 NOTE — TELEPHONE ENCOUNTER
Referral signed.  Did also order pelvic US as CT did not visualize ovaries well.  US is preferred method for pelvic imaging.

## 2021-04-19 NOTE — TELEPHONE ENCOUNTER
10:59 AM    Reason for Call: OVERBOOK/ER FOLLOW UP    Veronica has some concerns regarding her CT Scan that was done on 4/11/2021 when she was in the ER and would like to be seen for this.     The patient is requesting an appointment for this week with Dr Jenelle Ruiz.    Was an appointment offered for this call? Yes,   Monday May 3, 2021 arrival at 8:00am      Veronica wants to see Dr Ruiz sooner than this date.    If yes : Appointment type              Date    Preferred method for responding to this message: Telephone Call  What is your phone number ? [phone]    If we cannot reach you directly, may we leave a detailed response at the number you provided? Yes    Can this message wait until your PCP/provider returns, if unavailable today? No,     Rosanna Hancock

## 2021-04-20 ENCOUNTER — OFFICE VISIT (OUTPATIENT)
Dept: SURGERY | Facility: OTHER | Age: 51
End: 2021-04-20
Attending: FAMILY MEDICINE
Payer: COMMERCIAL

## 2021-04-20 VITALS
TEMPERATURE: 98 F | DIASTOLIC BLOOD PRESSURE: 88 MMHG | BODY MASS INDEX: 27.66 KG/M2 | HEART RATE: 73 BPM | HEIGHT: 64 IN | WEIGHT: 162 LBS | OXYGEN SATURATION: 99 % | SYSTOLIC BLOOD PRESSURE: 132 MMHG

## 2021-04-20 DIAGNOSIS — K57.32 SIGMOID DIVERTICULITIS: ICD-10-CM

## 2021-04-20 PROCEDURE — 99214 OFFICE O/P EST MOD 30 MIN: CPT | Performed by: SURGERY

## 2021-04-20 ASSESSMENT — PAIN SCALES - GENERAL: PAINLEVEL: NO PAIN (0)

## 2021-04-20 ASSESSMENT — MIFFLIN-ST. JEOR: SCORE: 1324.83

## 2021-04-20 NOTE — NURSING NOTE
"Chief Complaint   Patient presents with     Consult     Sigmoid diverticulitis.  Referral from Dr. Shari Albright.       Initial /88 (BP Location: Right arm, Patient Position: Sitting, Cuff Size: Adult Regular)   Pulse 73   Temp 98  F (36.7  C) (Tympanic)   Ht 1.626 m (5' 4\")   Wt 73.5 kg (162 lb)   LMP 04/11/2016 (LMP Unknown)   SpO2 99%   BMI 27.81 kg/m   Estimated body mass index is 27.81 kg/m  as calculated from the following:    Height as of this encounter: 1.626 m (5' 4\").    Weight as of this encounter: 73.5 kg (162 lb).  Medication Reconciliation: complete  SHILPA ORTIZ LPN    "

## 2021-04-20 NOTE — PATIENT INSTRUCTIONS
Thank you for allowing Dr. Swift and our surgical team to participate in your care. Please call our health unit coordinator at [phone] with scheduling questions or the nurse at [phone] with any other questions or concerns.

## 2021-04-20 NOTE — PROGRESS NOTES
CLINIC NOTE - CONSULT  2021    Patient:Veronica Ansari  Referring Physician: Shari Albright    Reason for Referral: Recent diverticulitis    This is a 51 year old female with recent diverticulitis. Was recently in the emergency room and received a 7-day course of antibiotics. States that the pain is nearly resolved. She has been off antibiotics for 3 days. Stools are returning to normal. No fevers. No chills. No diarrhea. She did have a colonoscopy in 2018.    Past Medical History:  Past Medical History:   Diagnosis Date     Cervicalgia      Deviated nasal septum      Embolism and thrombosis (H)     unspecified site     Fibrocystic breast disease      Migraine      Prothrombin mutation (H)      Sinusitis        Past Surgical History:  Past Surgical History:   Procedure Laterality Date     ABDOMINOPLASTY       C BREAST AUGMENTATION       C LAP CHOLECYSTOSOMY        SECTION      x2     COLONOSCOPY - HIM SCAN  2011    Colonoscopy 2011     COLONOSCOPY - HIM SCAN  2018    Procedure:  Colonoscopy diagnostic with polypectomy and biopsies;  Surgeon:  Danelle Swift MD;  Location:  [facility] OR     COLONOSCOPY - HIM SCAN  2007    Colonoscopy, flex, diagnostic     DILATE CERVIX, HYSTEROSCOPY, ABLATE ENDOMETRIUM, COMBINED N/A 2016    Procedure: COMBINED DILATE CERVIX, HYSTEROSCOPY, ABLATE ENDOMETRIUM;  Surgeon: Pacheco Curry MD;  Location: [facility] OR     ESOPHAGOGASTRODUODENOSCOPY      negative     ESOPHAGOSCOPY, GASTROSCOPY, DUODENOSCOPY (EGD), COMBINED N/A 2020    Procedure: Upper Endoscopy with biopsy;  Surgeon: Son Cooper MD;  Location: [facility] OR       Family History History:  History reviewed. No pertinent family history.    History of Tobacco Use:  History   Smoking Status     Former Smoker     Years: 3.00     Types: Cigarettes     Quit date:    Smokeless Tobacco     Never Used       Current Medications:  Current Outpatient Medications   Medication Sig Dispense Refill      fluticasone (FLONASE) 50 MCG/ACT nasal spray SHAKE LIQUID AND USE 2 SPRAYS IN EACH NOSTRIL DAILY 16 g 3     omeprazole (PRILOSEC) 40 MG DR capsule TAKE 1 CAPSULE(40 MG) BY MOUTH DAILY 90 capsule 1       Allergies:  Allergies   Allergen Reactions     Augmentin Diarrhea     Doxycycline Unknown     Flagyl [Metronidazole] Other (See Comments)     Food      Mangoes cause rash     Sulfamethoxazole W/Trimethoprim Rash     RADIOLOGY:    Results for orders placed during the hospital encounter of 04/11/21   CT Abdomen Pelvis w Contrast    Narrative CT ABDOMEN PELVIS W CONTRAST    CLINICAL HISTORY: Female, age 51 years,  RLQ abdominal pain,  appendicitis suspected (Age >= 14y);    Comparison:  CT scan abdomen and pelvis 10/13/2016    TECHNIQUE:  CT was performed of the abdomen and pelvis with IV  contrast. Sagittal, coronal, axial and MIP reconstructions were  reviewed.     FINDINGS:  There is of atelectasis are seen in the dependent portions of both  lungs. Visualized portions of the heart are normal.    The gallbladder surgically absent. The liver, spleen, pancreas and  adrenal glands are normal.    The stomach, duodenum and distal esophagus are normal.    Small parapelvic cysts are again seen in the left kidney. Kidneys  otherwise normal. Ureters and urinary bladder are normal.    The appendix is normal.    There are numerous diverticula seen throughout the colon. There is  circumferential wall thickening of the sigmoid colon with pericolonic  fat stranding. Small volume of free fluid extends into the lower  pelvis.    The ovaries are not well seen.. Prominent follicles in both the left  and right ovary. Uterus is mildly enlarged. There is a 1.6 x 11.4 cm  cystic structure in the cervix suggesting an enlarging nabothian cyst  which was present previously.    Inguinal lymph nodes are normal.     Bony structures demonstrate no acute abnormality. Postoperative  changes and degenerative changes within the lower lumbar spine  "are  similar in appearance.      Impression IMPRESSION:   Normal-appearing appendix.    Sigmoid colitis which may be related to diverticulitis. No distinct  evidence of well-defined abscess or perforation. A 2 cm x 1.5 cm low  dense focus in the left hemipelvis adjacent to the inflamed portions  of the colon likely represents a cyst/prominent follicle of the left  ovary.    No evidence of acute abnormality of the kidneys. Visualized portions  of the ureters are also normal    Chronic changes as described above.    MAL VELÁSQUEZ MD         ROS:  Pertinent items are noted in HPI.  All other systems are negative.    PHYSICAL EXAM:     Vital signs: /88 (BP Location: Right arm, Patient Position: Sitting, Cuff Size: Adult Regular)   Pulse 73   Temp 98  F (36.7  C) (Tympanic)   Ht 1.626 m (5' 4\")   Wt 73.5 kg (162 lb)   LMP 04/11/2016 (LMP Unknown)   SpO2 99%   BMI 27.81 kg/m     Weight: [unfilled]   BMI: Body mass index is 27.81 kg/m .   General: Normal, healthy, cooperative, in no acute distress, alert   Skin: no jaundice   HEENT: PERRLA and EOMI   Neck: supple   Lungs: clear to auscultation   CV: Regular rate and rhythm without murmer   Abdominal: abdomen is soft without significant tenderness, masses, organomegaly or guarding   Extremities: No cyanosis, clubbing or edema noted bilaterally in Upper and Lower Extremities   Neurological: without deficit    ASSESSMENT:  51 year old female with recent diverticulitis. Improved on oral antibiotics. I did review her CT and no evidence of abscess. She has had a colonoscopy within the last 5 years.    PLAN: At this point I do not feel there is anything to do. I would not recommend a colonoscopy at this time. She continues to have problems then would recommend a colonoscopy. Discussed at length diverticulosis and diverticulitis in the treatment both medically and surgically. She continues to do well would not recommend surgery for the diverticulitis. She is " comfortable with the information. We will follow-up with me as needed.

## 2021-04-23 ENCOUNTER — MYC MEDICAL ADVICE (OUTPATIENT)
Dept: FAMILY MEDICINE | Facility: OTHER | Age: 51
End: 2021-04-23

## 2021-04-23 NOTE — TELEPHONE ENCOUNTER
Very difficult to assess over phone/mychart.  Would encourage follow up next week in person.  UC/ER if needed over weekend.  Did just have follow up with surgery this week.

## 2021-04-26 ENCOUNTER — OFFICE VISIT (OUTPATIENT)
Dept: FAMILY MEDICINE | Facility: OTHER | Age: 51
End: 2021-04-26
Attending: FAMILY MEDICINE
Payer: COMMERCIAL

## 2021-04-26 VITALS
WEIGHT: 165 LBS | BODY MASS INDEX: 28.32 KG/M2 | RESPIRATION RATE: 16 BRPM | DIASTOLIC BLOOD PRESSURE: 82 MMHG | SYSTOLIC BLOOD PRESSURE: 122 MMHG | OXYGEN SATURATION: 99 % | HEART RATE: 76 BPM | TEMPERATURE: 97.7 F

## 2021-04-26 DIAGNOSIS — R10.31 RLQ ABDOMINAL PAIN: ICD-10-CM

## 2021-04-26 DIAGNOSIS — N83.8 LEFT TUBO-OVARIAN MASS: ICD-10-CM

## 2021-04-26 DIAGNOSIS — K57.32 SIGMOID DIVERTICULITIS: Primary | ICD-10-CM

## 2021-04-26 LAB
BASOPHILS # BLD AUTO: 0.1 10E9/L (ref 0–0.2)
BASOPHILS NFR BLD AUTO: 0.5 %
CRP SERPL-MCNC: 3.2 MG/L (ref 0–8)
DIFFERENTIAL METHOD BLD: ABNORMAL
EOSINOPHIL # BLD AUTO: 0.1 10E9/L (ref 0–0.7)
EOSINOPHIL NFR BLD AUTO: 0.5 %
ERYTHROCYTE [DISTWIDTH] IN BLOOD BY AUTOMATED COUNT: 12.9 % (ref 10–15)
HCT VFR BLD AUTO: 42.3 % (ref 35–47)
HGB BLD-MCNC: 13.6 G/DL (ref 11.7–15.7)
IMM GRANULOCYTES # BLD: 0.1 10E9/L (ref 0–0.4)
IMM GRANULOCYTES NFR BLD: 0.8 %
LYMPHOCYTES # BLD AUTO: 2.3 10E9/L (ref 0.8–5.3)
LYMPHOCYTES NFR BLD AUTO: 18.7 %
MCH RBC QN AUTO: 29.3 PG (ref 26.5–33)
MCHC RBC AUTO-ENTMCNC: 32.2 G/DL (ref 31.5–36.5)
MCV RBC AUTO: 91 FL (ref 78–100)
MONOCYTES # BLD AUTO: 1.1 10E9/L (ref 0–1.3)
MONOCYTES NFR BLD AUTO: 8.4 %
NEUTROPHILS # BLD AUTO: 8.8 10E9/L (ref 1.6–8.3)
NEUTROPHILS NFR BLD AUTO: 71.1 %
NRBC # BLD AUTO: 0 10*3/UL
NRBC BLD AUTO-RTO: 0 /100
PLATELET # BLD AUTO: 444 10E9/L (ref 150–450)
RBC # BLD AUTO: 4.64 10E12/L (ref 3.8–5.2)
WBC # BLD AUTO: 12.4 10E9/L (ref 4–11)

## 2021-04-26 PROCEDURE — 99214 OFFICE O/P EST MOD 30 MIN: CPT | Performed by: FAMILY MEDICINE

## 2021-04-26 PROCEDURE — 36415 COLL VENOUS BLD VENIPUNCTURE: CPT | Performed by: FAMILY MEDICINE

## 2021-04-26 PROCEDURE — 85025 COMPLETE CBC W/AUTO DIFF WBC: CPT | Performed by: FAMILY MEDICINE

## 2021-04-26 PROCEDURE — 86140 C-REACTIVE PROTEIN: CPT | Performed by: FAMILY MEDICINE

## 2021-04-26 RX ORDER — ONDANSETRON 4 MG/1
TABLET, FILM COATED ORAL
COMMUNITY
Start: 2021-04-13 | End: 2021-04-29

## 2021-04-26 ASSESSMENT — PAIN SCALES - GENERAL: PAINLEVEL: NO PAIN (1)

## 2021-04-26 NOTE — PATIENT INSTRUCTIONS
Labs today reviewed - copy given.  Repeat in a couple weeks to reassess.  Note to Dr Swift regarding need for antibiotics or repeat CT.  Follow up for US to evaluate ovary as scheduled this week.

## 2021-04-26 NOTE — NURSING NOTE
"Chief Complaint   Patient presents with     ER F/U     abdominal pain         Initial /82 (BP Location: Right arm, Patient Position: Chair, Cuff Size: Adult Regular)   Pulse 76   Temp 97.7  F (36.5  C) (Tympanic)   Resp 16   Wt 74.8 kg (165 lb)   LMP 04/11/2016 (LMP Unknown)   SpO2 99%   BMI 28.32 kg/m   Estimated body mass index is 28.32 kg/m  as calculated from the following:    Height as of 4/20/21: 1.626 m (5' 4\").    Weight as of this encounter: 74.8 kg (165 lb).  Medication Reconciliation: complete  Rupinder Dean LPN  "

## 2021-04-26 NOTE — Clinical Note
Follow up questions.  Saw you 4/20 for diverticulitis/colitis recheck.  Doing well.  Next day return of some discomfort, especially with stool.  WBC up mildly again 12, ANC 8, but CRP normal.  Prior C diff - so she was reluctant to do Augmentin.  So she did flagyl/cipro.  Resistance?  Need to repeat antibiotics or CT?  Thanks!

## 2021-04-26 NOTE — PROGRESS NOTES
Assessment & Plan     Sigmoid diverticulitis  Clinically better.  Non-tender on exam today.  However, labs with mixed picture.  WBC back up a bit.  CRP completely normal.  Will reach out to Dr Swift regarding any need for additional antibiotics or repeat CT vs monitoring clinically and rechecking labs in a couple weeks.  Future labs ordered.  Sitz baths and barrier cream to buttock.  - CBC with platelets and differential  - CRP, inflammation  - CBC with platelets and differential; Future  - CRP, inflammation; Future    Left tubo-ovarian mass  Possible.  US scheduled for 2 days from now.   Depending on results - need to clarify with radiology.  Body of report states 11 cm cyst - and present on prior CT - but not documented to that size on prior CT.  Typo??   - CBC with platelets and differential; Future  - CRP, inflammation; Future    RLQ abdominal pain  As above.  - CBC with platelets and differential  - CRP, inflammation  956}     See Patient Instructions       Jenelle Rivera MD  [facility] - JOHANA Martin is a 51 year old who presents for the following health issues     HPI     Abdominal Pain - ER 4/11, Dr Albright 4/13, Dr Swift 4/20.  Symptoms increased next day - 4/21/2021.    Onset/Duration: 4/10/21  Description:   Character: Sharp and Fullness  Location: right lower quadrant  Radiation: None, Back and Pelvic region rightside  Intensity: moderate  Progression of Symptoms:  worsening and intermittent  Accompanying Signs & Symptoms:  Fever/Chills: no  Gas/Bloating: YES  Nausea: no   Vomitting: no  Diarrhea: no  Constipation: YES  Dysuria or Hematuria: no  History:   Trauma: no  Previous similar pain: yes- when she had ovarian cysts  Previous tests done: CT  Precipitating factors:   Does the pain change with:     Food: no    Bowel Movement: YES    Urination: no   Other factors:  no  Therapies tried and outcome: more fiber    Patient's last menstrual period was 04/11/2016 (lmp  unknown).    CT 4/11/2021 - sigmoid colitis - maybe related to diverticulitis.  Possible left ovarian cyst.  US scheduled 2 days from now.  WBC 18.8 4/11, repeat 4/13 was normal 9.7.  CRP 4/13 was 77.    Treated with Augmentin x 7 days.  Got script home - was what patient was on and had C diff.  Declined taking it.   Then  Changed Flagyl and Cipro.    Colonoscopy 2018 - [facility] - tubular adenoma - repeat 5 years.    Dr Swift advised repeat scope only if things not improving.    Pain with bowel movement, as it is exiting.  Faint blood with wiping.  Known hemorrhoid.  Sharp pain on exact.  Abdominal pain is intermittent.    BM 3 times per day.    Prior Diverticulitis x 1 - when PCP was Dr Bermudez.  Possibly 2 times.    Review of Systems   Constitutional, HEENT, cardiovascular, pulmonary, gi and gu systems are negative, except as otherwise noted.      Objective    /82 (BP Location: Right arm, Patient Position: Chair, Cuff Size: Adult Regular)   Pulse 76   Temp 97.7  F (36.5  C) (Tympanic)   Resp 16   Wt 74.8 kg (165 lb)   LMP 04/11/2016 (LMP Unknown)   SpO2 99%   BMI 28.32 kg/m    Body mass index is 28.32 kg/m .  Physical Exam   GENERAL: healthy, alert and no distress  NECK: no adenopathy, no asymmetry, masses, or scars and thyroid normal to palpation  RESP: lungs clear to auscultation - no rales, rhonchi or wheezes  CV: regular rate and rhythm, normal S1 S2, no S3 or S4, no murmur, click or rub, no peripheral edema and peripheral pulses strong  ABDOMEN: soft, nontender, no hepatosplenomegaly, no masses and bowel sounds normal  RECTAL (female): no rectal masses; just caudal to anus the cleft has mild skin breakdown - almost fissure  MS: no gross musculoskeletal defects noted, no edema  PSYCH: mentation appears normal, affect normal/bright    Results for orders placed or performed in visit on 04/26/21 (from the past 24 hour(s))   CBC with platelets and differential   Result Value Ref Range    WBC 12.4 (H)  4.0 - 11.0 10e9/L    RBC Count 4.64 3.8 - 5.2 10e12/L    Hemoglobin 13.6 11.7 - 15.7 g/dL    Hematocrit 42.3 35.0 - 47.0 %    MCV 91 78 - 100 fl    MCH 29.3 26.5 - 33.0 pg    MCHC 32.2 31.5 - 36.5 g/dL    RDW 12.9 10.0 - 15.0 %    Platelet Count 444 150 - 450 10e9/L    Diff Method Automated Method     % Neutrophils 71.1 %    % Lymphocytes 18.7 %    % Monocytes 8.4 %    % Eosinophils 0.5 %    % Basophils 0.5 %    % Immature Granulocytes 0.8 %    Nucleated RBCs 0 0 /100    Absolute Neutrophil 8.8 (H) 1.6 - 8.3 10e9/L    Absolute Lymphocytes 2.3 0.8 - 5.3 10e9/L    Absolute Monocytes 1.1 0.0 - 1.3 10e9/L    Absolute Eosinophils 0.1 0.0 - 0.7 10e9/L    Absolute Basophils 0.1 0.0 - 0.2 10e9/L    Abs Immature Granulocytes 0.1 0 - 0.4 10e9/L    Absolute Nucleated RBC 0.0    CRP, inflammation   Result Value Ref Range    CRP Inflammation 3.2 0.0 - 8.0 mg/L

## 2021-04-28 ENCOUNTER — MYC MEDICAL ADVICE (OUTPATIENT)
Dept: SURGERY | Facility: OTHER | Age: 51
End: 2021-04-28

## 2021-04-28 ENCOUNTER — HOSPITAL ENCOUNTER (OUTPATIENT)
Dept: ULTRASOUND IMAGING | Facility: HOSPITAL | Age: 51
Discharge: HOME | End: 2021-04-28
Attending: FAMILY MEDICINE | Admitting: FAMILY MEDICINE
Payer: COMMERCIAL

## 2021-04-28 DIAGNOSIS — N83.201 CYSTS OF BOTH OVARIES: ICD-10-CM

## 2021-04-28 DIAGNOSIS — N83.202 CYSTS OF BOTH OVARIES: ICD-10-CM

## 2021-04-28 PROCEDURE — 76830 TRANSVAGINAL US NON-OB: CPT

## 2021-04-28 RX ORDER — METRONIDAZOLE 500 MG/1
500 TABLET ORAL 3 TIMES DAILY
Qty: 21 TABLET | Refills: 0 | Status: SHIPPED | OUTPATIENT
Start: 2021-04-28 | End: 2021-05-05

## 2021-04-28 RX ORDER — CIPROFLOXACIN 500 MG/1
500 TABLET, FILM COATED ORAL 2 TIMES DAILY
Qty: 14 TABLET | Refills: 0 | Status: SHIPPED | OUTPATIENT
Start: 2021-04-28 | End: 2021-05-05

## 2021-04-29 ENCOUNTER — OFFICE VISIT (OUTPATIENT)
Dept: OBGYN | Facility: OTHER | Age: 51
End: 2021-04-29
Attending: FAMILY MEDICINE
Payer: COMMERCIAL

## 2021-04-29 VITALS
DIASTOLIC BLOOD PRESSURE: 70 MMHG | BODY MASS INDEX: 28.17 KG/M2 | HEIGHT: 64 IN | OXYGEN SATURATION: 98 % | HEART RATE: 78 BPM | WEIGHT: 165 LBS | SYSTOLIC BLOOD PRESSURE: 121 MMHG

## 2021-04-29 DIAGNOSIS — N83.202 BILATERAL OVARIAN CYSTS: ICD-10-CM

## 2021-04-29 DIAGNOSIS — N83.201 BILATERAL OVARIAN CYSTS: ICD-10-CM

## 2021-04-29 DIAGNOSIS — B37.2 YEAST INFECTION OF THE SKIN: Primary | ICD-10-CM

## 2021-04-29 PROCEDURE — 99203 OFFICE O/P NEW LOW 30 MIN: CPT | Performed by: OBSTETRICS & GYNECOLOGY

## 2021-04-29 RX ORDER — FLUCONAZOLE 150 MG/1
150 TABLET ORAL ONCE
Qty: 1 TABLET | Refills: 0 | Status: SHIPPED | OUTPATIENT
Start: 2021-04-29 | End: 2021-04-29

## 2021-04-29 ASSESSMENT — PAIN SCALES - GENERAL: PAINLEVEL: NO PAIN (0)

## 2021-04-29 ASSESSMENT — MIFFLIN-ST. JEOR: SCORE: 1338.44

## 2021-04-29 NOTE — PROGRESS NOTES
CC:  Consult from Dr. Ruiz for bilateral ovarian cysts.   HPI:  Veronica Ansari is a 51 year old female is a   P2 (CS)   Menopause was at age 48.  Pt recently diagnosed with diverticulitis and CT scan noted bilateral small ovarian cysts.   F/u US showed 2.8 cm mostly simple right ovarian cyst, left not visualized.   Patients records are available and reviewed at today's visit.    Past GYN history: Menorrhagia, Ablation 2016.  Distant h/o ruptured ovarian cysts.   Pelvic pain: No (+ abdominal discomfort/diverticulitis)  Abnormal Discharge: No  S/p yeast infection after abx for diverticulitis.   Previous work-up: No  Abnormal pap: No  Vaginitis symptoms;  No  HRT:  No         Last PAP smear:  Normal      Past Medical History:   Diagnosis Date     Cervicalgia      Deviated nasal septum      Embolism and thrombosis (H)     unspecified site     Fibrocystic breast disease      Migraine      Prothrombin mutation (H)      Sinusitis        Past Surgical History:   Procedure Laterality Date     ABDOMINOPLASTY       C BREAST AUGMENTATION       C LAP CHOLECYSTOSOMY        SECTION      x2     COLONOSCOPY - HIM SCAN  2011    Colonoscopy 2011     COLONOSCOPY - HIM SCAN  2018    Procedure:  Colonoscopy diagnostic with polypectomy and biopsies;  Surgeon:  Danelle Swift MD;  Location:  [facility] OR     COLONOSCOPY - HIM SCAN  2007    Colonoscopy, flex, diagnostic     DILATE CERVIX, HYSTEROSCOPY, ABLATE ENDOMETRIUM, COMBINED N/A 2016    Procedure: COMBINED DILATE CERVIX, HYSTEROSCOPY, ABLATE ENDOMETRIUM;  Surgeon: Pacheco Curry MD;  Location: [facility] OR     ESOPHAGOGASTRODUODENOSCOPY      negative     ESOPHAGOSCOPY, GASTROSCOPY, DUODENOSCOPY (EGD), COMBINED N/A 2020    Procedure: Upper Endoscopy with biopsy;  Surgeon: Son Cooper MD;  Location: [facility] OR       No family history on file.    Allergies: Augmentin, Doxycycline, Flagyl [metronidazole], Food, and Sulfamethoxazole  "w/trimethoprim    Current Outpatient Medications   Medication Sig Dispense Refill     ciprofloxacin (CIPRO) 500 MG tablet Take 1 tablet (500 mg) by mouth 2 times daily for 7 days 14 tablet 0     fluconazole (DIFLUCAN) 150 MG tablet Take 1 tablet (150 mg) by mouth once for 1 dose 1 tablet 0     fluticasone (FLONASE) 50 MCG/ACT nasal spray SHAKE LIQUID AND USE 2 SPRAYS IN EACH NOSTRIL DAILY 16 g 3     metroNIDAZOLE (FLAGYL) 500 MG tablet Take 1 tablet (500 mg) by mouth 3 times daily for 7 days 21 tablet 0     omeprazole (PRILOSEC) 40 MG DR capsule TAKE 1 CAPSULE(40 MG) BY MOUTH DAILY 90 capsule 1       ROS:  CONSTITUTIONAL: NEGATIVE for fever, chills, change in weight  RESP: NEGATIVE for significant cough or SOB  CV: NEGATIVE for chest pain, palpitations or peripheral edema  GI: NEGATIVE for nausea, abdominal pain, heartburn, or change in bowel habits  : NEGATIVE for frequency, dysuria, hematuria, vaginal discharge  ENDOCRINE: NEGATIVE for temperature intolerance, skin/hair changes, breast discharge  PSYCHIATRIC: NEGATIVE for changes in mood or affect    EXAM:  Blood pressure 121/70, pulse 78, height 1.626 m (5' 4\"), weight 74.8 kg (165 lb), last menstrual period 04/11/2016, SpO2 98 %.   BMI= Body mass index is 28.32 kg/m .  General - pleasant female in no acute distress.  Rectovaginal - deferred.  Musculoskeletal - no gross deformities.  Neurological - normal strength, sensation, and mental status.       PROCEDURE: US PELVIC COMPLETE WITH TRANSVAGINAL   HISTORY: Cysts of both ovaries; Cysts of both ovaries   TECHNIQUE: Transabdominal and transvaginal ultrasound of the pelvis.   COMPARISON: none   FINDINGS:      MEASUREMENTS:  Uterus: 8.1 x 4.7 x 5.1 cm (Length x Height x Width)  Endometrium: 6 mm in thickness  Right Ovary: 3 x 1.8 x 2.5 cm. (Length x Height x Width)  Left Ovary:  Not visualized (Length x Height x Width)     UTERUS: Nabothian cysts are seen in the cervix. The endometrium  appears normal.     ADNEXA: " There is a 2.8 x 2.5 x 2.5 cm in diameter predominantly cystic  mass in the right ovary. The left ovary was not seen with certainty.  Arterial venous flow is identified in the right ovary.     MISC: Free fluid is seen in the cul-de-sac     IMPRESSION:    2.8 cm diameter, cystic mass in the right ovary. Some internal  septations are noted.   Nabothian cysts seen in the cervix.   Free fluid in the pelvic cul-de-sac   Nonvisualization of the left ovary        ASSESSMENT/PLAN:  (B37.2) Yeast infection of the skin  (primary encounter diagnosis)  Comment: from antibiotics  Plan: fluconazole (DIFLUCAN) 150 MG tablet            (N83.201,  N83.202) Bilateral ovarian cysts  Comment: and cervical nabothian cysts  Plan: US Pelvic Complete with Transvaginal         Given less than 3cm with  simple appearance likely benign.  Recommend f/u US in 3 months to confirm stability/resolution.  Pt agrees with POC.    Pt has my card and phone number to call as needed if problems in the interim or she does not hear her results.           Pacheco Curry MD

## 2021-04-29 NOTE — NURSING NOTE
"Chief Complaint   Patient presents with     Consult     Consult/ Ruiz/ ovarian cyst       Initial /70 (BP Location: Left arm, Cuff Size: Adult Regular)   Pulse 78   Ht 1.626 m (5' 4\")   Wt 74.8 kg (165 lb)   LMP 04/11/2016 (LMP Unknown)   SpO2 98%   BMI 28.32 kg/m   Estimated body mass index is 28.32 kg/m  as calculated from the following:    Height as of this encounter: 1.626 m (5' 4\").    Weight as of this encounter: 74.8 kg (165 lb).  Medication Reconciliation: complete  Jewels Overton LPN  "

## 2021-05-05 ENCOUNTER — PREP FOR PROCEDURE (OUTPATIENT)
Dept: SURGERY | Facility: OTHER | Age: 51
End: 2021-05-05

## 2021-05-05 ENCOUNTER — HOSPITAL ENCOUNTER (OUTPATIENT)
Facility: HOSPITAL | Age: 51
End: 2021-05-05
Attending: SURGERY | Admitting: SURGERY
Payer: COMMERCIAL

## 2021-05-05 ENCOUNTER — OFFICE VISIT (OUTPATIENT)
Dept: SURGERY | Facility: OTHER | Age: 51
End: 2021-05-05
Attending: SURGERY
Payer: COMMERCIAL

## 2021-05-05 VITALS
DIASTOLIC BLOOD PRESSURE: 84 MMHG | TEMPERATURE: 97.9 F | OXYGEN SATURATION: 99 % | BODY MASS INDEX: 28.32 KG/M2 | SYSTOLIC BLOOD PRESSURE: 116 MMHG | WEIGHT: 165 LBS | HEART RATE: 75 BPM

## 2021-05-05 DIAGNOSIS — K57.32 DIVERTICULITIS OF COLON: Primary | ICD-10-CM

## 2021-05-05 DIAGNOSIS — K57.92 DIVERTICULITIS: ICD-10-CM

## 2021-05-05 DIAGNOSIS — K57.92 DIVERTICULITIS: Primary | ICD-10-CM

## 2021-05-05 DIAGNOSIS — Z01.818 PREOP TESTING: ICD-10-CM

## 2021-05-05 PROCEDURE — 99214 OFFICE O/P EST MOD 30 MIN: CPT | Performed by: SURGERY

## 2021-05-05 RX ORDER — SACCHAROMYCES BOULARDII 250 MG
250 CAPSULE ORAL 2 TIMES DAILY
Qty: 20 CAPSULE | Refills: 0 | Status: SHIPPED | OUTPATIENT
Start: 2021-05-05 | End: 2021-11-18

## 2021-05-05 ASSESSMENT — PAIN SCALES - GENERAL: PAINLEVEL: NO PAIN (0)

## 2021-05-05 NOTE — PROGRESS NOTES
CLINIC NOTE - FOLLOW UP  5/5/2021    Patient:Veronica Ansari    Reason for Visit: Follow up from prior clinic visit for diverticulitis    This is a 51 year old female here for follow up from a prior clinic visit for diverticulitis. Her prior medical records were reviewed.       She did finish her course of antibiotics.  Was doing well.  Started to have symptoms again and was restarted on antibiotics.  Has been on them for approximately 5 days.  She is feeling relatively normal.  Her stools are somewhat looser than normal.  She does have a history of C. difficile after antibiotics.    Current Medications:  Current Outpatient Medications   Medication Sig Dispense Refill     ciprofloxacin (CIPRO) 500 MG tablet Take 1 tablet (500 mg) by mouth 2 times daily for 7 days 14 tablet 0     fluticasone (FLONASE) 50 MCG/ACT nasal spray SHAKE LIQUID AND USE 2 SPRAYS IN EACH NOSTRIL DAILY 16 g 3     metroNIDAZOLE (FLAGYL) 500 MG tablet Take 1 tablet (500 mg) by mouth 3 times daily for 7 days 21 tablet 0     omeprazole (PRILOSEC) 40 MG DR capsule TAKE 1 CAPSULE(40 MG) BY MOUTH DAILY 90 capsule 1     saccharomyces boulardii (FLORASTOR) 250 MG capsule Take 1 capsule (250 mg) by mouth 2 times daily 20 capsule 0       Allergies:  Allergies   Allergen Reactions     Augmentin Diarrhea     Doxycycline Unknown     Flagyl [Metronidazole] Other (See Comments)     Food      Mangoes cause rash     Sulfamethoxazole W/Trimethoprim Rash     PHYSICAL EXAM:     Vital signs: /84 (BP Location: Left arm, Patient Position: Sitting, Cuff Size: Adult Regular)   Pulse 75   Temp 97.9  F (36.6  C) (Tympanic)   Wt 74.8 kg (165 lb)   LMP 04/11/2016 (LMP Unknown)   SpO2 99%   BMI 28.32 kg/m     Weight: [unfilled]   BMI: Body mass index is 28.32 kg/m .   General: Normal, healthy, cooperative, in no acute distress   Skin: no jaundice   HEENT: PERRLA and EOMI   Neck: supple   Lungs: clear to auscultation   CV: Regular rate and rhythm without  murmer   Abdominal: abdomen is soft without significant tenderness, masses, organomegaly or guarding   Extremities: No cyanosis, clubbing or edema noted bilaterally in Upper and Lower Extremities   Neurological: without deficit    ASSESSMENT:  51 year old female here as follow up from a prior clinic visit for diverticulitis.    PLAN: We will go ahead and continue her on her antibiotics.  We will follow her up next week and see how she is doing.  Go ahead and start her on probiotics also.  As long as she continues to improve we will stop the antibiotics after her course this time.  She requires more antibiotics because of recurrent symptoms we will admit her for IV dosing.    We will also schedule her for colonoscopy in approximately 2 weeks to make sure that this is consistent with diverticulitis and not other causes.    The risks, benefits, and alternatives to the planned procedure were fully discussed with the patient and/or the patient's representative(s). The risks of bleeding, infection, death, missing pathology, the need for additional procedures intra-operatively, the possible need for intra-operative consults, the possible need for transfusion therapy, cardiopulmonary compromise, the possible need for additional surgery for a complication were discussed with the patient and/or the patient's representative(s). The patient's and/or patient's representative(s) questions were addressed and answered. Informed consent was obtained from the patient and/or the patient's representative(s). The patient and/or the patient's representative(s) consent to proceed.

## 2021-05-05 NOTE — PATIENT INSTRUCTIONS
We want to your Colonoscopy to be as pleasant as possible. Please review the instructions below. If you have questions, you may contact us at the any of the following numbers:   [facility] Health Unit Coordinator: [phone]  Clinic Nurse (Lorie): [phone]  Surgery Education Nurse: [phone]      Date of procedure: 5/20/21 with Dr. Swift  Admit Time: Hospital Surgery will call you the day before your procedure by 5pm with your arrival time. If your surgery is on Monday, expect a call on Friday.  If you are not contacted before 5PM, please call admitting at [phone].   After hours or on weekends, please call [phone] to postpone.   Call the clinic nurse if you become ill within 1 week of your procedure to reschedule.     COVID-19 test is needed 4 days before procedure in the morning. This testing is done in the [facility] on the West side of [facility] (weekdays and weekends) or at the [facility] through door #3 (weekdays only).  Follow the signage for parking and bring your mobile phone (if you have one) to call the phone number ([phone]) on the sign outside the testing site for check-in. Stay in your vehicle until you are directed to enter. If you do not have a cell phone, please call the nurse for instructions on checking in.   This has been scheduled for 5/16/21 at 11:00 at the [place] site.      Please  the following over the counter items for your bowel prep:    Two 5 mg Dulcolax (bisacodyl) tablets   One 8.3 ounce (238 g) bottle of Miralax   One 10 ounce bottle of liquid Magnesium Citrate-not capsules.   One 64 ounce bottle of Gatorade-Not red, purple, or powdered.   (you will need additional sports drink for the 2 days before colonoscopy)    7 DAYS BEFORE THE EXAM:     Call the Surgery Education Nurse at [phone] and have a medication list ready.   Stop Aspirin or NSAIDS (Ibuprofen, Celebrex, Naproxen, etc) 7 days before surgery.  Stop fiber  "supplements, herbals, vitamins, and iron. Stop eating corn, nuts and seeds.  If you are prescribed a daily 81mg Aspirin, you may continue this.  If you are prescribed blood thinners or insulin, talk to your primary provider for instructions.    2 DAYS BEFORE THE EXAM:     Low fiber diet.   See list of low fiber foods on page 3 of the \"Miralax, Dulcolax and Magnesium Citrate\" packet.   Drink at least 4-6 large glasses of sports drink today and tomorrow (separate from the bowel prep).  Avoid red and purple.    1 DAY BEFORE THE EXAM:     No solid food/milk products after 12:00 AM (midnight).   Drink only clear liquids all day, at least 8-10 glasses, including 4-6 glasses of sports drink.   Please see list of clear liquids on page 2 of \"Miralax, Dulcolax and Magnesium Citrate\" packet.    Avoid anything red or purple. No alcohol.            AT 12:00 PM NOON THE DAY BEFORE EXAM:  Take 2 Dulcolax tablets by mouth with clear liquids.            AT 6:00 PM THE DAY BEFORE EXAM:  Mix the bottle of Miralax and 64 oz. of Gatorade in a pitcher.   Drink one 8 oz. glass every 10-15 minutes until gone. Stay near a toilet.     DAY OF COLONOSCOPY:               6 HOURS PRIOR TO EXAM ON DAY OF PROCEDURE:    Drink the bottle of Magnesium Citrate followed by a full glass of water.   You may have clear liquids up until 2 hours before arrival, then nothing by mouth.  If you need to take any medications after this, take them with a tiny sip of water.   If you have asthma, bring your inhaler with you.  Shower before arrival and wear clean, comfortable clothes.   No jewelry, make-up, nail polish, hair spray, lotions, or perfumes.   [place] in Admitting through the [place] Entrance.   You must have a responsible adult to drive and to stay with you for 4 hours at home.         TIPS FOR COLON CLEANSING BEFORE YOUR COLONOSCOPY  To get accurate results from your exam, your colon must be completely empty or you may need to repeat the colon prep and " exam. If you followed instructions and your stool is clear or yellow liquid, you are ready. If you are not sure if your colon is clean, please call the clinic nurse.    You may use Tucks wipes, hemorrhoid treatments, hydrocortisone cream or alcohol-free baby wipes to ease anal irritation. You may also use Vaseline to help protect the skin.     Quickly drink each glass. Even when you are sitting on the toilet, keep drinking every 15 minutes. If you have nausea or vomiting, take a break for 30 minutes and then resume drinking.    You will have loose watery stools and may also have chills. Dress for comfort. Expect to feel bloating, nausea and other discomfort until the stool clears from your colon.

## 2021-05-05 NOTE — NURSING NOTE
"Chief Complaint   Patient presents with     Consult     Discuss options for Diverticulitis       Initial /84 (BP Location: Left arm, Patient Position: Sitting, Cuff Size: Adult Regular)   Pulse 75   Temp 97.9  F (36.6  C) (Tympanic)   Wt 74.8 kg (165 lb)   LMP 04/11/2016 (LMP Unknown)   SpO2 99%   BMI 28.32 kg/m   Estimated body mass index is 28.32 kg/m  as calculated from the following:    Height as of 4/29/21: 1.626 m (5' 4\").    Weight as of this encounter: 74.8 kg (165 lb).  Medication Reconciliation: complete  Tammy Kendall LPN  "

## 2021-05-12 ENCOUNTER — OFFICE VISIT (OUTPATIENT)
Dept: SURGERY | Facility: OTHER | Age: 51
End: 2021-05-12
Attending: SURGERY
Payer: COMMERCIAL

## 2021-05-12 VITALS
OXYGEN SATURATION: 98 % | HEART RATE: 76 BPM | HEIGHT: 64 IN | TEMPERATURE: 98.6 F | WEIGHT: 162 LBS | DIASTOLIC BLOOD PRESSURE: 76 MMHG | BODY MASS INDEX: 27.66 KG/M2 | SYSTOLIC BLOOD PRESSURE: 127 MMHG

## 2021-05-12 DIAGNOSIS — K57.92 DIVERTICULITIS: Primary | ICD-10-CM

## 2021-05-12 PROCEDURE — 99213 OFFICE O/P EST LOW 20 MIN: CPT | Performed by: SURGERY

## 2021-05-12 ASSESSMENT — MIFFLIN-ST. JEOR: SCORE: 1324.83

## 2021-05-12 ASSESSMENT — PAIN SCALES - GENERAL: PAINLEVEL: NO PAIN (0)

## 2021-05-12 NOTE — PATIENT INSTRUCTIONS
Thank you for allowing Dr. Swift and our surgical team to participate in your care. Please call our health unit coordinator at [phone] with scheduling questions or the nurse at [phone] with any other questions or concerns.

## 2021-05-12 NOTE — PROGRESS NOTES
"CLINIC NOTE - FOLLOW UP  5/12/2021    Patient:Veronica Ansari    Reason for Visit: Follow up from prior clinic visit for persistent diverticulitis    This is a 51 year old female here for follow up from a prior clinic visit for persistent diverticulitis. Her prior medical records were reviewed.       She states that she is feeling better.  No fever.  No chills.  Her bowel movements are returning to normal since being off antibiotics.  Some mild crampy abdominal pain but otherwise no abdominal pain.  She is scheduled for colonoscopy next week.    Current Medications:  Current Outpatient Medications   Medication Sig Dispense Refill     fluticasone (FLONASE) 50 MCG/ACT nasal spray SHAKE LIQUID AND USE 2 SPRAYS IN EACH NOSTRIL DAILY 16 g 3     omeprazole (PRILOSEC) 40 MG DR capsule TAKE 1 CAPSULE(40 MG) BY MOUTH DAILY 90 capsule 1     saccharomyces boulardii (FLORASTOR) 250 MG capsule Take 1 capsule (250 mg) by mouth 2 times daily 20 capsule 0       Allergies:  Allergies   Allergen Reactions     Augmentin Diarrhea     Doxycycline Unknown     Flagyl [Metronidazole] Other (See Comments)     Food      [place] cause rash     Sulfamethoxazole W/Trimethoprim Rash       ROS:  Pertinent items are noted in HPI.  All other systems are negative.    PHYSICAL EXAM:     Vital signs: /76 (BP Location: Right arm, Cuff Size: Adult Regular)   Pulse 76   Temp 98.6  F (37  C) (Tympanic)   Ht 1.626 m (5' 4\")   Wt 73.5 kg (162 lb)   LMP 04/11/2016 (LMP Unknown)   SpO2 98%   BMI 27.81 kg/m     Weight: [unfilled]   BMI: Body mass index is 27.81 kg/m .   General: Normal, healthy, cooperative, in no acute distress   Skin: no jaundice   HEENT: PERRLA and EOMI   Neck: supple     ASSESSMENT:  51 year old female here as follow up from a prior clinic visit for persistent diverticulitis. Improving clinically.    PLAN: We will see how she does off of antibiotics.  As long as she remains asymptomatic we will plan on doing a colonoscopy next " week.  If she starts to get symptoms again we will have her present to the emergency room get a CT of the abdomen pelvis.  And then probably admit her for IV antibiotic therapy.  Comfortable with the plan.  We will follow-up as arranged.

## 2021-05-12 NOTE — NURSING NOTE
"Chief Complaint   Patient presents with     Follow Up     recurrent diverticulitis       Initial /76 (BP Location: Right arm, Cuff Size: Adult Regular)   Pulse 76   Temp 98.6  F (37  C) (Tympanic)   Ht 1.626 m (5' 4\")   Wt 73.5 kg (162 lb)   LMP 04/11/2016 (LMP Unknown)   SpO2 98%   BMI 27.81 kg/m   Estimated body mass index is 27.81 kg/m  as calculated from the following:    Height as of this encounter: 1.626 m (5' 4\").    Weight as of this encounter: 73.5 kg (162 lb).  Medication Reconciliation: complete  Jewels Overton LPN  "

## 2021-05-13 ENCOUNTER — ANESTHESIA EVENT (OUTPATIENT)
Dept: SURGERY | Facility: HOSPITAL | Age: 51
End: 2021-05-13

## 2021-05-13 RX ORDER — HYDROMORPHONE HYDROCHLORIDE 1 MG/ML
.3-.5 INJECTION, SOLUTION INTRAMUSCULAR; INTRAVENOUS; SUBCUTANEOUS EVERY 10 MIN PRN
Status: CANCELLED | OUTPATIENT
Start: 2021-05-13

## 2021-05-13 RX ORDER — MEPERIDINE HYDROCHLORIDE 25 MG/ML
12.5 INJECTION INTRAMUSCULAR; INTRAVENOUS; SUBCUTANEOUS
Status: CANCELLED | OUTPATIENT
Start: 2021-05-13

## 2021-05-13 RX ORDER — NALOXONE HYDROCHLORIDE 0.4 MG/ML
0.2 INJECTION, SOLUTION INTRAMUSCULAR; INTRAVENOUS; SUBCUTANEOUS
Status: CANCELLED | OUTPATIENT
Start: 2021-05-13 | End: 2021-05-14

## 2021-05-13 RX ORDER — LABETALOL 20 MG/4 ML (5 MG/ML) INTRAVENOUS SYRINGE
10
Status: CANCELLED | OUTPATIENT
Start: 2021-05-13

## 2021-05-13 RX ORDER — SODIUM CHLORIDE, SODIUM LACTATE, POTASSIUM CHLORIDE, CALCIUM CHLORIDE 600; 310; 30; 20 MG/100ML; MG/100ML; MG/100ML; MG/100ML
INJECTION, SOLUTION INTRAVENOUS CONTINUOUS
Status: CANCELLED | OUTPATIENT
Start: 2021-05-13

## 2021-05-13 RX ORDER — LIDOCAINE 40 MG/G
CREAM TOPICAL
Status: CANCELLED | OUTPATIENT
Start: 2021-05-13

## 2021-05-13 RX ORDER — NALOXONE HYDROCHLORIDE 0.4 MG/ML
0.4 INJECTION, SOLUTION INTRAMUSCULAR; INTRAVENOUS; SUBCUTANEOUS
Status: CANCELLED | OUTPATIENT
Start: 2021-05-13 | End: 2021-05-14

## 2021-05-13 RX ORDER — ONDANSETRON 2 MG/ML
4 INJECTION INTRAMUSCULAR; INTRAVENOUS EVERY 30 MIN PRN
Status: CANCELLED | OUTPATIENT
Start: 2021-05-13

## 2021-05-13 RX ORDER — ALBUTEROL SULFATE 0.83 MG/ML
2.5 SOLUTION RESPIRATORY (INHALATION) EVERY 4 HOURS PRN
Status: CANCELLED | OUTPATIENT
Start: 2021-05-13

## 2021-05-13 RX ORDER — ONDANSETRON 4 MG/1
4 TABLET, ORALLY DISINTEGRATING ORAL EVERY 30 MIN PRN
Status: CANCELLED | OUTPATIENT
Start: 2021-05-13

## 2021-05-13 RX ORDER — FENTANYL CITRATE 50 UG/ML
25-50 INJECTION, SOLUTION INTRAMUSCULAR; INTRAVENOUS EVERY 5 MIN PRN
Status: CANCELLED | OUTPATIENT
Start: 2021-05-13

## 2021-05-13 ASSESSMENT — LIFESTYLE VARIABLES: TOBACCO_USE: 1

## 2021-05-14 ENCOUNTER — TELEPHONE (OUTPATIENT)
Dept: SURGERY | Facility: OTHER | Age: 51
End: 2021-05-14

## 2021-05-14 ENCOUNTER — TRANSFERRED RECORDS (OUTPATIENT)
Dept: HEALTH INFORMATION MANAGEMENT | Facility: CLINIC | Age: 51
End: 2021-05-14

## 2021-05-14 DIAGNOSIS — K57.92 DIVERTICULITIS: Primary | ICD-10-CM

## 2021-05-14 LAB
CREAT SERPL-MCNC: 0.63 MG/DL (ref 0.4–1)
GFR SERPL CREATININE-BSD FRML MDRD: >60 ML/MIN/1.73M2
GLUCOSE SERPL-MCNC: 95 MG/DL (ref 70–99)
POTASSIUM SERPL-SCNC: 3.8 MEQ/L (ref 3.4–5.1)

## 2021-05-14 NOTE — TELEPHONE ENCOUNTER
Patient was seen in clinic on 5/12/21 by . Patient called this morning and decided that she did want to get a CT scan for diverticulitis. Orders were placed. Lorie Humphries LPN    
No

## 2021-05-16 ENCOUNTER — OFFICE VISIT (OUTPATIENT)
Dept: FAMILY MEDICINE | Facility: OTHER | Age: 51
End: 2021-05-16
Attending: FAMILY MEDICINE
Payer: COMMERCIAL

## 2021-05-16 DIAGNOSIS — Z01.818 PREOP TESTING: ICD-10-CM

## 2021-05-16 DIAGNOSIS — Z01.818 PRE-OP EXAM: Primary | ICD-10-CM

## 2021-05-16 LAB
LABORATORY COMMENT REPORT: ABNORMAL
SARS-COV-2 RNA RESP QL NAA+PROBE: NORMAL
SARS-COV-2 RNA RESP QL NAA+PROBE: POSITIVE
SPECIMEN SOURCE: ABNORMAL
SPECIMEN SOURCE: NORMAL

## 2021-05-16 PROCEDURE — U0005 INFEC AGEN DETEC AMPLI PROBE: HCPCS | Performed by: FAMILY MEDICINE

## 2021-05-16 PROCEDURE — U0003 INFECTIOUS AGENT DETECTION BY NUCLEIC ACID (DNA OR RNA); SEVERE ACUTE RESPIRATORY SYNDROME CORONAVIRUS 2 (SARS-COV-2) (CORONAVIRUS DISEASE [COVID-19]), AMPLIFIED PROBE TECHNIQUE, MAKING USE OF HIGH THROUGHPUT TECHNOLOGIES AS DESCRIBED BY CMS-2020-01-R: HCPCS | Performed by: FAMILY MEDICINE

## 2021-05-17 ENCOUNTER — TELEPHONE (OUTPATIENT)
Dept: SURGERY | Facility: OTHER | Age: 51
End: 2021-05-17

## 2021-05-17 ENCOUNTER — MYC MEDICAL ADVICE (OUTPATIENT)
Dept: OBGYN | Facility: OTHER | Age: 51
End: 2021-05-17

## 2021-05-17 ENCOUNTER — TELEPHONE (OUTPATIENT)
Dept: FAMILY MEDICINE | Facility: OTHER | Age: 51
End: 2021-05-17

## 2021-05-17 DIAGNOSIS — B37.31 YEAST INFECTION OF THE VAGINA: Primary | ICD-10-CM

## 2021-05-17 RX ORDER — FLUCONAZOLE 150 MG/1
150 TABLET ORAL ONCE
Qty: 1 TABLET | Refills: 1 | Status: SHIPPED | OUTPATIENT
Start: 2021-05-17 | End: 2021-05-17

## 2021-05-17 NOTE — TELEPHONE ENCOUNTER
Called patient to let her know that her covid test was positive. Patients colonoscopy was moved to 6/10/21 with Dr.Wendell Swift. Patient will not need a new covid test. Patient education and scheduling have been notified of date change. Patient denies needing new bowel prep instructions. Lorie Humphries LPN

## 2021-05-17 NOTE — TELEPHONE ENCOUNTER
"  -Coronavirus (COVID-19) Notification    Caller Name (Patient, parent, daughter/son, grandparent, etc)  Veronica Ansari    Reason for call  Notify of Positive Coronavirus (COVID-19) lab results, assess symptoms,  review  [facility] recommendations    Lab Result    Lab test:  2019-nCoV rRt-PCR or SARS-CoV-2 PCR    Oropharyngeal AND/OR nasopharyngeal swabs is POSITIVE for 2019-nCoV RNA/SARS-COV-2 PCR (COVID-19 virus)    RN Recommendations/Instructions per [facility] Coronavirus COVID-19 recommendations    Brief introduction script  Introduce self then review script:  \"I am calling on behalf of [employer].  We were notified that your Coronavirus test (COVID-19) for was POSITIVE for the virus.  I have some information to relay to you but first I wanted to mention that the [place] Dept of Health will be contacting you shortly [it's possible MD already called Patient] to talk to you more about how you are feeling and other people you have had contact with who might now also have the virus.  Also,  [facility] is Partnering with the [facility] for Covid-19 research, you may be contacted directly by research staff.\"    Assessment (Inquire about Patient's current symptoms)   Assessment   Current Symptoms at time of phone call: (if no symptoms, document No symptoms] None   Symptoms onset (if applicable) NA     If at time of call, Patients symptoms hare worsened, the Patient should contact 911 or have someone drive them to Emergency Dept promptly:      If Patient calling 911, inform 911 personal that you have tested positive for the Coronavirus (COVID-19).  Place mask on and await 911 to arrive.    If Emergency Dept, If possible, please have another adult drive you to the Emergency Dept but you need to wear mask when in contact with other people.      Monoclonal Antibody Administration    You may be eligible to receive a new treatment with a monoclonal antibody for preventing hospitalization in patients " "at high risk for complications from COVID-19.   This medication is still experimental and available on a limited basis; it is given through an IV and must be given at an infusion center. Please note that not all people who are eligible will receive the medication since it is in limited supply.     Are you interested in being considered for this medication?  No.   Does the patient fit the criteria: No    If patient qualifies based on above criteria:  \"You will be contacted if you are selected to receive this treatment in the next 1-2 business days.   This is time sensitive and if you are not selected in the next 1-2 business days, you will not receive the medication.  If you do not receive a call to schedule, you have not been selected.\"      Review information with Patient    Your result was positive. This means you have COVID-19 (coronavirus).  We have sent you a letter that reviews the information that I'll be reviewing with you now.    How can I protect others?    If you have symptoms: stay home and away from others (self-isolate) until:    You've had no fever--and no medicine that reduces fever--for 1 full day (24 hours). And       Your other symptoms have gotten better. For example, your cough or breathing has improved. And     At least 10 days have passed since your symptoms started. (If you've been told by a doctor that you have a weak immune system, wait 20 days.)     If you don't have symptoms: Stay home and away from others (self-isolate) until at least 10 days have passed since your first positive COVID-19 test. (Date test collected)    During this time:    Stay in your own room, including for meals. Use your own bathroom if you can.    Stay away from others in your home. No hugging, kissing or shaking hands. No visitors.     Don't go to work, school or anywhere else.     Clean  high touch  surfaces often (doorknobs, counters, handles, etc.). Use a household cleaning spray or wipes. You'll find a full " list on the EPA website at www.epa.gov/pesticide-registration/list-n-disinfectants-use-against-sars-cov-2.     Cover your mouth and nose with a mask, tissue or other face covering to avoid spreading germs.    Wash your hands and face often with soap and water.    Make a list of people you have been in close contact with recently, even if either of you wore a face covering.   ; Start your list from 2 days before you became ill or had a positive test.  ; Include anyone that was within 6 feet of you for a cumulative total of 15 minutes or more in 24 hours. (Example: if you sat next to Larry for 5 minutes in the morning and 10 minutes in the afternoon, then you were in close contact for 15 minutes total that day. Larry would be added to your list.)    A public health worker will call or text you. It is important that you answer. They will ask you questions about possible exposures to COVID-19, such as people you have been in direct contact with and places you have visited.    Tell the people on your list that you have COVID-19; they should stay away from others for 14 days starting from the last time they were in contact with you (unless you are told something different from a public health worker).     Caregivers in these groups are at risk for severe illness due to COVID-19:  o People 65 years and older  o People who live in a nursing home or long-term care facility  o People with chronic disease (lung, heart, cancer, diabetes, kidney, liver, immunologic)  o People who have a weakened immune system, including those who:  - Are in cancer treatment  - Take medicine that weakens the immune system, such as corticosteroids  - Had a bone marrow or organ transplant  - Have an immune deficiency  - Have poorly controlled HIV or AIDS  - Are obese (body mass index of 40 or higher)  - Smoke regularly    Caregivers should wear gloves while washing dishes, handling laundry and cleaning bedrooms and bathrooms.    Wash and dry laundry  with special caution. Don't shake dirty laundry, and use the warmest water setting you can.    If you have a weakened immune system, ask your doctor about other actions you should take.    For more tips, go to www.cdc.gov/coronavirus/2019-ncov/downloads/10Things.pdf.    You should not go back to work until you meet the guidelines above for ending your home isolation. You don't need to be retested for COVID-19 before going back to work--studies show that you won't spread the virus if it's been at least 10 days since your symptoms started (or 20 days, if you have a weak immune system).    Employers: This document serves as formal notice of your employee's medical guidelines for going back to work. They must meet the above guidelines before going back to work in person.    How can I take care of myself?    1. Get lots of rest. Drink extra fluids (unless a doctor has told you not to).    2. Take Tylenol (acetaminophen) for fever or pain. If you have liver or kidney problems, ask your family doctor if it's okay to take Tylenol.     Take either:     650 mg (two 325 mg pills) every 4 to 6 hours, or     1,000 mg (two 500 mg pills) every 8 hours as needed.     Note: Don't take more than 3,000 mg in one day. Acetaminophen is found in many medicines (both prescribed and over-the-counter medicines). Read all labels to be sure you don't take too much.    For children, check the Tylenol bottle for the right dose (based on their age or weight).    3. If you have other health problems (like cancer, heart failure, an organ transplant or severe kidney disease): Call your specialty clinic if you don't feel better in the next 2 days.    4. Know when to call 911: Emergency warning signs include:    Trouble breathing or shortness of breath    Pain or pressure in the chest that doesn't go away    Feeling confused like you haven't felt before, or not being able to wake up    Bluish-colored lips or face    5. Sign up for [facility]. We  know it's scary to hear that you have COVID-19. We want to track your symptoms to make sure you're okay over the next 2 weeks. Please look for an email from [employer]--this is a free, online program that we'll use to keep in touch. To sign up, follow the link in the email. Learn more at [email or web address].    Where can I get more information?    [facility]: [email or web address]    Coronavirus Basics: www.health.[email or web address].[place]./diseases/coronavirus/basics.html    What to Do If You're Sick: www.cdc.gov/coronavirus/2019-ncov/about/steps-when-sick.html    Ending Home Isolation: www.cdc.gov/coronavirus/2019-ncov/hcp/disposition-in-home-patients.html     Caring for Someone with COVID-19: www.cdc.gov/coronavirus/2019-ncov/if-you-are-sick/care-for-someone.html     [facility] clinical trials (COVID-19 research studies): clinicalaffairs.[facility]-clinical-trials     A Positive COVID-19 letter will be sent via [employer] or the mail. (Exception, no letters sent to Presurgerical/Preprocedure Patients)  Patient notified of results and states that the office has already contacted her to reschedule the procedure.  Ibis Cordova LPN

## 2021-05-20 ENCOUNTER — ANESTHESIA (OUTPATIENT)
Dept: SURGERY | Facility: HOSPITAL | Age: 51
End: 2021-05-20

## 2021-05-22 ENCOUNTER — HOSPITAL ENCOUNTER (EMERGENCY)
Facility: HOSPITAL | Age: 51
Discharge: HOME | End: 2021-05-22
Attending: NURSE PRACTITIONER | Admitting: NURSE PRACTITIONER
Payer: COMMERCIAL

## 2021-05-22 VITALS
SYSTOLIC BLOOD PRESSURE: 151 MMHG | TEMPERATURE: 99.2 F | HEART RATE: 80 BPM | OXYGEN SATURATION: 98 % | RESPIRATION RATE: 18 BRPM | DIASTOLIC BLOOD PRESSURE: 91 MMHG

## 2021-05-22 DIAGNOSIS — K57.92 DIVERTICULITIS: ICD-10-CM

## 2021-05-22 DIAGNOSIS — T50.905A MEDICATION REACTION, INITIAL ENCOUNTER: ICD-10-CM

## 2021-05-22 PROCEDURE — G0463 HOSPITAL OUTPT CLINIC VISIT: HCPCS

## 2021-05-22 PROCEDURE — 99213 OFFICE O/P EST LOW 20 MIN: CPT | Performed by: NURSE PRACTITIONER

## 2021-05-22 RX ORDER — SACCHAROMYCES BOULARDII 250 MG
250 CAPSULE ORAL 2 TIMES DAILY
Qty: 20 CAPSULE | Refills: 0 | Status: SHIPPED | OUTPATIENT
Start: 2021-05-22 | End: 2021-06-01

## 2021-05-22 RX ORDER — CIPROFLOXACIN 500 MG/1
TABLET, FILM COATED ORAL
COMMUNITY
Start: 2021-05-14 | End: 2021-05-24

## 2021-05-22 RX ORDER — METRONIDAZOLE 500 MG/1
500 TABLET ORAL
COMMUNITY
Start: 2021-05-14 | End: 2021-05-28

## 2021-05-22 ASSESSMENT — ENCOUNTER SYMPTOMS
VOMITING: 0
ACTIVITY CHANGE: 1
CHILLS: 0
DIZZINESS: 0
LIGHT-HEADEDNESS: 0
HEADACHES: 0
SHORTNESS OF BREATH: 0
NAUSEA: 0
FEVER: 0
DIARRHEA: 0

## 2021-05-22 NOTE — ED TRIAGE NOTES
Pt presents with an allergic reaction to ciprofloxicin that she was taking for diverticulitis. She feels tingling to both of patti feet up to her calves and crackling in her joints that started last week intermittently but now it is not going away.

## 2021-05-22 NOTE — ED PROVIDER NOTES
History     Chief Complaint   Patient presents with     Medication Reaction     Tingling in Bilateral Feet     HPI  Veronica Ansari is a 51 year old female who has been treated three times with ciprofloxacin, since April 11 th, for diverticulitis. She tested positive for covid six days ago, therefore her scheduled colonoscopy was not completed. Felt abdominal pain and bloating returning this past Monday. Was therefore evaluate at  in [place] and had a CT scan completed. She has currently completed one of two weeks of her third course of ciprofloxacin.  She now presents with complaints of tingling in both her feet and residential up into her calves, accompanied with a snapping sensation in the joints of her feet. The snapping sensation occurs occasionally, but she feels it is worse this past day.  Non-smoker. She feels her abdominal pain has resolved, but continues to have some bloating. Denies fevers, chills, nausea, vomiting, diarrhea, and shortness of breath.    Allergies:  Allergies   Allergen Reactions     Augmentin Diarrhea     Ciprofloxacin Unknown     Doxycycline Unknown     Flagyl [Metronidazole] Other (See Comments)     Food      Mangoes cause rash     Sulfamethoxazole W/Trimethoprim Rash       Problem List:    Patient Active Problem List    Diagnosis Date Noted     Diverticulitis 05/05/2021     Priority: Medium     Added automatically from request for surgery [id number]       Esophageal ulcer 05/21/2020     Priority: Medium     Added automatically from request for surgery [id number]       Biliary dyskinesia 11/14/2018     Priority: Medium     S/P abdominoplasty 04/02/2018     Priority: Medium     Overview:   complicated by right calf DVT       Irritable bowel syndrome with diarrhea 11/14/2017     Priority: Medium     Excessive or frequent menstruation 04/19/2016     Priority: Medium     Iron deficiency 05/23/2014     Priority: Medium     Overview:   Without anemia       Cervicalgia 09/05/2013     Priority: Medium      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       H/O deep venous thrombosis 03/10/2007     Priority: Medium     Overview:   Post-operative LE DVT       Fibrocystic breast disease 2006     Priority: Medium     Sinusitis, chronic 2000     Priority: Medium     Overview:   Chronic  IMO Update 10/11       Deviated nasal septum 2000     Priority: Medium        Past Medical History:    Past Medical History:   Diagnosis Date     Cervicalgia      Deviated nasal septum      Embolism and thrombosis (H)      Fibrocystic breast disease      Migraine      Prothrombin mutation (H)      Sinusitis        Past Surgical History:    Past Surgical History:   Procedure Laterality Date     ABDOMINOPLASTY       C BREAST AUGMENTATION       C LAP CHOLECYSTOSOMY        SECTION      x2     COLONOSCOPY - HIM SCAN  2011    Colonoscopy 2011     COLONOSCOPY - HIM SCAN  2018    Procedure:  Colonoscopy diagnostic with polypectomy and biopsies;  Surgeon:  Danelle Swift MD;  Location:  [facility] OR     COLONOSCOPY - HIM SCAN  2007    Colonoscopy, flex, diagnostic     DILATE CERVIX, HYSTEROSCOPY, ABLATE ENDOMETRIUM, COMBINED N/A 2016    Procedure: COMBINED DILATE CERVIX, HYSTEROSCOPY, ABLATE ENDOMETRIUM;  Surgeon: Pacheco Curry MD;  Location: [facility] OR     ESOPHAGOGASTRODUODENOSCOPY      negative     ESOPHAGOSCOPY, GASTROSCOPY, DUODENOSCOPY (EGD), COMBINED N/A 2020    Procedure: Upper Endoscopy with biopsy;  Surgeon: Son Cooper MD;  Location: [facility] OR       Family History:    History reviewed. No pertinent family history.    Social History:  Marital Status:   [2]  Social History     Tobacco Use     Smoking status: Former Smoker     Years: 3.00     Types: Cigarettes     Quit date:      Years since quittin.4     Smokeless tobacco: Never Used   Substance Use Topics     Alcohol use: Yes     Alcohol/week: 0.0 standard drinks      Comment: Occassional     Drug use: No        Medications:    amoxicillin-clavulanate (AUGMENTIN) 875-125 MG tablet  ciprofloxacin (CIPRO) 500 MG tablet  fluticasone (FLONASE) 50 MCG/ACT nasal spray  metroNIDAZOLE (FLAGYL) 500 MG tablet  omeprazole (PRILOSEC) 40 MG DR capsule  saccharomyces boulardii (FLORASTOR) 250 MG capsule  saccharomyces boulardii (FLORASTOR) 250 MG capsule          Review of Systems   Constitutional: Positive for activity change. Negative for chills and fever.   Respiratory: Negative for shortness of breath.    Gastrointestinal: Negative for diarrhea, nausea and vomiting.        Abdominal pain is gone. Bloating is better   Neurological: Negative for dizziness, light-headedness and headaches.       Physical Exam   BP: 151/91  Pulse: 80  Temp: 99.2  F (37.3  C)  Resp: 18  SpO2: 98 %      Physical Exam  Vitals signs and nursing note reviewed.   Constitutional:       General: She is in acute distress (mild).      Appearance: She is overweight.   HENT:      Head: Normocephalic.      Comments: No facial edema    Cardiovascular:      Rate and Rhythm: Normal rate and regular rhythm.      Pulses:           Dorsalis pedis pulses are 3+ on the right side and 3+ on the left side.        Posterior tibial pulses are 3+ on the right side and 3+ on the left side.      Heart sounds: Normal heart sounds. No murmur.   Pulmonary:      Effort: Pulmonary effort is normal. No respiratory distress.      Breath sounds: Normal breath sounds. No wheezing.   Abdominal:      General: Abdomen is flat. Bowel sounds are decreased. There is no distension.      Palpations: Abdomen is soft.      Tenderness: There is no abdominal tenderness. There is no right CVA tenderness, left CVA tenderness or guarding.   Musculoskeletal:         General: No swelling or tenderness.      Right foot: Normal range of motion.      Left foot: Normal range of motion.   Feet:      Right foot:      Skin integrity: Skin integrity normal. No erythema or  warmth.      Left foot:      Skin integrity: Skin integrity normal. No erythema or warmth.      Comments: Normal AROM bilateral feet and lower legs.  Skin:     General: Skin is warm and dry.      Findings: No bruising or erythema.   Neurological:      Mental Status: She is alert and oriented to person, place, and time.   Psychiatric:         Behavior: Behavior normal.         ED Course        Procedures             No results found for this or any previous visit (from the past 24 hour(s)).    Medications - No data to display    Assessments & Plan (with Medical Decision Making)     I have reviewed the nursing notes.    I have reviewed the findings, diagnosis, plan and need for follow up with the patient.  (T54.560D) Medication reaction, initial encounter  Comment: 51 year old female who has been treated three times with ciprofloxacin, since April 11 th, for diverticulitis. She tested positive for covid six days ago, therefore her scheduled colonoscopy was not completed. Felt abdominal pain and bloating returning this past Monday. Was therefore evaluate at  in [place] and had a CT scan completed. She has currently completed one of two weeks of her third course of ciprofloxacin.  She now presents with complaints of tingling in both her feet and intermediate up into her calves, accompanied with a snapping sensation in the joints of her feet. The snapping sensation occurs occasionally, but she feels it is worse this past day.  Non-smoker. She feels her abdominal pain has resolved, but continues to have some bloating. Denies fevers, chills, nausea, vomiting, diarrhea, and shortness of breath.    MDM: NHT. Lungs CTA   Full range of motion in bilateral lower extremities.  Pedal pulses are 3+.  No redness. Swelling. or ecchymosis noted.    Consulted with Dr. Purdy about changing her antibiotics and she thought this was a good plan.  However, Veronica develops a rash from sulfa medications, and diarrhea from Augmentin.  Had NATAN  difficile from taking Augmentin 4 to 5 years ago.  Spoke with Dr. Purdy concerning this and she recommended that we speak with pharmacy.  Pharmacy staff did suggest trying Augmentin again and adding Florastor.  She is to follow-up with Dr. Swift this week.    Plan: Stop taking ciprofloxacin.   Try tylenol for tingling and discomfort in legs. Avoid ibuprofen.  Start florastar as prescribed to prevent diarrhea from antibiotics and yogurt to your diet  Take medications with food unless instructed otherwise.  Follow-up this week with Dr Swift  Return to ER for blood in stool, fevers, abdominal pain or worsening of symptoms    This document was prepared using a combination of typing and voice generated software.  While every attempt was made for accuracy, spelling and grammatical errors may exist.    Discharge Medication List as of 5/22/2021 10:06 AM      START taking these medications    Details   amoxicillin-clavulanate (AUGMENTIN) 875-125 MG tablet Take 1 tablet by mouth 2 times daily for 10 days, Disp-20 tablet, R-0, E-Prescribe      !! saccharomyces boulardii (FLORASTOR) 250 MG capsule Take 1 capsule (250 mg) by mouth 2 times daily for 10 days, Disp-20 capsule, R-0, E-Prescribe       !! - Potential duplicate medications found. Please discuss with provider.          Final diagnoses:   Medication reaction, initial encounter       5/22/2021   [facility]       Michell Escobedo, CNP  05/23/21 2125

## 2021-05-22 NOTE — DISCHARGE INSTRUCTIONS
Stop taking ciprofloxacin.   Try tylenol for tingling and discomfort in legs. Avoid ibuprofen.  Start florastar as prescribed to prevent diarrhea from antibiotics and yogurt to your diet  Take medications with food unless instructed otherwise.  Follow-up this week with dr Swift  Return to ER for blood in stool, fevers, abdominal pain or worsening of symptoms

## 2021-05-22 NOTE — ED TRIAGE NOTES
Pt being tx for diverticulitis with cipro and flagyl, was told to come to ER if she experienced numbness and/or tingling in bilateral feet. Pt is presenting with these complaints today. Denies pain and any other complaints.

## 2021-05-24 ENCOUNTER — TELEPHONE (OUTPATIENT)
Dept: FAMILY MEDICINE | Facility: OTHER | Age: 51
End: 2021-05-24

## 2021-05-24 ENCOUNTER — VIRTUAL VISIT (OUTPATIENT)
Dept: FAMILY MEDICINE | Facility: OTHER | Age: 51
End: 2021-05-24
Attending: FAMILY MEDICINE
Payer: COMMERCIAL

## 2021-05-24 ENCOUNTER — MYC MEDICAL ADVICE (OUTPATIENT)
Dept: FAMILY MEDICINE | Facility: OTHER | Age: 51
End: 2021-05-24

## 2021-05-24 DIAGNOSIS — R19.7 DIARRHEA, UNSPECIFIED TYPE: Primary | ICD-10-CM

## 2021-05-24 DIAGNOSIS — Z86.19 HISTORY OF CLOSTRIDIOIDES DIFFICILE COLITIS: ICD-10-CM

## 2021-05-24 DIAGNOSIS — K57.32 SIGMOID DIVERTICULITIS: ICD-10-CM

## 2021-05-24 DIAGNOSIS — U07.1 CLINICAL DIAGNOSIS OF COVID-19: ICD-10-CM

## 2021-05-24 PROCEDURE — 99214 OFFICE O/P EST MOD 30 MIN: CPT | Mod: 95 | Performed by: FAMILY MEDICINE

## 2021-05-24 NOTE — TELEPHONE ENCOUNTER
Pt called, reports ongoing concerns with diverticulitis. States that she was to see Dr Swift for colonoscopy but this had to be rescheduled as pt tested positive for covid last week. Pt states that she is now on her 3rd course of antibiotics. Concerned that she could possibly have C diff. Pt requesting video/telephone visit with PCP. Please advise. Thank you!

## 2021-05-24 NOTE — TELEPHONE ENCOUNTER
See extensive my chart message.  I can do a phone visit today - schedule for 4:30 - but room now and will fit it in when able.

## 2021-05-24 NOTE — Clinical Note
See note.  Seeing you 5/28. Dr Colon 6/4.  Worried about recurrent c diff.  I ordered stool pcr.  Vaccinated for covid, but recent positive test.  Is there a way to test for covid colitis??  Worried about her antibiotic usage.  Had to stop the cipro due to tendon complications.  Thanks!

## 2021-05-25 DIAGNOSIS — R19.7 DIARRHEA, UNSPECIFIED TYPE: ICD-10-CM

## 2021-05-25 LAB
C DIFF TOX B STL QL: NEGATIVE
SPECIMEN SOURCE: NORMAL

## 2021-05-25 PROCEDURE — 87493 C DIFF AMPLIFIED PROBE: CPT | Performed by: FAMILY MEDICINE

## 2021-05-26 ENCOUNTER — VIRTUAL VISIT (OUTPATIENT)
Dept: SURGERY | Facility: OTHER | Age: 51
End: 2021-05-26
Attending: SURGERY
Payer: COMMERCIAL

## 2021-05-26 DIAGNOSIS — K58.0 IRRITABLE BOWEL SYNDROME WITH DIARRHEA: Primary | ICD-10-CM

## 2021-05-26 NOTE — NURSING NOTE
"Patient has questions about further problems, thinks that this is possibly Covid Colitis, patient would like to move up colonoscopy, patient is also wondering if they should extend their medication.      Chief Complaint   Patient presents with     Surgical Followup     er follow up       Initial LMP 04/11/2016 (LMP Unknown)  Estimated body mass index is 27.81 kg/m  as calculated from the following:    Height as of 5/12/21: 1.626 m (5' 4\").    Weight as of 5/12/21: 73.5 kg (162 lb).  Medication Reconciliation: complete  Lorie Humphries LPN      "

## 2021-05-26 NOTE — PROGRESS NOTES
"Veronica is a 51 year old who is being evaluated via a billable telephone visit.      What phone number would you like to be contacted at? [phone]  How would you like to obtain your AVS? MyChart        Objective    Vitals - Patient Reported  Height (Patient Reported): 504 cm (16' 6.43\")  Temperature (Patient Reported): 97.9  F (36.6  C)  Pulse (Patient Reported): 157  Pain Score: Moderate Pain (4)  Pain Loc: Abdomen    "

## 2021-05-26 NOTE — PATIENT INSTRUCTIONS
Thank you for allowing Dr. Swift and our surgical team to participate in your care. Please call our health unit coordinator at [phone] with scheduling questions or the nurse at [phone] with any other questions or concerns.

## 2021-06-01 ENCOUNTER — TELEPHONE (OUTPATIENT)
Dept: SURGERY | Facility: OTHER | Age: 51
End: 2021-06-01

## 2021-06-01 NOTE — TELEPHONE ENCOUNTER
Patient called and wants to cancel her colonscopy that is scheduled for tomorrow 06/02/21.    Patient stated that she is going to a new doctor

## 2021-06-01 NOTE — TELEPHONE ENCOUNTER
Patients colonoscopy has been cancelled.    How Severe Is Your Skin Lesion?: moderate Have Your Skin Lesions Been Treated?: not been treated Is This A New Presentation, Or A Follow-Up?: Skin Lesions

## 2021-06-04 ENCOUNTER — TRANSFERRED RECORDS (OUTPATIENT)
Dept: HEALTH INFORMATION MANAGEMENT | Facility: CLINIC | Age: 51
End: 2021-06-04

## 2021-06-30 ENCOUNTER — NURSE TRIAGE (OUTPATIENT)
Dept: FAMILY MEDICINE | Facility: OTHER | Age: 51
End: 2021-06-30

## 2021-06-30 NOTE — TELEPHONE ENCOUNTER
"Protocol recommends eval within 3 days for L hip pain present for the past few months. Pt wondering if pain is related to her diverticulitis. No numbness or weakness. Rates pain at 4-5/10 at worst. Please advise. Thank you!      Reason for Disposition    [1] MODERATE pain (e.g., interferes with normal activities, limping) AND [2] present > 3 days    Additional Information    Negative: Looks like a broken bone or dislocated joint (e.g., crooked or deformed)    Negative: Sounds like a life-threatening emergency to the triager    Negative: Followed a hip injury    Negative: Leg pain is main symptom    Negative: Back pain radiating (shooting) into hip    Negative: [1] SEVERE pain (e.g., excruciating, unable to do any normal activities) AND [2] fever    Negative: Can't stand (bear weight) or walk    Negative: [1] Red area or streak AND [2] fever    Negative: Patient sounds very sick or weak to the triager    Negative: [1] SEVERE pain (e.g., excruciating, unable to do any normal activities) AND [2] not improved after 2 hours of pain medicine    Negative: [1] Looks infected (spreading redness, pus) AND [2] large red area (> 2 in. or 5 cm)    Negative: [1] Painful rash AND [2] multiple small blisters grouped together (i.e., dermatomal distribution or \"band\" or \"stripe\")    Negative: Looks like a boil, infected sore, or deep ulcer    Negative: [1] Localized rash is very painful AND [2] no fever    Negative: [1] Redness of the skin AND [2] no fever    Negative: Numbness in a leg or foot (i.e., loss of sensation)    Answer Assessment - Initial Assessment Questions  1. LOCATION and RADIATION: \"Where is the pain located?\"       L hip  2. QUALITY: \"What does the pain feel like?\"  (e.g., sharp, dull, aching, burning)      dull  3. SEVERITY: \"How bad is the pain?\" \"What does it keep you from doing?\"   (Scale 1-10; or mild, moderate, severe)    -  MILD (1-3): doesn't interfere with normal activities     -  MODERATE (4-7): interferes " "with normal activities (e.g., work or school) or awakens from sleep, limping     -  SEVERE (8-10): excruciating pain, unable to do any normal activities, unable to walk      2/10 at rest, increases to 4-5/10 with movement  4. ONSET: \"When did the pain start?\" \"Does it come and go, or is it there all the time?\"      A few months ago  5. WORK OR EXERCISE: \"Has there been any recent work or exercise that involved this part of the body?\"       no  6. CAUSE: \"What do you think is causing the hip pain?\"       Not sure. Possibly related to diverticulitis??  7. AGGRAVATING FACTORS: \"What makes the hip pain worse?\" (e.g., walking, climbing stairs, running)      movement  8. OTHER SYMPTOMS: \"Do you have any other symptoms?\" (e.g., back pain, pain shooting down leg,  fever, rash)      no    Protocols used: HIP PAIN-A-AH      "

## 2021-07-01 ENCOUNTER — OFFICE VISIT (OUTPATIENT)
Dept: FAMILY MEDICINE | Facility: OTHER | Age: 51
End: 2021-07-01
Attending: FAMILY MEDICINE
Payer: COMMERCIAL

## 2021-07-01 ENCOUNTER — ANCILLARY PROCEDURE (OUTPATIENT)
Dept: GENERAL RADIOLOGY | Facility: OTHER | Age: 51
End: 2021-07-01
Attending: FAMILY MEDICINE
Payer: COMMERCIAL

## 2021-07-01 VITALS
TEMPERATURE: 97.5 F | SYSTOLIC BLOOD PRESSURE: 148 MMHG | BODY MASS INDEX: 26.61 KG/M2 | WEIGHT: 155 LBS | HEART RATE: 84 BPM | OXYGEN SATURATION: 99 % | DIASTOLIC BLOOD PRESSURE: 80 MMHG

## 2021-07-01 DIAGNOSIS — M25.552 LEFT HIP PAIN: Primary | ICD-10-CM

## 2021-07-01 DIAGNOSIS — Z87.19 HISTORY OF DIVERTICULITIS: ICD-10-CM

## 2021-07-01 LAB
BASOPHILS # BLD AUTO: 0 10E9/L (ref 0–0.2)
BASOPHILS NFR BLD AUTO: 0.3 %
CRP SERPL-MCNC: <2.9 MG/L (ref 0–8)
DIFFERENTIAL METHOD BLD: NORMAL
EOSINOPHIL # BLD AUTO: 0 10E9/L (ref 0–0.7)
EOSINOPHIL NFR BLD AUTO: 0.6 %
ERYTHROCYTE [DISTWIDTH] IN BLOOD BY AUTOMATED COUNT: 13.2 % (ref 10–15)
HCT VFR BLD AUTO: 44.9 % (ref 35–47)
HGB BLD-MCNC: 14.7 G/DL (ref 11.7–15.7)
IMM GRANULOCYTES # BLD: 0 10E9/L (ref 0–0.4)
IMM GRANULOCYTES NFR BLD: 0.3 %
LYMPHOCYTES # BLD AUTO: 1.6 10E9/L (ref 0.8–5.3)
LYMPHOCYTES NFR BLD AUTO: 24.6 %
MCH RBC QN AUTO: 29.6 PG (ref 26.5–33)
MCHC RBC AUTO-ENTMCNC: 32.7 G/DL (ref 31.5–36.5)
MCV RBC AUTO: 90 FL (ref 78–100)
MONOCYTES # BLD AUTO: 0.6 10E9/L (ref 0–1.3)
MONOCYTES NFR BLD AUTO: 8.9 %
NEUTROPHILS # BLD AUTO: 4.2 10E9/L (ref 1.6–8.3)
NEUTROPHILS NFR BLD AUTO: 65.3 %
NRBC # BLD AUTO: 0 10*3/UL
NRBC BLD AUTO-RTO: 0 /100
PLATELET # BLD AUTO: 344 10E9/L (ref 150–450)
RBC # BLD AUTO: 4.97 10E12/L (ref 3.8–5.2)
WBC # BLD AUTO: 6.5 10E9/L (ref 4–11)

## 2021-07-01 PROCEDURE — 85025 COMPLETE CBC W/AUTO DIFF WBC: CPT | Performed by: FAMILY MEDICINE

## 2021-07-01 PROCEDURE — 73502 X-RAY EXAM HIP UNI 2-3 VIEWS: CPT | Mod: TC | Performed by: RADIOLOGY

## 2021-07-01 PROCEDURE — 36415 COLL VENOUS BLD VENIPUNCTURE: CPT | Performed by: FAMILY MEDICINE

## 2021-07-01 PROCEDURE — 86140 C-REACTIVE PROTEIN: CPT | Performed by: FAMILY MEDICINE

## 2021-07-01 PROCEDURE — 99214 OFFICE O/P EST MOD 30 MIN: CPT | Performed by: FAMILY MEDICINE

## 2021-07-01 ASSESSMENT — PAIN SCALES - GENERAL: PAINLEVEL: MILD PAIN (2)

## 2021-07-01 NOTE — PROGRESS NOTES
Assessment & Plan     Left hip pain  Not typical tenderness of SI joint or brusitis.  Xray ok overall= read pending.  Will start with PT - patient requests Kayce at [facility].  Re-evaluate if not making progress.   Discussed sympotomatic cares.  - XR Hip Left 2-3 Views; Future  - PHYSICAL THERAPY REFERRAL; Future  - XR Hip Left 2-3 Views    History of diverticulitis  Labs normal.  Discussed dietary changes - fiber supplement, avoiding seeds/nuts, keeping hydrated.    Consider nutrition consult - possibly virtual?  - CBC with platelets and differential  - CRP, inflammation      Jenelle Rivera MD  [facility] - JOHANA Martin is a 51 year old who presents for the following health issues     HPI     Musculoskeletal problem/pain  Onset/Duration: 3 months  Description  Location: hip - left  Joint Swelling: no  Redness: no  Pain: YES  Warmth: no  Intensity:  2/10  Progression of Symptoms:  waxing and waning  Accompanying signs and symptoms:   Fevers: no  Numbness/tingling/weakness: no  History  Trauma to the area: no  Recent illness:  no  Previous similar problem: no  Previous evaluation:  no  Precipitating or alleviating factors:  Aggravating factors include: certain movements - cross legged, sitting in tub  Therapies tried and outcome: nothing; [facility] - [facility]    Wonders if related to her diverticulitis, which has been recurrent.  Has colonoscopy and hemorrhoid removal scheduled for 8/2021 with Dr Nava.  Inquiring about dietary changes.  Still irregular bowels.  No fever.    Review of Systems   Constitutional, HEENT, cardiovascular, pulmonary, gi and gu systems are negative, except as otherwise noted.      Objective    BP (!) 148/80   Pulse 84   Temp 97.5  F (36.4  C) (Tympanic)   Wt 70.3 kg (155 lb)   LMP 04/11/2016 (LMP Unknown)   SpO2 99%   BMI 26.61 kg/m    Body mass index is 26.61 kg/m .  Physical Exam   GENERAL: healthy, alert and no distress  NECK: no  adenopathy, no asymmetry, masses, or scars and thyroid normal to palpation  RESP: lungs clear to auscultation - no rales, rhonchi or wheezes  CV: regular rate and rhythm, normal S1 S2, no S3 or S4, no murmur, click or rub, no peripheral edema and peripheral pulses strong  ABDOMEN: no organomegaly or masses, bowel sounds normal and mild tenderness throughout abdomen on palpation; no guarding  MS: normal muscle tone, no edema, peripheral pulses normal and non-tender over lumbar spine, SI joints, or greater trochanters; fair flexibility; normal AROM, slightly reduced internal rotation, left hip, and mild discomfort with internal and external rotation; no pain with flexion  SKIN: no suspicious lesions or rashes  NEURO: Normal strength and tone, mentation intact and speech normal  PSYCH: mentation appears normal, affect normal/bright    Xray - Reviewed and interpreted by me.  Glenohumeral joint appears normal.  Some degenerative changes along SI joint? Formal read pending.    Results for orders placed or performed in visit on 07/01/21 (from the past 24 hour(s))   CBC with platelets and differential   Result Value Ref Range    WBC 6.5 4.0 - 11.0 10e9/L    RBC Count 4.97 3.8 - 5.2 10e12/L    Hemoglobin 14.7 11.7 - 15.7 g/dL    Hematocrit 44.9 35.0 - 47.0 %    MCV 90 78 - 100 fl    MCH 29.6 26.5 - 33.0 pg    MCHC 32.7 31.5 - 36.5 g/dL    RDW 13.2 10.0 - 15.0 %    Platelet Count 344 150 - 450 10e9/L    Diff Method Automated Method     % Neutrophils 65.3 %    % Lymphocytes 24.6 %    % Monocytes 8.9 %    % Eosinophils 0.6 %    % Basophils 0.3 %    % Immature Granulocytes 0.3 %    Nucleated RBCs 0 0 /100    Absolute Neutrophil 4.2 1.6 - 8.3 10e9/L    Absolute Lymphocytes 1.6 0.8 - 5.3 10e9/L    Absolute Monocytes 0.6 0.0 - 1.3 10e9/L    Absolute Eosinophils 0.0 0.0 - 0.7 10e9/L    Absolute Basophils 0.0 0.0 - 0.2 10e9/L    Abs Immature Granulocytes 0.0 0 - 0.4 10e9/L    Absolute Nucleated RBC 0.0    CRP, inflammation   Result  Value Ref Range    CRP Inflammation <2.9 0.0 - 8.0 mg/L

## 2021-07-01 NOTE — NURSING NOTE
"Chief Complaint   Patient presents with     Hip Pain     left hip       Initial BP (!) 148/80   Pulse 84   Temp 97.5  F (36.4  C) (Tympanic)   Wt 74.8 kg (165 lb)   LMP 04/11/2016 (LMP Unknown)   SpO2 99%   BMI 28.32 kg/m   Estimated body mass index is 28.32 kg/m  as calculated from the following:    Height as of 5/12/21: 1.626 m (5' 4\").    Weight as of this encounter: 74.8 kg (165 lb).  Medication Reconciliation: complete  Luis Montalvo LPN  "

## 2021-07-06 PROBLEM — K64.8 INTERNAL HEMORRHOIDS WITH COMPLICATION: Status: ACTIVE | Noted: 2021-06-08

## 2021-07-06 PROBLEM — Z12.11 COLON CANCER SCREENING: Status: ACTIVE | Noted: 2021-06-08

## 2021-07-06 NOTE — PROGRESS NOTES
"    Assessment & Plan     Right foot pain  Noticed only days ago, but callous formation indicates presence longer.  Xray obtained - read pending.  On lateral view - there does appear to be a soft tissue density in the affected area.  No bone changes in that region that I see.    Will consult podiatry - Dr Bui.  May benefit from orthotics.    Patient reports \"hot feet\" at night, but no pain.  Only since being on various courses of antibiotics.  Consider low dose Neurontin HS.    - XR Foot Right G/E 3 Views; Future  - Orthopedic  Referral; Future       See Patient Instructions        Jenelle Rivera MD  [facility] - JOHANA Martin is a 51 year old who presents for the following health issues     HPI     Concern - Foot issue  Onset: noticed this past Monday-7/5/21  Description: bump/mass on right foot  Intensity: moderate  Progression of Symptoms:  same and constant  Accompanying Signs & Symptoms: uncomfortable to walk on  Previous history of similar problem: none  Precipitating factors:        Worsened by: none  Alleviating factors:        Improved by: none  Therapies tried and outcome: ice-no change   Noted walking barefoot.  Noticed it then.      Review of Systems   Constitutional, HEENT, cardiovascular, pulmonary, gi and gu systems are negative, except as otherwise noted.      Objective    /76 (BP Location: Right arm, Patient Position: Sitting, Cuff Size: Adult Regular)   Pulse 68   Temp 98.4  F (36.9  C) (Tympanic)   Ht 1.626 m (5' 4\")   Wt 71.7 kg (158 lb)   LMP 04/11/2016 (LMP Unknown)   SpO2 98%   BMI 27.12 kg/m    Body mass index is 27.12 kg/m .  Physical Exam   GENERAL: healthy, alert and no distress  MS: normal muscle tone, no edema, peripheral pulses normal and right foot plantar surface, laterally, there is an overlying callous formation, with palpable firm mass below; mildly tender  SKIN: no suspicious lesions or rashes  NEURO: Normal strength and " tone, mentation intact and speech normal  PSYCH: mentation appears normal, affect normal/bright    Xray - read pending.  No results found for this or any previous visit (from the past 24 hour(s)).

## 2021-07-08 ENCOUNTER — OFFICE VISIT (OUTPATIENT)
Dept: FAMILY MEDICINE | Facility: OTHER | Age: 51
End: 2021-07-08
Attending: FAMILY MEDICINE
Payer: COMMERCIAL

## 2021-07-08 ENCOUNTER — ANCILLARY PROCEDURE (OUTPATIENT)
Dept: GENERAL RADIOLOGY | Facility: OTHER | Age: 51
End: 2021-07-08
Attending: FAMILY MEDICINE
Payer: COMMERCIAL

## 2021-07-08 VITALS
BODY MASS INDEX: 26.98 KG/M2 | HEIGHT: 64 IN | OXYGEN SATURATION: 98 % | TEMPERATURE: 98.4 F | DIASTOLIC BLOOD PRESSURE: 76 MMHG | HEART RATE: 68 BPM | WEIGHT: 158 LBS | SYSTOLIC BLOOD PRESSURE: 118 MMHG

## 2021-07-08 DIAGNOSIS — M79.671 RIGHT FOOT PAIN: ICD-10-CM

## 2021-07-08 DIAGNOSIS — M79.671 RIGHT FOOT PAIN: Primary | ICD-10-CM

## 2021-07-08 PROCEDURE — 99213 OFFICE O/P EST LOW 20 MIN: CPT | Performed by: FAMILY MEDICINE

## 2021-07-08 PROCEDURE — 73630 X-RAY EXAM OF FOOT: CPT | Mod: TC | Performed by: RADIOLOGY

## 2021-07-08 ASSESSMENT — MIFFLIN-ST. JEOR: SCORE: 1306.68

## 2021-07-08 ASSESSMENT — PAIN SCALES - GENERAL: PAINLEVEL: NO PAIN (0)

## 2021-07-08 NOTE — NURSING NOTE
"Chief Complaint   Patient presents with     Mass       Initial /76 (BP Location: Right arm, Patient Position: Sitting, Cuff Size: Adult Regular)   Pulse 68   Temp 98.4  F (36.9  C) (Tympanic)   Ht 1.626 m (5' 4\")   Wt 71.7 kg (158 lb)   LMP 04/11/2016 (LMP Unknown)   SpO2 98%   BMI 27.12 kg/m   Estimated body mass index is 27.12 kg/m  as calculated from the following:    Height as of this encounter: 1.626 m (5' 4\").    Weight as of this encounter: 71.7 kg (158 lb).  Medication Reconciliation: complete  Bertha Gary LPN  "

## 2021-07-20 ENCOUNTER — HOSPITAL ENCOUNTER (OUTPATIENT)
Dept: ULTRASOUND IMAGING | Facility: HOSPITAL | Age: 51
Discharge: HOME | End: 2021-07-20
Attending: OBSTETRICS & GYNECOLOGY | Admitting: OBSTETRICS & GYNECOLOGY
Payer: COMMERCIAL

## 2021-07-20 DIAGNOSIS — N83.202 BILATERAL OVARIAN CYSTS: ICD-10-CM

## 2021-07-20 DIAGNOSIS — N83.202 BILATERAL OVARIAN CYSTS: Primary | ICD-10-CM

## 2021-07-20 DIAGNOSIS — N83.201 BILATERAL OVARIAN CYSTS: Primary | ICD-10-CM

## 2021-07-20 DIAGNOSIS — N83.201 BILATERAL OVARIAN CYSTS: ICD-10-CM

## 2021-07-20 PROCEDURE — 76830 TRANSVAGINAL US NON-OB: CPT

## 2021-07-20 PROCEDURE — 76856 US EXAM PELVIC COMPLETE: CPT

## 2021-07-22 ENCOUNTER — OFFICE VISIT (OUTPATIENT)
Dept: PODIATRY | Facility: OTHER | Age: 51
End: 2021-07-22
Attending: FAMILY MEDICINE
Payer: COMMERCIAL

## 2021-07-22 ENCOUNTER — HOSPITAL ENCOUNTER (EMERGENCY)
Facility: HOSPITAL | Age: 51
End: 2021-07-22
Payer: COMMERCIAL

## 2021-07-22 VITALS
WEIGHT: 160 LBS | SYSTOLIC BLOOD PRESSURE: 137 MMHG | HEIGHT: 64 IN | TEMPERATURE: 96.2 F | DIASTOLIC BLOOD PRESSURE: 89 MMHG | OXYGEN SATURATION: 98 % | HEART RATE: 62 BPM | BODY MASS INDEX: 27.31 KG/M2

## 2021-07-22 DIAGNOSIS — M20.5X2 ACQUIRED MALLET TOE OF LEFT FOOT: ICD-10-CM

## 2021-07-22 DIAGNOSIS — S92.351K CLOSED DISPLACED FRACTURE OF FIFTH METATARSAL BONE OF RIGHT FOOT WITH NONUNION: ICD-10-CM

## 2021-07-22 DIAGNOSIS — M62.461 GASTROCNEMIUS EQUINUS OF RIGHT LOWER EXTREMITY: ICD-10-CM

## 2021-07-22 DIAGNOSIS — M21.41 PES PLANUS OF BOTH FEET: ICD-10-CM

## 2021-07-22 DIAGNOSIS — M21.42 PES PLANUS OF BOTH FEET: ICD-10-CM

## 2021-07-22 DIAGNOSIS — M62.462 GASTROCNEMIUS EQUINUS OF LEFT LOWER EXTREMITY: ICD-10-CM

## 2021-07-22 DIAGNOSIS — M20.22 HALLUX RIGIDUS OF LEFT FOOT: Primary | ICD-10-CM

## 2021-07-22 DIAGNOSIS — M79.671 RIGHT FOOT PAIN: ICD-10-CM

## 2021-07-22 DIAGNOSIS — L84 CALLUS OF FOOT: ICD-10-CM

## 2021-07-22 PROCEDURE — 99203 OFFICE O/P NEW LOW 30 MIN: CPT | Mod: 25 | Performed by: PODIATRIST

## 2021-07-22 PROCEDURE — 20600 DRAIN/INJ JOINT/BURSA W/O US: CPT | Mod: LT | Performed by: PODIATRIST

## 2021-07-22 RX ORDER — TRIAMCINOLONE ACETONIDE 40 MG/ML
20 INJECTION, SUSPENSION INTRA-ARTICULAR; INTRAMUSCULAR ONCE
Status: COMPLETED | OUTPATIENT
Start: 2021-07-22 | End: 2021-07-22

## 2021-07-22 RX ADMIN — TRIAMCINOLONE ACETONIDE 20 MG: 40 INJECTION, SUSPENSION INTRA-ARTICULAR; INTRAMUSCULAR at 09:04

## 2021-07-22 ASSESSMENT — MIFFLIN-ST. JEOR: SCORE: 1315.76

## 2021-07-22 ASSESSMENT — PAIN SCALES - GENERAL: PAINLEVEL: MILD PAIN (3)

## 2021-07-22 NOTE — PROGRESS NOTES
Clinic Administered Medication Documentation    Administrations This Visit     triamcinolone (KENALOG-40) injection 20 mg     Admin Date  07/22/2021 Action  Given Dose  20 mg Route  INTRA-ARTICULAR Site   Administered By  Arianna Jenkins LPN    Ordering Provider: Nya Bui DPM    Patient Supplied?: No                  C

## 2021-07-22 NOTE — PATIENT INSTRUCTIONS
Thank you for allowing  and our Podiatry team to participate in your care. Please call our office at [phone] with scheduling questions or with any other questions or concerns.

## 2021-07-22 NOTE — PROGRESS NOTES
"Chief complaint: Patient presents with:  Pain: bilateral feet      History of Present Illness: This 51 year old female is seen at the request of Sara for evaluation and suggestions of management of RIGHT foot pain. She saw her PCP for a bump on the RIGHT lateral foot. She says the bump no the RIGHT lateral foot has bothered her for a couple of weeks. Walking barefoot is what causes pain. She does not remember in specific trauma but she says a recent x-ray revealed an old fracture of the fifth metatarsal base.    She also has LEFT 1st MTPJ pain. She has had injections with Dr. Kumar every six months int he past and this has helped a lot with pain relief. The distal LEFT 2nd digit sometimes gives her pain as well. The 1st MTPJ pain has been ongoing for about six months and the LEFT distal 2nd digit has been painful about a year. The LEFT 1st MTPJ pain has been the same pain level for about five years and a lot of walking increases the pain.    No further pedal complaints today.     Patient does not use tobacco products.         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)     S/P abdominoplasty     Irritable bowel syndrome with diarrhea     Iron deficiency     H/O deep venous thrombosis     Cervicalgia     Deviated nasal septum     Fibrocystic breast disease     Esophageal ulcer     Biliary dyskinesia     Diverticulitis     Colon cancer screening     Internal hemorrhoids with complication     Migraine       Past Surgical History:   Procedure Laterality Date     ABDOMINOPLASTY       C BREAST AUGMENTATION       C LAP CHOLECYSTOSOMY        SECTION      x2     " COLONOSCOPY - HIM SCAN  2011    Colonoscopy 2011     COLONOSCOPY - HIM SCAN  2018    Procedure:  Colonoscopy diagnostic with polypectomy and biopsies;  Surgeon:  Danelle Swift MD;  Location:  [facility] OR     COLONOSCOPY - HIM SCAN  2007    Colonoscopy, flex, diagnostic     DILATE CERVIX, HYSTEROSCOPY, ABLATE ENDOMETRIUM, COMBINED N/A 2016    Procedure: COMBINED DILATE CERVIX, HYSTEROSCOPY, ABLATE ENDOMETRIUM;  Surgeon: Pacheco Curry MD;  Location: [facility] OR     ESOPHAGOGASTRODUODENOSCOPY      negative     ESOPHAGOSCOPY, GASTROSCOPY, DUODENOSCOPY (EGD), COMBINED N/A 2020    Procedure: Upper Endoscopy with biopsy;  Surgeon: Son Cooper MD;  Location: [facility] OR     ORTHOPEDIC SURGERY Left     rotator cuff repair and bicep tendon repair       Current Outpatient Medications   Medication     fluticasone (FLONASE) 50 MCG/ACT nasal spray     omeprazole (PRILOSEC) 40 MG DR capsule     saccharomyces boulardii (FLORASTOR) 250 MG capsule     No current facility-administered medications for this visit.          Allergies   Allergen Reactions     Augmentin Diarrhea     Ciprofloxacin Unknown     Doxycycline Unknown     Flagyl [Metronidazole] Other (See Comments)     Food      Mangoes cause rash     Sulfamethoxazole W/Trimethoprim Rash       History reviewed. No pertinent family history.    Social History     Socioeconomic History     Marital status:      Spouse name: None     Number of children: None     Years of education: None     Highest education level: None   Occupational History     None   Tobacco Use     Smoking status: Former Smoker     Years: 3.00     Types: Cigarettes     Quit date:      Years since quittin.5     Smokeless tobacco: Never Used   Substance and Sexual Activity     Alcohol use: Yes     Alcohol/week: 0.0 standard drinks     Comment: Occassional     Drug use: No     Sexual activity: None   Other Topics Concern     Parent/sibling w/ CABG, MI or angioplasty  before 65F 55M? Not Asked   Social History Narrative     None     Social Determinants of Health     Financial Resource Strain:      Difficulty of Paying Living Expenses:    Food Insecurity:      Worried About Running Out of Food in the Last Year:      Ran Out of Food in the Last Year:    Transportation Needs:      Lack of Transportation (Medical):      Lack of Transportation (Non-Medical):    Physical Activity:      Days of Exercise per Week:      Minutes of Exercise per Session:    Stress:      Feeling of Stress :    Social Connections:      Frequency of Communication with Friends and Family:      Frequency of Social Gatherings with Friends and Family:      Attends [employer] Services:      Active Member of Clubs or Organizations:      Attends Club or Organization Meetings:      Marital Status:    Intimate Partner Violence:      Fear of Current or Ex-Partner:      Emotionally Abused:      Physically Abused:      Sexually Abused:        ROS: 10 point ROS neg other than the symptoms noted above in the HPI.  EXAM  Constitutional: healthy, alert and no distress    Psychiatric: mentation appears normal and affect normal/bright    VASCULAR:  -Dorsalis pedis pulse +2/4 b/l  -Posterior tibial pulse +2/4 b/l  -Capillary refill time < 3 seconds to b/l hallux  NEURO:  -Light touch sensation intact to b/l plantar forefoot  DERM:  -Skin temperature, texture and turgor WNL b/l  -Mild hyperkeratotic lesion to the distal plantar tip of the LEFT 2nd digit  MSK:  -No pain on palpation to RIGHT lateral, dorsal or plantar 5th metatarsal base  -No Pain on palpation along the peroneal tendons (although patient points the peronel tendon course as an area that sometimes gets sore whiles she is walking)  -Pain on palpation to LEFT dorsal 1st MTPJ  -LEFT 1st MTPJ has mild bony prominence on the dorsal and medial aspect of the 1st metatarsal head  ---ROM limited to < 20 degrees DORSIFLEXION with loading the forefot    -Mild PLANTARFLEXION of  the DIPJ of the LEFT foot DIPJ    -Muscle strength of ankles +5/5 for dorsiflexion, plantarflexion, ABDUction and ADDuction b/l    -Ankle joint passive ROM within normal limits except for dorsiflexion:    Dorsiflexion, RIGHT Straight knee 0 degrees    Dorsiflexion, LEFT Straight knee 0 degrees    -Moderate decrease in arch height while patient is NWB    RIGHT FOOT RADIOGRAPH 07/08/2021  IMPRESSION: 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 Treatment options discussed with no educational barriers noted.  -Discussed etiology and treatment options for hallux rigidus:  ---Discussed how this deformity can progress and what can be done to treat the deformity. Also explained how elevation of the first ray can cause distal 2nd digit pressure and pain, especially with curling of the toe.  ---Conservative treatment options consist of wider shoe gear and orthotics +/- padding/splinting to accommodate the painful toe. There may be pain relief from a hernandez's extension or a reverse hernandez's extension in an orthotic. This will not correct the deformity, but may help decrease pain. Patient may also benefit from an injection for more acute pain. She has had months of relief from injections in the past. This is does not usually provide long term pain relief without also addressing the biomechanics, but it may improve his overall pain.   ---Discussed surgical  treatment options including risks and benefits and post-op periods. The surgical procedure of choice is dependent on amount of joint space remaining and radiographic angles.  ---At this time, patient would like to try the orthotics and an injection. He will consider surgical treatment if the orthotics do not help his pain at all.    -Injection x 1st MTPJ to the LEFT foot: Obtained written and verbal consent from patient for a steroid injection into the  1s MTPJ of the Left foot. Reviewed risks and benefits of the injection. Informed patient that the injection may decrease pain long-term, short-term, or not at all. Patient in agreement with an injection today in an effort to slow or reverse the chronic inflammatory process and pain in the foot.   ---Patient signed informed consent and a time-out was performed and there was verification of correct patient, procedure and laterality.  ---Prepped the injection site with an alcohol swab.  ---Injected a total of 0.5 mL (20mg) Kenalog and 0.5mL of 2% Lidocaine plain. Patient had this same steroid injected into the joint space in the past with successful pain relief. Patient tolerated the injection well.    -Patient would also like to consider an orthotic (see below)  -------------------------------------------------    -Nonunion of fifth metatarsal base fracture:  ---Patient's fracture looks chronic in nature. This area of her foot only started to feel a little painful while walking barefoot a couple weeks ago and she otherwise has no pain from the fracture. Surgical correction is not advised at this time since the fracture has likley been present fo ra long time and she has been pain free. To surgically correct the bone, the bone edges would require debridement which would make fixation even more challenging with a small piece of bone. She is advised to avoid barefoot walking   ---She presents in a semi-firm sandal -- advised her to try Oofos sandals for more arch support  with more cushioning.  ----Orthotic referral through the orthotist, Danielle Frank per patient request for CMOs with a RIGHT plantar 5th metatarsal base offloading and LEFT foot hernandez's extension vs. Reverse extension or a solid OTC insert for additional foot support. Patient educated on slowly transitioning into the inserts. The patient may call the orthotist to make adjustments if the inserts are not comfortable after consistently wearing them for 4-6 weeks.     -Patient in agreement with the above treatment plan and all of patient's questions were answered.      RTC as needed per patient request        Nya Bui DPM

## 2021-07-22 NOTE — NURSING NOTE
"Chief Complaint   Patient presents with     Pain     bilateral feet       Initial /89 (BP Location: Left arm, Patient Position: Sitting, Cuff Size: Adult Regular)   Pulse 62   Temp (!) 96.2  F (35.7  C) (Tympanic)   Ht 1.626 m (5' 4\")   Wt 72.6 kg (160 lb)   LMP 04/11/2016 (LMP Unknown)   SpO2 98%   BMI 27.46 kg/m   Estimated body mass index is 27.46 kg/m  as calculated from the following:    Height as of this encounter: 1.626 m (5' 4\").    Weight as of this encounter: 72.6 kg (160 lb).  Medication Reconciliation: complete  Arianna Jenkins LPN  "

## 2021-08-02 ENCOUNTER — TELEPHONE (OUTPATIENT)
Dept: FAMILY MEDICINE | Facility: OTHER | Age: 51
End: 2021-08-02

## 2021-08-04 ENCOUNTER — HOSPITAL ENCOUNTER (OUTPATIENT)
Dept: MRI IMAGING | Facility: HOSPITAL | Age: 51
Discharge: HOME | End: 2021-08-04
Attending: OBSTETRICS & GYNECOLOGY | Admitting: OBSTETRICS & GYNECOLOGY
Payer: COMMERCIAL

## 2021-08-04 DIAGNOSIS — N83.201 BILATERAL OVARIAN CYSTS: ICD-10-CM

## 2021-08-04 DIAGNOSIS — N83.202 BILATERAL OVARIAN CYSTS: ICD-10-CM

## 2021-08-04 PROCEDURE — A9585 GADOBUTROL INJECTION: HCPCS | Performed by: RADIOLOGY

## 2021-08-04 PROCEDURE — 72197 MRI PELVIS W/O & W/DYE: CPT

## 2021-08-04 PROCEDURE — 255N000002 HC RX 255 OP 636: Performed by: RADIOLOGY

## 2021-08-04 RX ORDER — GADOBUTROL 604.72 MG/ML
7.5 INJECTION INTRAVENOUS ONCE
Status: COMPLETED | OUTPATIENT
Start: 2021-08-04 | End: 2021-08-04

## 2021-08-04 RX ADMIN — GADOBUTROL 7.5 ML: 604.72 INJECTION INTRAVENOUS at 17:42

## 2021-08-16 ENCOUNTER — MYC MEDICAL ADVICE (OUTPATIENT)
Dept: FAMILY MEDICINE | Facility: OTHER | Age: 51
End: 2021-08-16

## 2021-09-05 ENCOUNTER — HEALTH MAINTENANCE LETTER (OUTPATIENT)
Age: 51
End: 2021-09-05

## 2021-09-11 ENCOUNTER — HOSPITAL ENCOUNTER (EMERGENCY)
Facility: HOSPITAL | Age: 51
Discharge: HOME | End: 2021-09-11
Attending: NURSE PRACTITIONER | Admitting: NURSE PRACTITIONER
Payer: COMMERCIAL

## 2021-09-11 VITALS
DIASTOLIC BLOOD PRESSURE: 90 MMHG | HEART RATE: 74 BPM | RESPIRATION RATE: 16 BRPM | SYSTOLIC BLOOD PRESSURE: 146 MMHG | TEMPERATURE: 98.2 F | OXYGEN SATURATION: 98 %

## 2021-09-11 DIAGNOSIS — J06.9 VIRAL URI WITH COUGH: Primary | ICD-10-CM

## 2021-09-11 LAB
GROUP A STREP BY PCR: NOT DETECTED
SARS-COV-2 RNA RESP QL NAA+PROBE: NEGATIVE

## 2021-09-11 PROCEDURE — 99213 OFFICE O/P EST LOW 20 MIN: CPT | Performed by: NURSE PRACTITIONER

## 2021-09-11 PROCEDURE — C9803 HOPD COVID-19 SPEC COLLECT: HCPCS

## 2021-09-11 PROCEDURE — U0005 INFEC AGEN DETEC AMPLI PROBE: HCPCS | Performed by: NURSE PRACTITIONER

## 2021-09-11 PROCEDURE — 87651 STREP A DNA AMP PROBE: CPT | Performed by: NURSE PRACTITIONER

## 2021-09-11 PROCEDURE — G0463 HOSPITAL OUTPT CLINIC VISIT: HCPCS

## 2021-09-11 ASSESSMENT — ENCOUNTER SYMPTOMS
EYE REDNESS: 0
EYE PAIN: 0
FATIGUE: 1
SINUS PRESSURE: 1
RHINORRHEA: 1
COUGH: 1
NAUSEA: 0
TROUBLE SWALLOWING: 1
PSYCHIATRIC NEGATIVE: 1
EYE ITCHING: 0
SHORTNESS OF BREATH: 0
FEVER: 0
MYALGIAS: 0
SORE THROAT: 1
DIARRHEA: 0
SINUS PAIN: 1
VOMITING: 0
HEADACHES: 0
CHILLS: 0

## 2021-09-11 NOTE — ED PROVIDER NOTES
History     Chief Complaint   Patient presents with     Pharyngitis     HPI  Veronica Ansari is a 51 year old female who presents to urgent care today (ambulatory) with complaints of fatigue, congestion, rhinorrhea, sinus pain and pressure, sore throat and cough which started a week and a half ago.  Granddaughter sick with similar symptoms.  Would like COVID Test and strep test as patients primary concern is to make sure she is not contagious when she is around her grandchildren frequently.  Had COVID 5/2021.  Had COVID Vaccine series completed 4/6/2021.  Staying hydrated.  Denies fever, chills, nausea, vomiting, diarrhea, SOB or chest pain.  Has been taking APAP and Flonase which is helpful.  No other concerns.        Allergies:  Allergies   Allergen Reactions     Augmentin Diarrhea     Ciprofloxacin Unknown     Doxycycline Unknown     Flagyl [Metronidazole] Other (See Comments)     Food      Mangoes cause rash     Sulfamethoxazole W/Trimethoprim Rash       Problem List:    Patient Active Problem List    Diagnosis Date Noted     Colon cancer screening 06/08/2021     Priority: Medium     Formatting of this note might be different from the original.  Added automatically from request for surgery [id number]       Internal hemorrhoids with complication 06/08/2021     Priority: Medium     Formatting of this note might be different from the original.  Added automatically from request for surgery [id number]       Diverticulitis 05/05/2021     Priority: Medium     Added automatically from request for surgery [id number]       Esophageal ulcer 05/21/2020     Priority: Medium     Added automatically from request for surgery [id number]       Biliary dyskinesia 11/14/2018     Priority: Medium     S/P abdominoplasty 04/02/2018     Priority: Medium     Overview:   complicated by right calf DVT       Irritable bowel syndrome with diarrhea 11/14/2017     Priority: Medium     Excessive or frequent menstruation 04/19/2016     Priority: Medium      Iron deficiency 2014     Priority: Medium     Overview:   Without anemia       Cervicalgia 2013     Priority: Medium     Prothrombin [id number] mutation (H) 2013     Priority: Medium     Overview:   Heterozygous       Migraine 2012     Priority: Medium     Formatting of this note might be different from the original.  IMO Update       H/O deep venous thrombosis 03/10/2007     Priority: Medium     Overview:   Post-operative LE DVT       Fibrocystic breast disease 2006     Priority: Medium     Sinusitis, chronic 2000     Priority: Medium     Overview:   Chronic  IMO Update 10/11       Deviated nasal septum 2000     Priority: Medium        Past Medical History:    Past Medical History:   Diagnosis Date     Cervicalgia      Deviated nasal septum      Embolism and thrombosis (H)      Fibrocystic breast disease      Migraine      Prothrombin mutation (H)      Sinusitis        Past Surgical History:    Past Surgical History:   Procedure Laterality Date     ABDOMINOPLASTY       C BREAST AUGMENTATION       C LAP CHOLECYSTOSOMY        SECTION      x2     COLONOSCOPY - HIM SCAN  2011    Colonoscopy 2011     COLONOSCOPY - HIM SCAN  2018    Procedure:  Colonoscopy diagnostic with polypectomy and biopsies;  Surgeon:  Danelle Swift MD;  Location:  [facility] OR     COLONOSCOPY - HIM SCAN  2007    Colonoscopy, flex, diagnostic     DILATE CERVIX, HYSTEROSCOPY, ABLATE ENDOMETRIUM, COMBINED N/A 2016    Procedure: COMBINED DILATE CERVIX, HYSTEROSCOPY, ABLATE ENDOMETRIUM;  Surgeon: Pacheco Curry MD;  Location: [facility] OR     ESOPHAGOGASTRODUODENOSCOPY      negative     ESOPHAGOSCOPY, GASTROSCOPY, DUODENOSCOPY (EGD), COMBINED N/A 2020    Procedure: Upper Endoscopy with biopsy;  Surgeon: Son Cooper MD;  Location: [facility] OR     ORTHOPEDIC SURGERY Left     rotator cuff repair and bicep tendon repair       Family History:    No family history on  file.    Social History:  Marital Status:   [2]  Social History     Tobacco Use     Smoking status: Former Smoker     Years: 3.00     Types: Cigarettes     Quit date:      Years since quittin.7     Smokeless tobacco: Never Used   Substance Use Topics     Alcohol use: Yes     Alcohol/week: 0.0 standard drinks     Comment: Occassional     Drug use: No        Medications:    fluticasone (FLONASE) 50 MCG/ACT nasal spray  omeprazole (PRILOSEC) 40 MG DR capsule  saccharomyces boulardii (FLORASTOR) 250 MG capsule      Review of Systems   Constitutional: Positive for fatigue. Negative for chills and fever.   HENT: Positive for congestion, rhinorrhea, sinus pressure, sinus pain, sore throat and trouble swallowing (Pain with swallowing). Negative for ear pain.    Eyes: Negative for pain, redness and itching.   Respiratory: Positive for cough. Negative for shortness of breath.    Cardiovascular: Negative for chest pain.   Gastrointestinal: Negative for diarrhea, nausea and vomiting.   Musculoskeletal: Negative for myalgias.   Skin: Negative for rash.   Neurological: Negative for headaches.   Psychiatric/Behavioral: Negative.      Physical Exam   BP: 146/90  Pulse: 74  Temp: 98.2  F (36.8  C)  Resp: 16  SpO2: 98 %    Physical Exam  Vitals and nursing note reviewed.   Constitutional:       General: She is not in acute distress.     Appearance: Normal appearance. She is not ill-appearing.   HENT:      Head: Normocephalic.      Right Ear: Tympanic membrane, ear canal and external ear normal.      Left Ear: Tympanic membrane, ear canal and external ear normal.      Nose: Congestion and rhinorrhea present.      Mouth/Throat:      Mouth: Mucous membranes are moist.      Pharynx: Oropharynx is clear. Posterior oropharyngeal erythema present.      Tonsils: No tonsillar exudate or tonsillar abscesses. 1+ on the right. 1+ on the left.   Eyes:      Extraocular Movements: Extraocular movements intact.       Conjunctiva/sclera: Conjunctivae normal.      Pupils: Pupils are equal, round, and reactive to light.   Cardiovascular:      Rate and Rhythm: Normal rate and regular rhythm.      Pulses: Normal pulses.      Heart sounds: Normal heart sounds.   Pulmonary:      Effort: Pulmonary effort is normal.      Breath sounds: Normal breath sounds.   Musculoskeletal:      Cervical back: Normal range of motion and neck supple. No rigidity or tenderness.   Lymphadenopathy:      Cervical: No cervical adenopathy.   Neurological:      Mental Status: She is alert.   Psychiatric:         Mood and Affect: Mood normal.       ED Course     No results found for this or any previous visit (from the past 24 hour(s)).    Medications - No data to display    Assessments & Plan (with Medical Decision Making)     I have reviewed the nursing notes.    I have reviewed the findings, diagnosis, plan and need for follow up with the patient.  (J06.9) Viral URI with cough  (primary encounter diagnosis)  Plan: COVID-19 [facility] Referral  Wants to be notified via telephone regarding strep test.   Symptomatic treatments recommended.  -Discussed that antibiotics would not help symptoms of viral URI. Education provided on symptoms of secondary bacterial infection such as new fever, chills, rigors, shortness of breath, increased work of breathing, that can occur with viral URI and need for further evaluation, if they occur.   - Ensure you are staying hydrated by drinking plenty of fluids or eating foods such as popsicles, jello, pudding.  - Honey can be soothing for sore throat  - Warm salt water gurgles can help soothe sore throat  - Rest  - Humidifier can help with congestion and help keep mucus membranes such as throat and nose from drying out.  - Sleeping slightly propped up can help with congestion and postnasal drainage that can worsen cough at bedtime.  - As long as you have never been told to take Tylenol and/or Ibuprofen you can use them to  manage fever and body aches per package instructions  Make sure you eat when you take ibuprofen to avoid stomach upset.  - OTC cough medications per package instructions to help with cough. Check to see if the cough/cold medication already has acetaminophen (Tylenol) in it. If it does avoid taking additional Tylenol.  - If sudden onset of new fever, worsening symptoms return for further evaluation.  - OTC nasal steroid such as Flonase can help decrease sinus inflammation to help with congestion.  OTC antihistamines such as Zyrtec or Claritin  - Education provided on symptoms of post-viral bacterial infections including ear infection and pneumonia. This would require re-evaluation for treatment.    Follow up with primary care provider or return to urgent care-ED with any worsening in condition or additional concerns.    New Prescriptions    No medications on file     Final diagnoses:   Viral URI with cough     9/11/2021   [facility]     Daksha Winn NP  09/11/21 1922

## 2021-09-11 NOTE — DISCHARGE INSTRUCTIONS
Symptomatic treatments recommended.  -Discussed that antibiotics would not help symptoms of viral URI. Education provided on symptoms of secondary bacterial infection such as new fever, chills, rigors, shortness of breath, increased work of breathing, that can occur with viral URI and need for further evaluation, if they occur.   - Ensure you are staying hydrated by drinking plenty of fluids or eating foods such as popsicles, jello, pudding.  - Honey can be soothing for sore throat  - Warm salt water gurgles can help soothe sore throat  - Rest  - Humidifier can help with congestion and help keep mucus membranes such as throat and nose from drying out.  - Sleeping slightly propped up can help with congestion and postnasal drainage that can worsen cough at bedtime.  - As long as you have never been told to take Tylenol and/or Ibuprofen you can use them to manage fever and body aches per package instructions  Make sure you eat when you take ibuprofen to avoid stomach upset.  - OTC cough medications per package instructions to help with cough. Check to see if the cough/cold medication already has acetaminophen (Tylenol) in it. If it does avoid taking additional Tylenol.  - If sudden onset of new fever, worsening symptoms return for further evaluation.  - OTC nasal steroid such as Flonase can help decrease sinus inflammation to help with congestion.  OTC antihistamines such as Zyrtec or Claritin  - Education provided on symptoms of post-viral bacterial infections including ear infection and pneumonia. This would require re-evaluation for treatment.    Follow up with primary care provider or return to urgent care-ED with any worsening in condition or additional concerns.

## 2021-09-28 ENCOUNTER — MYC MEDICAL ADVICE (OUTPATIENT)
Dept: FAMILY MEDICINE | Facility: OTHER | Age: 51
End: 2021-09-28

## 2021-09-28 DIAGNOSIS — M19.049 LOCALIZED OSTEOARTHRITIS OF HAND, UNSPECIFIED LATERALITY: Primary | ICD-10-CM

## 2021-10-11 ENCOUNTER — LAB (OUTPATIENT)
Dept: LAB | Facility: OTHER | Age: 51
End: 2021-10-11
Payer: COMMERCIAL

## 2021-10-11 ENCOUNTER — MYC MEDICAL ADVICE (OUTPATIENT)
Dept: FAMILY MEDICINE | Facility: OTHER | Age: 51
End: 2021-10-11

## 2021-10-11 DIAGNOSIS — R39.9 LOWER URINARY TRACT SYMPTOMS (LUTS): Primary | ICD-10-CM

## 2021-10-11 DIAGNOSIS — R39.9 LOWER URINARY TRACT SYMPTOMS (LUTS): ICD-10-CM

## 2021-10-11 LAB
ALBUMIN UR-MCNC: NEGATIVE MG/DL
APPEARANCE UR: CLEAR
BILIRUB UR QL STRIP: NEGATIVE
COLOR UR AUTO: NORMAL
GLUCOSE UR STRIP-MCNC: NEGATIVE MG/DL
HGB UR QL STRIP: NEGATIVE
KETONES UR STRIP-MCNC: NEGATIVE MG/DL
LEUKOCYTE ESTERASE UR QL STRIP: NEGATIVE
NITRATE UR QL: NEGATIVE
PH UR STRIP: 6.5 [PH] (ref 4.7–8)
SP GR UR STRIP: 1 (ref 1–1.03)
UROBILINOGEN UR STRIP-MCNC: NORMAL MG/DL

## 2021-10-11 PROCEDURE — 81003 URINALYSIS AUTO W/O SCOPE: CPT

## 2021-11-11 DIAGNOSIS — J32.9 CHRONIC CONGESTION OF PARANASAL SINUS: ICD-10-CM

## 2021-11-12 RX ORDER — FLUTICASONE PROPIONATE 50 MCG
SPRAY, SUSPENSION (ML) NASAL
Qty: 16 G | Refills: 0 | Status: SHIPPED | OUTPATIENT
Start: 2021-11-12 | End: 2021-12-13

## 2021-11-12 NOTE — TELEPHONE ENCOUNTER
Flonase      Last Written Prescription Date:  7/28/21  Last Fill Quantity: 16 g,   # refills: 0  Last Office Visit: 7/8/21  Future Office visit:       Routing refill request to provider for review/approval because:

## 2021-11-18 ENCOUNTER — OFFICE VISIT (OUTPATIENT)
Dept: FAMILY MEDICINE | Facility: OTHER | Age: 51
End: 2021-11-18
Attending: FAMILY MEDICINE
Payer: COMMERCIAL

## 2021-11-18 ENCOUNTER — TELEPHONE (OUTPATIENT)
Dept: FAMILY MEDICINE | Facility: OTHER | Age: 51
End: 2021-11-18
Payer: COMMERCIAL

## 2021-11-18 VITALS
DIASTOLIC BLOOD PRESSURE: 84 MMHG | WEIGHT: 162 LBS | TEMPERATURE: 98.1 F | RESPIRATION RATE: 18 BRPM | HEART RATE: 91 BPM | BODY MASS INDEX: 27.81 KG/M2 | SYSTOLIC BLOOD PRESSURE: 126 MMHG | OXYGEN SATURATION: 99 %

## 2021-11-18 DIAGNOSIS — J01.90 ACUTE SINUSITIS WITH SYMPTOMS > 10 DAYS: Primary | ICD-10-CM

## 2021-11-18 PROCEDURE — 99213 OFFICE O/P EST LOW 20 MIN: CPT | Performed by: FAMILY MEDICINE

## 2021-11-18 RX ORDER — SACCHAROMYCES BOULARDII 250 MG
250 CAPSULE ORAL 2 TIMES DAILY
Qty: 60 CAPSULE | Refills: 1 | Status: SHIPPED | OUTPATIENT
Start: 2021-11-18 | End: 2021-12-06

## 2021-11-18 RX ORDER — CEFDINIR 300 MG/1
300 CAPSULE ORAL 2 TIMES DAILY
Qty: 14 CAPSULE | Refills: 0 | Status: SHIPPED | OUTPATIENT
Start: 2021-11-18 | End: 2021-11-25

## 2021-11-18 NOTE — TELEPHONE ENCOUNTER
9:15 AM    Reason for Call: OVERBOOK    Patient is having the following symptoms: sinus infection  for 1 weeks.    The patient is requesting an appointment for Overbook with .    Was an appointment offered for this call? No  If yes : Appointment type              Date    Preferred method for responding to this message: Telephone Call  What is your phone number ?  [phone]    If we cannot reach you directly, may we leave a detailed response at the number you provided? Yes    Can this message wait until your PCP/provider returns, if unavailable today? YES, Provider is in today    Lupe Melvin

## 2021-11-18 NOTE — PATIENT INSTRUCTIONS
Complete antibiotic - Omnicef x 7 days.  Flonase daily.  Limit nasal decongestant to 3 days in a row.  Sinus saline rinses.  Follow up if not improving.  Florastor daily.

## 2021-11-18 NOTE — NURSING NOTE
"Chief Complaint   Patient presents with     Sinus Problem       Initial /84 (BP Location: Right arm, Patient Position: Sitting, Cuff Size: Adult Regular)   Pulse 91   Temp 98.1  F (36.7  C) (Tympanic)   Resp 18   Wt 73.5 kg (162 lb)   LMP 04/11/2016 (LMP Unknown)   SpO2 99%   BMI 27.81 kg/m   Estimated body mass index is 27.81 kg/m  as calculated from the following:    Height as of 7/22/21: 1.626 m (5' 4\").    Weight as of this encounter: 73.5 kg (162 lb).  Medication Reconciliation: complete  Tata Graham LPN  "

## 2021-11-18 NOTE — PROGRESS NOTES
"  Assessment & Plan     Acute sinusitis with symptoms > 10 days  Given duration treat with antibiotic.  Multiple allergies/intolerance/side effects with antibiotics. See above.  Complete Omnicef course.  Yogurt, probiotic, fluids.  Sinus rinses and nasal steroid.  - cefdinir (OMNICEF) 300 MG capsule; Take 1 capsule (300 mg) by mouth 2 times daily for 7 days  - saccharomyces boulardii (FLORASTOR) 250 MG capsule; Take 1 capsule (250 mg) by mouth 2 times daily       BMI:   Estimated body mass index is 27.81 kg/m  as calculated from the following:    Height as of 7/22/21: 1.626 m (5' 4\").    Weight as of this encounter: 73.5 kg (162 lb).       Patient Instructions   Complete antibiotic - Omnicef x 7 days.  Flonase daily.  Limit nasal decongestant to 3 days in a row.  Sinus saline rinses.  Follow up if not improving.  Florastor daily.      No follow-ups on file.    Jenelle Rivera MD  [facility] - JOHANA Martin is a 51 year old who presents for the following health issues     HPI     Acute Illness  Acute illness concerns: sinus pain and congestion  Onset/Duration: 1 month - worse this past week; some blood nasally; mostly left maxillary and frontal  Symptoms:  Fever: no  Chills/Sweats: no  Headache (location?): YES  Sinus Pressure: YES  Conjunctivitis:  no  Ear Pain: YES: left - pressure; no drainage  Rhinorrhea: no  Congestion: YES  Sore Throat: no  Cough: YES - occasionally productive cough with green sputum  Wheeze: no  Decreased Appetite: no  Nausea: no  Vomiting: no  Diarrhea: no  Dysuria/Freq.: no  Dysuria or Hematuria: no  Fatigue/Achiness: no  Sick/Strep Exposure: no  Therapies tried and outcome: mucinex  No sinus rinses - did try for a while.  Tried mucines sinus spray for 3 days.  Has Flonase at home.    Allergies - Augmentin, Doxycycline, Ciprofloxain, Bactrim.    Last antibiotic Augmentin - 5/2021.    Review of Systems   Constitutional, HEENT, cardiovascular, pulmonary, gi and " gu systems are negative, except as otherwise noted.      Objective    /84 (BP Location: Right arm, Patient Position: Sitting, Cuff Size: Adult Regular)   Pulse 91   Temp 98.1  F (36.7  C) (Tympanic)   Resp 18   Wt 73.5 kg (162 lb)   LMP 04/11/2016 (LMP Unknown)   SpO2 99%   BMI 27.81 kg/m    Body mass index is 27.81 kg/m .  Physical Exam   GENERAL: healthy, alert and no distress  EYES: Eyes grossly normal to inspection, PERRL and conjunctivae and sclerae normal  HENT: normal cephalic/atraumatic, ear canals and TM's normal, nasal mucosa edematous , oropharynx clear, oral mucous membranes moist and sinuses: maxillary, frontal tenderness on left  NECK: no adenopathy, no asymmetry, masses, or scars and thyroid normal to palpation  RESP: lungs clear to auscultation - no rales, rhonchi or wheezes  CV: regular rate and rhythm, normal S1 S2, no S3 or S4, no murmur, click or rub, no peripheral edema and peripheral pulses strong  MS: no gross musculoskeletal defects noted, no edema  PSYCH: mentation appears normal, affect normal/bright

## 2021-11-19 ENCOUNTER — MYC MEDICAL ADVICE (OUTPATIENT)
Dept: FAMILY MEDICINE | Facility: OTHER | Age: 51
End: 2021-11-19
Payer: COMMERCIAL

## 2021-11-29 ENCOUNTER — TRANSFERRED RECORDS (OUTPATIENT)
Dept: HEALTH INFORMATION MANAGEMENT | Facility: CLINIC | Age: 51
End: 2021-11-29

## 2021-11-29 LAB
CREATININE (EXTERNAL): 0.65 MG/DL (ref 0.4–1)
GFR ESTIMATED (EXTERNAL): >60 ML/MIN/1.73M2
GLUCOSE (EXTERNAL): 94 MG/DL (ref 70–99)
POTASSIUM (EXTERNAL): 3.8 MEQ/L (ref 3.4–5.1)

## 2021-11-30 PROBLEM — Z98.890 OTHER SPECIFIED POSTPROCEDURAL STATES: Status: ACTIVE | Noted: 2018-04-02

## 2021-11-30 PROBLEM — K82.8 OTHER SPECIFIED DISEASES OF GALLBLADDER: Status: ACTIVE | Noted: 2018-11-14

## 2021-12-03 NOTE — PROGRESS NOTES
Otolaryngology Progress Note    Patient: Veronica Ansari  : 1970    Patient presents with:  Follow Up: Deviated Nasal Septum, Nasal Valve Collapse, Nasal Turbinate Hypertrophy, Acute Nasopharyngitis      HPI:  Veronica Ansari is a 51 year old female seen today for Acute sinusitis.  I reviewed Dr. Ruiz's note on 111 she was started on Omnicef for 7 days, nasal decongestant for 3 days and saline rinses.    She took one dose of the omnicef and stopped b/c it didn't agree with her  She has not been using freedom med, did not seem to help  Using flonse     SNOT 26 with severe left congestion and left facial pressure which has resolved    Since that time.    Has multiple allergies to Augmentin, doxycycline, Cipro and Bactrim.    CT sinus dated 3/3/2020 was reviewed  Sinuses are clear nasopharynx is clear cobblestoning of the inferior turbinates bilaterally with right milton and septal deviation to the left with contact of the mid septum    no tobacco use    She does have a history of migraine     no prior sinus surgery or nasal surgery    Significant distant nasal trauma as a child without prior surgery    I saw Veronica on 10/6/2020 for bilateral chronic congestion and rhinorrhea as well as postnasal drainage    I noted tip asymmetry at the right upper lateral cartilage with septal deviation to the left    Findings 10/6 exam    I discussed referral to Dr. Aiken for septorhinoplasty was she was to continue NeilMed saline    She did see Dr. Aiken but was concerned about having an external scar and felt that the surgery was more complicated than what she had intended and is here to discuss surgery.       Current Outpatient Rx   Medication Sig Dispense Refill     fluticasone (FLONASE) 50 MCG/ACT nasal spray SHAKE LIQUID AND USE 2 SPRAYS IN EACH NOSTRIL DAILY 16 g 0     omeprazole (PRILOSEC) 40 MG DR capsule TAKE 1 CAPSULE(40 MG) BY MOUTH DAILY 90 capsule 3       Allergies: Augmentin, Ciprofloxacin, Doxycycline, Flagyl  "[metronidazole], Food, and Sulfamethoxazole w/trimethoprim     Past Medical History:   Diagnosis Date     Cervicalgia      Deviated nasal septum      Embolism and thrombosis (H)     unspecified site     Fibrocystic breast disease      Migraine      Prothrombin mutation (H)      Sinusitis        Past Surgical History:   Procedure Laterality Date     ABDOMINOPLASTY       C BREAST AUGMENTATION       C LAP CHOLECYSTOSOMY        SECTION      x2     COLONOSCOPY - HIM SCAN  2011    Colonoscopy 2011     COLONOSCOPY - HIM SCAN  2018    Procedure:  Colonoscopy diagnostic with polypectomy and biopsies;  Surgeon:  Danelle Swift MD;  Location:  [facility] OR     COLONOSCOPY - HIM SCAN  2007    Colonoscopy, flex, diagnostic     DILATE CERVIX, HYSTEROSCOPY, ABLATE ENDOMETRIUM, COMBINED N/A 2016    Procedure: COMBINED DILATE CERVIX, HYSTEROSCOPY, ABLATE ENDOMETRIUM;  Surgeon: Pacheco Curry MD;  Location: [facility] OR     ESOPHAGOGASTRODUODENOSCOPY      negative     ESOPHAGOSCOPY, GASTROSCOPY, DUODENOSCOPY (EGD), COMBINED N/A 2020    Procedure: Upper Endoscopy with biopsy;  Surgeon: Son Cooper MD;  Location: [facility] OR     ORTHOPEDIC SURGERY Left     rotator cuff repair and bicep tendon repair       ENT family history reviewed    Social History     Tobacco Use     Smoking status: Former Smoker     Years: 3.00     Types: Cigarettes     Quit date:      Years since quittin.9     Smokeless tobacco: Never Used   Substance Use Topics     Alcohol use: Yes     Alcohol/week: 0.0 standard drinks     Comment: Occassional     Drug use: No       Review of Systems  ROS: 10 point ROS neg other than the symptoms noted above in the HPI and occasional eye pain recurrent nasal infections joint pain dry skin    Physical Exam  /76 (Cuff Size: Adult Regular)   Pulse 70   Temp 97.6  F (36.4  C) (Tympanic)   Ht 1.626 m (5' 4\")   Wt 75.8 kg (167 lb)   LMP 2016 (LMP Unknown)   SpO2 98%   BMI " 28.67 kg/m    General - The patient is well nourished and well developed, and appears to have good nutritional status.  Alert and oriented to person and place, answers questions and cooperates with examination appropriately.   Head and Face - Normocephalic and atraumatic, with no gross asymmetry noted.  The facial nerve is intact, with strong symmetric movements.  Voice and Breathing - The patient was breathing comfortably without the use of accessory muscles. There was no wheezing, stridor, or stertor.  The patients voice was clear and strong, and had appropriate pitch and quality.  No bakari peripheral digital clubbing or cyanosis   Eyes - Extraocular movements intact, and the pupils were reactive to light.  Sclera were not icteric or injected, conjunctiva were pink and moist.  Mouth - Examination of the oral cavity showed pink, healthy oral mucosa.   Throat - The walls of the oropharynx were smooth, pink, moist, symmetric, and had no lesions or ulcerations.  The tonsillar pillars and soft palate were symmetric.  The uvula was midline on elevation.  Suarez Palate Position III  Nose -asymmetry at the nasal tip at right ULC.   Nasal mucosa is pink and moist with no abnormal mucus.  The septum was deviated left, 80% obstruction and septal contact floor through mid septum,  turbinates enlarged.  No polyps, masses, or purulence noted on examination.  Bilateral internal and external nasal valve collapse improved with modified William maneuver    To evaluate the nose and sinuses, I performed rigid nasal endoscopy.  I applied topical nasal lidocaine and neosynephrine.    I began with the LEFT side using a 0 degree rigid nasal endoscope, and then similarly examined the RIGHT side    Findings:  Inferior turbinates:  enlarged  Middle turbinate and middle meatus:  No purulence, no polyposis, milton noted  Superior meatus was evaluated  Mucosa is healthy throughout,  no polyps nor polypoid degeneration  Sphenoethmoidal recess  without purulence   Nasopharynx clear  The patient tolerated the procedure well          Impression and Plan- Veronica Ansari is a 51 year old female with:    ICD-10-CM    1. Deviated nasal septum  J34.2 budesonide (PULMICORT) 0.5 MG/2ML neb solution   2. Nasal turbinate hypertrophy  J34.3    3. Nasal valve collapse  J34.89    4. Milton bullosa  J34.89            Repeat CT sinus  was discussed.  She declines CT    Risks and complications of septoplasty, bilateral turbinate reduction, nasal valve repair were discussed include general anesthesia, bleeding, infection, septal perforation, anosmia, epiphora, CSF rhinorrhea, injury to the eye, injury to the brain, atrophic rhinitis, scar formation, numbness over the incision, need for additional surgery and no guarantee is made that the septum will be completely straight.  Implants may extrude. Understanding is expressed, all questions are answered and wishes are to proceed with surgical intervention.    Surgery does not address her crooked nasal tip.  She understands the nasal tip will still look asymmetric    Photos taken      Endoscopic septo  Tract septo balloon  Excision milton  Scopes/towers    Start budesonide irrigations  Continue flonase          Farrah Richardson D.O.  Otolaryngology/Head and Neck Surgery  Allergy

## 2021-12-06 ENCOUNTER — HOSPITAL ENCOUNTER (OUTPATIENT)
Facility: HOSPITAL | Age: 51
End: 2021-12-06
Attending: OTOLARYNGOLOGY | Admitting: OTOLARYNGOLOGY
Payer: COMMERCIAL

## 2021-12-06 ENCOUNTER — OFFICE VISIT (OUTPATIENT)
Dept: OTOLARYNGOLOGY | Facility: OTHER | Age: 51
End: 2021-12-06
Attending: OTOLARYNGOLOGY
Payer: COMMERCIAL

## 2021-12-06 ENCOUNTER — PREP FOR PROCEDURE (OUTPATIENT)
Dept: OTOLARYNGOLOGY | Facility: OTHER | Age: 51
End: 2021-12-06

## 2021-12-06 VITALS
TEMPERATURE: 97.6 F | HEART RATE: 70 BPM | OXYGEN SATURATION: 98 % | DIASTOLIC BLOOD PRESSURE: 76 MMHG | HEIGHT: 64 IN | WEIGHT: 167 LBS | SYSTOLIC BLOOD PRESSURE: 132 MMHG | BODY MASS INDEX: 28.51 KG/M2

## 2021-12-06 DIAGNOSIS — J34.829 NASAL VALVE COLLAPSE: ICD-10-CM

## 2021-12-06 DIAGNOSIS — J34.2 DEVIATED NASAL SEPTUM: Primary | ICD-10-CM

## 2021-12-06 DIAGNOSIS — J34.89 CONCHA BULLOSA: ICD-10-CM

## 2021-12-06 DIAGNOSIS — J34.3 NASAL TURBINATE HYPERTROPHY: ICD-10-CM

## 2021-12-06 DIAGNOSIS — Z01.818 PRE-OP TESTING: ICD-10-CM

## 2021-12-06 PROCEDURE — 99214 OFFICE O/P EST MOD 30 MIN: CPT | Mod: 25 | Performed by: OTOLARYNGOLOGY

## 2021-12-06 PROCEDURE — 31231 NASAL ENDOSCOPY DX: CPT | Performed by: OTOLARYNGOLOGY

## 2021-12-06 RX ORDER — BUDESONIDE 0.5 MG/2ML
INHALANT ORAL
Qty: 200 ML | Refills: 11 | Status: SHIPPED | OUTPATIENT
Start: 2021-12-06 | End: 2023-09-25

## 2021-12-06 ASSESSMENT — MIFFLIN-ST. JEOR: SCORE: 1347.51

## 2021-12-06 ASSESSMENT — PAIN SCALES - GENERAL: PAINLEVEL: NO PAIN (0)

## 2021-12-06 NOTE — LETTER
2021         RE: Veronica Ansari  [address]  [place] [address]        Dear Colleague,    Thank you for referring your patient, Veronica Ansari, to the [facility]. Please see a copy of my visit note below.    Otolaryngology Progress Note    Patient: Veronica Ansari  : 1970    Patient presents with:  Follow Up: Deviated Nasal Septum, Nasal Valve Collapse, Nasal Turbinate Hypertrophy, Acute Nasopharyngitis      HPI:  Veronica Ansari is a 51 year old female seen today for Acute sinusitis.  I reviewed Dr. Ruiz's note on  she was started on Omnicef for 7 days, nasal decongestant for 3 days and saline rinses.    She took one dose of the omnicef and stopped b/c it didn't agree with her  She has not been using freedom med, did not seem to help  Using flonse     SNOT 26 with severe left congestion and left facial pressure which has resolved    Since that time.    Has multiple allergies to Augmentin, doxycycline, Cipro and Bactrim.    CT sinus dated 3/3/2020 was reviewed  Sinuses are clear nasopharynx is clear cobblestoning of the inferior turbinates bilaterally with right milton and septal deviation to the left with contact of the mid septum    no tobacco use    She does have a history of migraine     no prior sinus surgery or nasal surgery    Significant distant nasal trauma as a child without prior surgery    I saw Veronica on 10/6/2020 for bilateral chronic congestion and rhinorrhea as well as postnasal drainage    I noted tip asymmetry at the right upper lateral cartilage with septal deviation to the left    Findings 10/6 exam    I discussed referral to Dr. Aiken for septorhinoplasty was she was to continue NeilMed saline    She did see Dr. Aiken but was concerned about having an external scar and felt that the surgery was more complicated than what she had intended and is here to discuss surgery.       Current Outpatient Rx   Medication Sig Dispense Refill     fluticasone  (FLONASE) 50 MCG/ACT nasal spray SHAKE LIQUID AND USE 2 SPRAYS IN EACH NOSTRIL DAILY 16 g 0     omeprazole (PRILOSEC) 40 MG DR capsule TAKE 1 CAPSULE(40 MG) BY MOUTH DAILY 90 capsule 3       Allergies: Augmentin, Ciprofloxacin, Doxycycline, Flagyl [metronidazole], Food, and Sulfamethoxazole w/trimethoprim     Past Medical History:   Diagnosis Date     Cervicalgia      Deviated nasal septum      Embolism and thrombosis (H)     unspecified site     Fibrocystic breast disease      Migraine      Prothrombin mutation (H)      Sinusitis        Past Surgical History:   Procedure Laterality Date     ABDOMINOPLASTY       C BREAST AUGMENTATION       C LAP CHOLECYSTOSOMY        SECTION      x2     COLONOSCOPY - HIM SCAN  2011    Colonoscopy 2011     COLONOSCOPY - HIM SCAN  2018    Procedure:  Colonoscopy diagnostic with polypectomy and biopsies;  Surgeon:  Danelle Swift MD;  Location:  [facility] OR     COLONOSCOPY - HIM SCAN  2007    Colonoscopy, flex, diagnostic     DILATE CERVIX, HYSTEROSCOPY, ABLATE ENDOMETRIUM, COMBINED N/A 2016    Procedure: COMBINED DILATE CERVIX, HYSTEROSCOPY, ABLATE ENDOMETRIUM;  Surgeon: Pacheco Curry MD;  Location: [facility] OR     ESOPHAGOGASTRODUODENOSCOPY      negative     ESOPHAGOSCOPY, GASTROSCOPY, DUODENOSCOPY (EGD), COMBINED N/A 2020    Procedure: Upper Endoscopy with biopsy;  Surgeon: Son Cooper MD;  Location: [facility] OR     ORTHOPEDIC SURGERY Left     rotator cuff repair and bicep tendon repair       ENT family history reviewed    Social History     Tobacco Use     Smoking status: Former Smoker     Years: 3.00     Types: Cigarettes     Quit date:      Years since quittin.9     Smokeless tobacco: Never Used   Substance Use Topics     Alcohol use: Yes     Alcohol/week: 0.0 standard drinks     Comment: Occassional     Drug use: No       Review of Systems  ROS: 10 point ROS neg other than the symptoms noted above in the HPI and occasional eye pain  "recurrent nasal infections joint pain dry skin    Physical Exam  /76 (Cuff Size: Adult Regular)   Pulse 70   Temp 97.6  F (36.4  C) (Tympanic)   Ht 1.626 m (5' 4\")   Wt 75.8 kg (167 lb)   LMP 04/11/2016 (LMP Unknown)   SpO2 98%   BMI 28.67 kg/m    General - The patient is well nourished and well developed, and appears to have good nutritional status.  Alert and oriented to person and place, answers questions and cooperates with examination appropriately.   Head and Face - Normocephalic and atraumatic, with no gross asymmetry noted.  The facial nerve is intact, with strong symmetric movements.  Voice and Breathing - The patient was breathing comfortably without the use of accessory muscles. There was no wheezing, stridor, or stertor.  The patients voice was clear and strong, and had appropriate pitch and quality.  No bakari peripheral digital clubbing or cyanosis   Eyes - Extraocular movements intact, and the pupils were reactive to light.  Sclera were not icteric or injected, conjunctiva were pink and moist.  Mouth - Examination of the oral cavity showed pink, healthy oral mucosa.   Throat - The walls of the oropharynx were smooth, pink, moist, symmetric, and had no lesions or ulcerations.  The tonsillar pillars and soft palate were symmetric.  The uvula was midline on elevation.  Suarez Palate Position III  Nose -asymmetry at the nasal tip at right ULC.   Nasal mucosa is pink and moist with no abnormal mucus.  The septum was deviated left, 80% obstruction and septal contact floor through mid septum,  turbinates enlarged.  No polyps, masses, or purulence noted on examination.  Bilateral internal and external nasal valve collapse improved with modified Orleans maneuver    To evaluate the nose and sinuses, I performed rigid nasal endoscopy.  I applied topical nasal lidocaine and neosynephrine.    I began with the LEFT side using a 0 degree rigid nasal endoscope, and then similarly examined the RIGHT " side    Findings:  Inferior turbinates:  enlarged  Middle turbinate and middle meatus:  No purulence, no polyposis, milton noted  Superior meatus was evaluated  Mucosa is healthy throughout,  no polyps nor polypoid degeneration  Sphenoethmoidal recess without purulence   Nasopharynx clear  The patient tolerated the procedure well          Impression and Plan- Veronica Ansari is a 51 year old female with:    ICD-10-CM    1. Deviated nasal septum  J34.2 budesonide (PULMICORT) 0.5 MG/2ML neb solution   2. Nasal turbinate hypertrophy  J34.3    3. Nasal valve collapse  J34.89    4. Milton bullosa  J34.89            Repeat CT sinus  was discussed.  She declines CT    Risks and complications of septoplasty, bilateral turbinate reduction, nasal valve repair were discussed include general anesthesia, bleeding, infection, septal perforation, anosmia, epiphora, CSF rhinorrhea, injury to the eye, injury to the brain, atrophic rhinitis, scar formation, numbness over the incision, need for additional surgery and no guarantee is made that the septum will be completely straight.  Implants may extrude. Understanding is expressed, all questions are answered and wishes are to proceed with surgical intervention.    Surgery does not address her crooked nasal tip.  She understands the nasal tip will still look asymmetric    Photos taken      Endoscopic septo  Tract septo balloon  Excision milton  Scopes/towers    Start budesonide irrigations  Continue flonase          Farrah Richardson D.O.  Otolaryngology/Head and Neck Surgery  Allergy      Again, thank you for allowing me to participate in the care of your patient.        Sincerely,        Farrah Richardson MD

## 2021-12-06 NOTE — NURSING NOTE
"Chief Complaint   Patient presents with     Follow Up     Deviated Nasal Septum, Nasal Valve Collapse, Nasal Turbinate Hypertrophy, Acute Nasopharyngitis       Initial /76 (Cuff Size: Adult Regular)   Pulse 70   Temp 97.6  F (36.4  C) (Tympanic)   Ht 1.626 m (5' 4\")   Wt 75.8 kg (167 lb)   LMP 04/11/2016 (LMP Unknown)   SpO2 98%   BMI 28.67 kg/m   Estimated body mass index is 28.67 kg/m  as calculated from the following:    Height as of this encounter: 1.626 m (5' 4\").    Weight as of this encounter: 75.8 kg (167 lb).  Medication Reconciliation: complete  Mireya Rivera LPN    "

## 2021-12-06 NOTE — PATIENT INSTRUCTIONS
Thank you for allowing Dr. Richardson and our ENT team to participate in your care.  If your medications are too expensive, please give the nurse a call.  We can possibly change this medication.  If you have a scheduling or an appointment question please contact our Health Unit Coordinator at their direct line [phone] ext [phone].   ALL nursing questions or concerns can be directed to your ENT nurse at: [phone] Genoveva Mireya    Use Budesonide Rinses Twice Daily  Covid Test 4 days before surgery  Follow up in surgery        Budesonide nasal saline irrigation per instructions:  -Obtain Leif Med Sinus rinse over the counter.    -Use warm distilled water and 2 packets of the salt solution that comes with the bottle, dissolve in bottle up to the 240 mL garrett.  -Add 1 vial of budesonide.  -Irrigate each side of your nose leaning over the sink, using 1/3 to 1/2 the volume of the bottle in each nostril every irrigation.  Irrigate 2 times daily.  -If additional rinses are needed/recommended, you may use the plan Leif Med Sinus irrigation without the use of added budesonide.       Instructions for Sinus Surgery    Recovery - Everyone recovers differently from a general anesthetic.  Symptoms such as fatigue, nausea, light-headedness, and sometimes a low grade fever (up to 100 degrees) are not unusual.  As your body removes the anesthetic drugs from circulation, these symptoms will resolve.  Your nose will be sore after surgery, and you may even have symptoms similar to a sinus infection with headache, congestion, and pressure.  These will resolve with healing.  For several days you may experience bloody drainage from the nose, please use the drip pad as necessary for this.  If there is persistent bleeding, please call the office during business hours or the on call ENT physician after hours.  There are no diet restrictions after sinus surgery, and you can resume your home medications.      Please do not blow your nose until 1  week after surgery.  At 1 week you may gently blow your nose, unless otherwise indicated by Dr. Richardson.     Limit your activity to no strenuous activities until I see you for the first follow-up visit in approximately 2 weeks.      Medications - You were sent home with narcotic pain medication.  If you can tolerate the discomfort during your recovery by using just plain Tylenol or ibuprofen (advil), please do so.  However, do not hesitate to use the stronger pain medication if needed.  If you were sent home with an antibiotic, it is primarily used to help the healing process.  If it causes loose bowel movements or other signs of intolerance, it is appropriate to discontinue it.  By far the most important measure you can take to speed recovery, and maximize the chances of long term success of sinus surgery is using the sinus rinses at least three time per day for the first month after surgery.       Start Freedom Med saline irrigation tomorrow and use at least 5 times daily.     I recommend 2 freedom med bottles, one to add the budesonide to and irrigate with the budesonide rinse twice a day for 2 months.    In the other freedom med bottle use only the saline solution, and irrigate with this at least 3 additional times daily.    Perform gentle irrigation for the first week.  Starting 1 week after surgery, you should increase the volume of freedom med saline irrigation to each nostril, continuing to use the rinses in an alternating fashion at least 5 times daily.  You cannot use too much of the freedom med saline, but please limit budesonide rinses to twice daily.    At 2 weeks after surgery, you may also restart nasal steroids (flonase, nasonex, etc).        Complications - Problems related to sinus surgery almost always are detected during the operation, and special instruction will be given in that situation.  However, unexpected things can happen, and are all related to the structures around the sinus cavities.  Symptoms  that should alert you to a possible problem include: severe eye pain or eye swelling, persistent heavy bleeding from the nose, and high fevers with headache and neck pain.  Any of these symptoms should be called into my office or to the on call ENT if after hours.  The most common non-emergency complication of sinus surgery is the formation of scar tissue which can re-block the sinuses.  This is addressed below.    Follow-up -  As you have noted, there are quite a few follow-up visits after sinus surgery.  This is done to aggressively manage the most common complication of this technique, which is scar tissue blocking the sinuses.  These visits will require the examination of your nose and possibly removal of crusts of dry mucous and blood, with possible removal of early scar tissue.  Please prepare for these visits by using your sinus rinses.    If there are any questions or issues with the above, or if there are other issues that concern you, always feel free to call the clinic and I am happy to speak with you as soon as I can.    Farrah Richardson D.O.  Otolaryngology/Head and Neck Surgery  Allergy    [phone]   extension [phone]            HOW TO PREPARE-      You need to have a scheduled Pre-Op with your primary care physician within 30 days of your scheduled surgery.        You need a friend or family member available to drive you home AND stay with you for 24 hours after you leave the hospital. You will not be allowed to drive yourself. IF you need to take a taxi or the bus you MUST have a responsible person to ride with you. YOUR PROCEDURE WILL BE CANCELLED IF YOU DO NOT HAVE A RESPONSIBLE ADULT TO DRIVE YOU HOME.       You CANNOT have anything to eat or drink after midnight the night before your surgery, including water and coffee. Your stomach needs to be completely empty. Do NOT chew gum, suck on hard candy, or smoke. You can brush your teeth the morning of surgery.       The Surgery Education  Nurses will call you  1-2 weeks prior to your surgery date at  [phone] or toll free [phone]. Please have your medication and allergy lists ready.      Stop your aspirin or other NSAIDs(Ibuprofen, Motrin, Aleve, Celebrex, Naproxen, etc...) 7 days before your surgery.      Hospital admitting will call you the day before your surgery with your exact arrival time.       Please call your primary care physician if you should become ill within 24 hours of scheduled surgery. (ex.vomiting, diarrhea, fever)          You will need to wash the night before AND the morning of you procedure with a liquid antibacterial soap, like Dial.

## 2021-12-11 DIAGNOSIS — J32.9 CHRONIC CONGESTION OF PARANASAL SINUS: ICD-10-CM

## 2021-12-11 NOTE — TELEPHONE ENCOUNTER
FLONASE      Last Written Prescription Date:  11-12-21  Last Fill Quantity: 1,   # refills: 0  Last Office Visit: 12-6-2021  Future Office visit:    Next 5 appointments (look out 90 days)    Dec 20, 2021 10:40 AM  (Arrive by 10:25 AM)  Pre-Op physical with MAHAD Aguirre CNP  [facility] [place] ([facility] [place] ) [address]  [place] [address]  [phone]   Dec 25, 2021  9:00 AM  (Arrive by 8:45 AM)  SHORT with HC COLLECTION  [facility] ([facility] [place] ) [address]  [place] [address]  [phone]           Routing refill request to provider for review/approval because:

## 2021-12-13 ENCOUNTER — TELEPHONE (OUTPATIENT)
Dept: OTOLARYNGOLOGY | Facility: OTHER | Age: 51
End: 2021-12-13
Payer: COMMERCIAL

## 2021-12-13 RX ORDER — FLUTICASONE PROPIONATE 50 MCG
SPRAY, SUSPENSION (ML) NASAL
Qty: 16 G | Refills: 0 | Status: SHIPPED | OUTPATIENT
Start: 2021-12-13 | End: 2022-01-10

## 2021-12-13 NOTE — TELEPHONE ENCOUNTER
This patient calls and wishes to cancel her upcoming surgery with Dr. Richardson. She will call back if she decides to reschedule in the future. Pre op, post op and covid test all cancelled as well. Surgery notified.

## 2021-12-20 DIAGNOSIS — Z12.31 VISIT FOR SCREENING MAMMOGRAM: Primary | ICD-10-CM

## 2021-12-22 ENCOUNTER — TELEPHONE (OUTPATIENT)
Dept: FAMILY MEDICINE | Facility: OTHER | Age: 51
End: 2021-12-22
Payer: COMMERCIAL

## 2021-12-22 NOTE — TELEPHONE ENCOUNTER
Call from patient requesting covid 19 testing for possible exposure to covid 19. Family members in the home are symptomatic, currently being tested, have not received results. Patient is asymptomatic.     Notified patient if the family members results come back positive this will be an exposure, may test patient at that time, and/or if patient becomes symptomatic.     Patient verbalized understanding.

## 2021-12-26 ENCOUNTER — HEALTH MAINTENANCE LETTER (OUTPATIENT)
Age: 51
End: 2021-12-26

## 2022-01-09 DIAGNOSIS — J32.9 CHRONIC CONGESTION OF PARANASAL SINUS: ICD-10-CM

## 2022-01-10 RX ORDER — FLUTICASONE PROPIONATE 50 MCG
SPRAY, SUSPENSION (ML) NASAL
Qty: 16 G | Refills: 0 | Status: SHIPPED | OUTPATIENT
Start: 2022-01-10 | End: 2022-08-31

## 2022-01-10 NOTE — TELEPHONE ENCOUNTER
Flonase  Last Written Prescription Date: 12/13/21  Last Fill Quantity: 16 g # of Refills: 0  Last Office Visit: 11/18/21

## 2022-01-17 ENCOUNTER — OFFICE VISIT (OUTPATIENT)
Dept: PODIATRY | Facility: OTHER | Age: 52
End: 2022-01-17
Attending: PODIATRIST
Payer: COMMERCIAL

## 2022-01-17 VITALS
OXYGEN SATURATION: 98 % | HEART RATE: 77 BPM | TEMPERATURE: 97.7 F | SYSTOLIC BLOOD PRESSURE: 122 MMHG | DIASTOLIC BLOOD PRESSURE: 85 MMHG

## 2022-01-17 DIAGNOSIS — M62.461 GASTROCNEMIUS EQUINUS OF RIGHT LOWER EXTREMITY: ICD-10-CM

## 2022-01-17 DIAGNOSIS — M20.22 HALLUX RIGIDUS OF LEFT FOOT: Primary | ICD-10-CM

## 2022-01-17 DIAGNOSIS — M21.41 PES PLANUS OF BOTH FEET: ICD-10-CM

## 2022-01-17 DIAGNOSIS — L84 CALLUS OF FOOT: ICD-10-CM

## 2022-01-17 DIAGNOSIS — M62.462 GASTROCNEMIUS EQUINUS OF LEFT LOWER EXTREMITY: ICD-10-CM

## 2022-01-17 DIAGNOSIS — M20.5X2 ACQUIRED MALLET TOE OF LEFT FOOT: ICD-10-CM

## 2022-01-17 DIAGNOSIS — S92.351K CLOSED DISPLACED FRACTURE OF FIFTH METATARSAL BONE OF RIGHT FOOT WITH NONUNION: ICD-10-CM

## 2022-01-17 DIAGNOSIS — M79.672 LEFT FOOT PAIN: ICD-10-CM

## 2022-01-17 DIAGNOSIS — M21.42 PES PLANUS OF BOTH FEET: ICD-10-CM

## 2022-01-17 PROCEDURE — 20600 DRAIN/INJ JOINT/BURSA W/O US: CPT | Mod: LT | Performed by: PODIATRIST

## 2022-01-17 RX ORDER — TRIAMCINOLONE ACETONIDE 40 MG/ML
20 INJECTION, SUSPENSION INTRA-ARTICULAR; INTRAMUSCULAR ONCE
Status: COMPLETED | OUTPATIENT
Start: 2022-01-17 | End: 2022-01-17

## 2022-01-17 RX ADMIN — TRIAMCINOLONE ACETONIDE 20 MG: 40 INJECTION, SUSPENSION INTRA-ARTICULAR; INTRAMUSCULAR at 14:43

## 2022-01-17 ASSESSMENT — PAIN SCALES - GENERAL: PAINLEVEL: MODERATE PAIN (4)

## 2022-01-17 NOTE — PATIENT INSTRUCTIONS
Consider calling [facility] to make an appointment with the orthotist, Ralph Lance. He takes appointments on Thursdays. Let him know you have pain in the LEFT first metatarsophalangeal joint. You need an orthotic with a hernandez's extension on the LEFT foot.

## 2022-01-17 NOTE — PROGRESS NOTES
Clinic Administered Medication Documentation    Administrations This Visit     triamcinolone (KENALOG-40) injection 20 mg     Admin Date  01/17/2022 Action  Given Dose  20 mg Route  INTRA-ARTICULAR Site   Administered By  Arianna Jenkins LPN    Ordering Provider: Nya Bui DPM    Patient Supplied?: No    Comments: 0.5ml/20mg of 40 mg vial wasted                Steroid injection of the left foot first metatarsophalangeal joint

## 2022-01-17 NOTE — NURSING NOTE
"Chief Complaint   Patient presents with     Musculoskeletal Problem     foot pain       Initial /85 (BP Location: Left arm, Patient Position: Sitting, Cuff Size: Adult Regular)   Pulse 77   Temp 97.7  F (36.5  C) (Tympanic)   LMP 04/11/2016 (LMP Unknown)   SpO2 98%  Estimated body mass index is 28.67 kg/m  as calculated from the following:    Height as of 12/6/21: 1.626 m (5' 4\").    Weight as of 12/6/21: 75.8 kg (167 lb).  Medication Reconciliation: complete  Cassie Brenner  "

## 2022-01-17 NOTE — PROGRESS NOTES
Chief complaint: Patient presents with:  Musculoskeletal Problem: foot pain      History of Present Illness: This 51 year old female is seen for pain of the LEFT 1st MTPJ. She thinks the injection in , lasted until around . She had another injection at [facility] in , but it didn't seem to be very effective. She is looking to have another injection today.    She has not tried orthotics in the past.    No further pedal complaints today.     Patient does not use tobacco products.         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)     S/P abdominoplasty     Irritable bowel syndrome with diarrhea     Iron deficiency     H/O deep venous thrombosis     Cervicalgia     Deviated nasal septum     Fibrocystic breast changes     Ulcer of esophagus     Biliary dyskinesia     Diverticulitis     Colon cancer screening     Prolapsed internal hemorrhoids     Migraine     Other specified postprocedural states     Pain of foot     Arthralgia of hip     Personal history of other venous thrombosis and embolism     Shoulder pain     Other specified diseases of gallbladder       Past Surgical History:   Procedure Laterality Date     ABDOMINOPLASTY        SECTION      x2     COLONOSCOPY - HIM SCAN  2011    Colonoscopy 2011     COLONOSCOPY - HIM SCAN  2018    Procedure:  Colonoscopy diagnostic with polypectomy and biopsies;  Surgeon:  Danelle Swift MD;  Location:  [facility] OR     COLONOSCOPY - HIM SCAN  2007    Colonoscopy, flex, diagnostic     DILATE CERVIX, HYSTEROSCOPY, ABLATE ENDOMETRIUM, COMBINED N/A 2016    Procedure: COMBINED DILATE CERVIX, HYSTEROSCOPY, ABLATE ENDOMETRIUM;  Surgeon: Pacheco Curry,  MD;  Location: [facility] OR     ESOPHAGOGASTRODUODENOSCOPY      negative     ESOPHAGOSCOPY, GASTROSCOPY, DUODENOSCOPY (EGD), COMBINED N/A 2020    Procedure: Upper Endoscopy with biopsy;  Surgeon: Son Cooper MD;  Location: [facility] OR     ORTHOPEDIC SURGERY Left     rotator cuff repair and bicep tendon repair     ZZC BREAST AUGMENTATION       ZZC LAP CHOLECYSTOSOMY         Current Outpatient Medications   Medication     budesonide (PULMICORT) 0.5 MG/2ML neb solution     fluticasone (FLONASE) 50 MCG/ACT nasal spray     omeprazole (PRILOSEC) 40 MG DR capsule     No current facility-administered medications for this visit.          Allergies   Allergen Reactions     Augmentin Diarrhea     C diff     Ciprofloxacin Unknown     Tingling in feet     Doxycycline Unknown     Flagyl [Metronidazole] Other (See Comments)     Food      Mangoes cause rash     Sulfamethoxazole W/Trimethoprim Rash       History reviewed. No pertinent family history.    Social History     Socioeconomic History     Marital status:      Spouse name: None     Number of children: None     Years of education: None     Highest education level: None   Occupational History     None   Tobacco Use     Smoking status: Former Smoker     Years: 3.00     Types: Cigarettes     Quit date:      Years since quittin.5     Smokeless tobacco: Never Used   Substance and Sexual Activity     Alcohol use: Yes     Alcohol/week: 0.0 standard drinks     Comment: Occassional     Drug use: No     Sexual activity: None   Other Topics Concern     Parent/sibling w/ CABG, MI or angioplasty before 65F 55M? Not Asked   Social History Narrative     None     Social Determinants of Health     Financial Resource Strain:      Difficulty of Paying Living Expenses:    Food Insecurity:      Worried About Running Out of Food in the Last Year:      Ran Out of Food in the Last Year:    Transportation Needs:      Lack of Transportation (Medical):      Lack of Transportation  (Non-Medical):    Physical Activity:      Days of Exercise per Week:      Minutes of Exercise per Session:    Stress:      Feeling of Stress :    Social Connections:      Frequency of Communication with Friends and Family:      Frequency of Social Gatherings with Friends and Family:      Attends Zoroastrianism Services:      Active Member of Clubs or Organizations:      Attends Club or Organization Meetings:      Marital Status:    Intimate Partner Violence:      Fear of Current or Ex-Partner:      Emotionally Abused:      Physically Abused:      Sexually Abused:        ROS: 10 point ROS neg other than the symptoms noted above in the HPI.  EXAM  Constitutional: healthy, alert and no distress    Psychiatric: mentation appears normal and affect normal/bright    VASCULAR:  -Dorsalis pedis pulse +2/4 b/l  -Posterior tibial pulse +2/4 b/l  -Capillary refill time < 3 seconds to b/l hallux  NEURO:  -Light touch sensation intact to b/l plantar forefoot  DERM:  -Skin temperature, texture and turgor WNL b/l  -Mild hyperkeratotic lesion to the distal plantar tip of the LEFT 2nd digit  MSK:  -Pain on palpation to LEFT lateral and medial dorsal 1st MTPJ (lateral more than medial)  -Pain with end ROM with DORSIFLEXION and PLANTARFLEXION of the 1st MTPJ of hte L fofot    -LEFT 1st MTPJ has mild bony prominence on the dorsal and medial aspect of the 1st metatarsal head  ---ROM limited to < 20 degrees DORSIFLEXION with loading the forefot    -Mild PLANTARFLEXION of the DIPJ of the LEFT foot DIPJ    -Muscle strength of ankles +5/5 for dorsiflexion, plantarflexion, ABDUction and ADDuction b/l    -Ankle joint passive ROM within normal limits except for dorsiflexion:    Dorsiflexion, RIGHT Straight knee 0 degrees    Dorsiflexion, LEFT Straight knee 0 degrees    -Moderate decrease in arch height while patient is NWB    RIGHT FOOT RADIOGRAPH 07/08/2021  IMPRESSION: 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 Treatment options discussed with no educational barriers noted.  -Discussed etiology and treatment options for hallux rigidus:  ---Discussed how this deformity can progress and what can be done to treat the deformity. Also explained how elevation of the first ray can cause distal 2nd digit pressure and pain, especially with curling of the toe.  ---Conservative treatment options consist of wider shoe gear and orthotics +/- padding/splinting to accommodate the painful toe. There may be pain relief from a hernandez's extension or a reverse hernandez's extension in an orthotic. This will not correct the deformity, but may help decrease pain. Patient may also benefit from an injection for more acute pain. She has had months of relief from injections in the past. This is does not usually provide long term pain relief without also addressing the biomechanics, but it may improve his overall pain.   ---Discussed surgical treatment options including risks and benefits and post-op periods. The surgical procedure of choice is dependent on amount of joint space remaining and radiographic angles.  ---At this time, patient would like to try the orthotics and an injection.    -Injection x 1st MTPJ to the LEFT foot: Obtained written and verbal consent from patient for a steroid injection into the  1s MTPJ of the Left foot. Reviewed risks and benefits of the injection. Informed patient that the  injection may decrease pain long-term, short-term, or not at all. Patient in agreement with an injection today in an effort to slow or reverse the chronic inflammatory process and pain in the foot.   ---Patient signed informed consent and a time-out was performed and there was verification of correct patient, procedure and laterality.  ---Prepped the injection site with an alcohol swab.  ---Injected a total of 0.5 mL (20mg) Kenalog and 0.5mL of 2% Lidocaine plain in to the dorsal lateral and dorsal medial joint. Patient had this same steroid injected into the joint space in the past with successful pain relief. Patient tolerated the injection well.     -Consider calling [facility] to make an appointment with the orthotist, Ralph Lance. He takes appointments on Thursdays. Let him know you have pain in the LEFT first metatarsophalangeal joint. You need an orthotic with a hernandez's extension on the LEFT foot. Patient is in agreement with this plan.    -Patient in agreement with the above treatment plan and all of patient's questions were answered.      RTC as needed per patient request        Nya Bui DPM

## 2022-02-02 ENCOUNTER — ANCILLARY PROCEDURE (OUTPATIENT)
Dept: MAMMOGRAPHY | Facility: OTHER | Age: 52
End: 2022-02-02
Attending: FAMILY MEDICINE
Payer: COMMERCIAL

## 2022-02-02 ENCOUNTER — TELEPHONE (OUTPATIENT)
Dept: MAMMOGRAPHY | Facility: OTHER | Age: 52
End: 2022-02-02

## 2022-02-02 DIAGNOSIS — Z12.31 VISIT FOR SCREENING MAMMOGRAM: ICD-10-CM

## 2022-02-02 PROCEDURE — 77067 SCR MAMMO BI INCL CAD: CPT | Mod: TC | Performed by: RADIOLOGY

## 2022-02-02 PROCEDURE — 77063 BREAST TOMOSYNTHESIS BI: CPT | Mod: TC | Performed by: RADIOLOGY

## 2022-02-16 ENCOUNTER — OFFICE VISIT (OUTPATIENT)
Dept: FAMILY MEDICINE | Facility: OTHER | Age: 52
End: 2022-02-16
Attending: FAMILY MEDICINE
Payer: COMMERCIAL

## 2022-02-16 ENCOUNTER — NURSE TRIAGE (OUTPATIENT)
Dept: FAMILY MEDICINE | Facility: OTHER | Age: 52
End: 2022-02-16
Payer: COMMERCIAL

## 2022-02-16 VITALS
OXYGEN SATURATION: 97 % | HEIGHT: 64 IN | WEIGHT: 160 LBS | DIASTOLIC BLOOD PRESSURE: 72 MMHG | RESPIRATION RATE: 16 BRPM | TEMPERATURE: 99 F | BODY MASS INDEX: 27.31 KG/M2 | HEART RATE: 84 BPM | SYSTOLIC BLOOD PRESSURE: 118 MMHG

## 2022-02-16 DIAGNOSIS — R10.84 ABDOMINAL PAIN, GENERALIZED: Primary | ICD-10-CM

## 2022-02-16 DIAGNOSIS — R10.84 ABDOMINAL PAIN, GENERALIZED: ICD-10-CM

## 2022-02-16 LAB
ALBUMIN SERPL-MCNC: 4.5 G/DL (ref 3.5–5.7)
ALP SERPL-CCNC: 47 U/L (ref 34–104)
ALT SERPL W P-5'-P-CCNC: 8 U/L (ref 7–52)
ANION GAP SERPL CALCULATED.3IONS-SCNC: 10 MMOL/L (ref 3–14)
AST SERPL W P-5'-P-CCNC: 13 U/L (ref 13–39)
BASOPHILS # BLD AUTO: 0 10E3/UL (ref 0–0.2)
BASOPHILS NFR BLD AUTO: 0 %
BILIRUB SERPL-MCNC: 0.5 MG/DL (ref 0.3–1)
BUN SERPL-MCNC: 8 MG/DL (ref 7–25)
CALCIUM SERPL-MCNC: 9.2 MG/DL (ref 8.6–10.3)
CHLORIDE BLD-SCNC: 102 MMOL/L (ref 98–107)
CO2 SERPL-SCNC: 26 MMOL/L (ref 21–31)
CREAT SERPL-MCNC: 0.77 MG/DL (ref 0.6–1.2)
CRP SERPL-MCNC: 10.6 MG/L
EOSINOPHIL # BLD AUTO: 0.1 10E3/UL (ref 0–0.7)
EOSINOPHIL NFR BLD AUTO: 1 %
ERYTHROCYTE [DISTWIDTH] IN BLOOD BY AUTOMATED COUNT: 12.6 % (ref 10–15)
GFR SERPL CREATININE-BSD FRML MDRD: >90 ML/MIN/1.73M2
GLUCOSE BLD-MCNC: 86 MG/DL (ref 70–105)
HCT VFR BLD AUTO: 44.7 % (ref 35–47)
HGB BLD-MCNC: 14.8 G/DL (ref 11.7–15.7)
IMM GRANULOCYTES # BLD: 0.1 10E3/UL
IMM GRANULOCYTES NFR BLD: 1 %
LYMPHOCYTES # BLD AUTO: 1.4 10E3/UL (ref 0.8–5.3)
LYMPHOCYTES NFR BLD AUTO: 22 %
MCH RBC QN AUTO: 30.1 PG (ref 26.5–33)
MCHC RBC AUTO-ENTMCNC: 33.1 G/DL (ref 31.5–36.5)
MCV RBC AUTO: 91 FL (ref 78–100)
MONOCYTES # BLD AUTO: 1 10E3/UL (ref 0–1.3)
MONOCYTES NFR BLD AUTO: 16 %
NEUTROPHILS # BLD AUTO: 4 10E3/UL (ref 1.6–8.3)
NEUTROPHILS NFR BLD AUTO: 60 %
NRBC # BLD AUTO: 0 10E3/UL
NRBC BLD AUTO-RTO: 0 /100
PLATELET # BLD AUTO: 281 10E3/UL (ref 150–450)
POTASSIUM BLD-SCNC: 3.6 MMOL/L (ref 3.5–5.1)
PROT SERPL-MCNC: 6.5 G/DL (ref 6.4–8.9)
RBC # BLD AUTO: 4.92 10E6/UL (ref 3.8–5.2)
SODIUM SERPL-SCNC: 138 MMOL/L (ref 134–144)
WBC # BLD AUTO: 6.6 10E3/UL (ref 4–11)

## 2022-02-16 PROCEDURE — 99213 OFFICE O/P EST LOW 20 MIN: CPT | Performed by: FAMILY MEDICINE

## 2022-02-16 PROCEDURE — 86140 C-REACTIVE PROTEIN: CPT | Performed by: FAMILY MEDICINE

## 2022-02-16 PROCEDURE — 36415 COLL VENOUS BLD VENIPUNCTURE: CPT | Performed by: FAMILY MEDICINE

## 2022-02-16 PROCEDURE — 80053 COMPREHEN METABOLIC PANEL: CPT | Performed by: FAMILY MEDICINE

## 2022-02-16 PROCEDURE — 85025 COMPLETE CBC W/AUTO DIFF WBC: CPT | Performed by: FAMILY MEDICINE

## 2022-02-16 ASSESSMENT — PAIN SCALES - GENERAL: PAINLEVEL: MILD PAIN (2)

## 2022-02-16 NOTE — PROGRESS NOTES
"  Assessment & Plan     Abdominal pain, generalized  History of diverticulitis, but also pelvic floor dysfunction.  Planning 2 week of PT for this.  Wants to rule out infection as cause of current flare.  CBC normal.  Will notify of CRP and CMP.  She can share them with her [facility] provider through her patient portal.  - CRP, inflammation  - CBC with platelets and differential  - Comprehensive metabolic panel (BMP + Alb, Alk Phos, ALT, AST, Total. Bili, TP)       BMI:   Estimated body mass index is 27.46 kg/m  as calculated from the following:    Height as of this encounter: 1.626 m (5' 4\").    Weight as of this encounter: 72.6 kg (160 lb).   Weight management plan: Discussed healthy diet and exercise guidelines    See Patient Instructions        Jenelle Rivera MD  [facility] - JOHANA Martin is a 51 year old who presents for the following health issues     HPI       Abdominal Pain    Duration: 3 days    Description (location/character/radiation): lower left       Associated flank pain: None    Intensity:  moderate    Accompanying signs and symptoms:        Fever/Chills: YES - 100.5 highest last night; yesterday and today 1 dose of Tylenol - single tab ES       Gas/Bloating: YES       Nausea/vomitting: no        Diarrhea: no        Dysuria or Hematuria: no     History (previous similar pain/trauma/previous testing): yes    Precipitating or alleviating factors:       Pain worse with eating/BM/urination: BM       Pain relieved by BM: no     Therapies tried and outcome: Tylenol    LMP:  not applicable     Last check was 11/2021 - [facility] - negative labs    [facility]    Will be starting biofeedback, pelvic floor PT.  2 week course - 3 times per day.    Home covid test - negative.    Review of Systems   Constitutional, HEENT, cardiovascular, pulmonary, gi and gu systems are negative, except as otherwise noted.      Objective    /72 (BP Location: Right arm, Patient Position: Sitting, Cuff " "Size: Adult Regular)   Pulse 84   Temp 99  F (37.2  C) (Tympanic)   Resp 16   Ht 1.626 m (5' 4\")   Wt 72.6 kg (160 lb)   LMP 04/11/2016 (LMP Unknown)   SpO2 97%   BMI 27.46 kg/m    Body mass index is 27.46 kg/m .  Physical Exam   GENERAL: healthy, alert and no distress  NECK: no adenopathy, no asymmetry, masses, or scars and thyroid normal to palpation  RESP: lungs clear to auscultation - no rales, rhonchi or wheezes  CV: regular rate and rhythm, normal S1 S2, no S3 or S4, no murmur, click or rub, no peripheral edema and peripheral pulses strong  ABDOMEN: tenderness mild LLQ without gurading, no organomegaly or masses and bowel sounds normal  MS: no gross musculoskeletal defects noted, no edema  PSYCH: mentation appears normal, affect normal/bright    Results for orders placed or performed in visit on 02/16/22 (from the past 24 hour(s))   CBC with platelets and differential    Narrative    The following orders were created for panel order CBC with platelets and differential.  Procedure                               Abnormality         Status                     ---------                               -----------         ------                     CBC with platelets and d...[[id number]]                      Final result                 Please view results for these tests on the individual orders.   CBC with platelets and differential   Result Value Ref Range    WBC Count 6.6 4.0 - 11.0 10e3/uL    RBC Count 4.92 3.80 - 5.20 10e6/uL    Hemoglobin 14.8 11.7 - 15.7 g/dL    Hematocrit 44.7 35.0 - 47.0 %    MCV 91 78 - 100 fL    MCH 30.1 26.5 - 33.0 pg    MCHC 33.1 31.5 - 36.5 g/dL    RDW 12.6 10.0 - 15.0 %    Platelet Count 281 150 - 450 10e3/uL    % Neutrophils 60 %    % Lymphocytes 22 %    % Monocytes 16 %    % Eosinophils 1 %    % Basophils 0 %    % Immature Granulocytes 1 %    NRBCs per 100 WBC 0 <1 /100    Absolute Neutrophils 4.0 1.6 - 8.3 10e3/uL    Absolute Lymphocytes 1.4 0.8 - 5.3 10e3/uL    Absolute " Monocytes 1.0 0.0 - 1.3 10e3/uL    Absolute Eosinophils 0.1 0.0 - 0.7 10e3/uL    Absolute Basophils 0.0 0.0 - 0.2 10e3/uL    Absolute Immature Granulocytes 0.1 <=0.4 10e3/uL    Absolute NRBCs 0.0 10e3/uL

## 2022-02-16 NOTE — NURSING NOTE
"Chief Complaint   Patient presents with     Abdominal Pain       Initial /72 (BP Location: Right arm, Patient Position: Sitting, Cuff Size: Adult Regular)   Pulse 84   Temp 99  F (37.2  C) (Tympanic)   Resp 16   Ht 1.626 m (5' 4\")   Wt 72.6 kg (160 lb)   LMP 04/11/2016 (LMP Unknown)   SpO2 97%   BMI 27.46 kg/m   Estimated body mass index is 27.46 kg/m  as calculated from the following:    Height as of this encounter: 1.626 m (5' 4\").    Weight as of this encounter: 72.6 kg (160 lb).  Medication Reconciliation: complete  Jennifer Lala LPN    "

## 2022-02-16 NOTE — TELEPHONE ENCOUNTER
Would need to see her.  I did place lab orders.  Can go to lab first when she checks in.  Can see her at 2.

## 2022-02-16 NOTE — TELEPHONE ENCOUNTER
Call from patient reporting Hx of diverticulitis - IBS on Problem list. Patient is experiencing abdominal pain- lower left quadrant, requesting appt for lab work. Temp 100.5 on 2/15/22, 99.1 on 2/16/22.    Inquiring if able to see Dr. Ruiz today or come in for lab only. Patient is followed by a provider at [facility] for diverticulitis, however, labs are ordered by PCP.    No openings on Dr. Ruiz's Schedule, please advise.    Patient can be reached at [phone].

## 2022-02-17 ENCOUNTER — LAB (OUTPATIENT)
Dept: FAMILY MEDICINE | Facility: OTHER | Age: 52
End: 2022-02-17
Attending: FAMILY MEDICINE
Payer: COMMERCIAL

## 2022-02-17 DIAGNOSIS — Z20.822 SUSPECTED COVID-19 VIRUS INFECTION: ICD-10-CM

## 2022-02-17 PROCEDURE — U0005 INFEC AGEN DETEC AMPLI PROBE: HCPCS

## 2022-02-17 PROCEDURE — U0003 INFECTIOUS AGENT DETECTION BY NUCLEIC ACID (DNA OR RNA); SEVERE ACUTE RESPIRATORY SYNDROME CORONAVIRUS 2 (SARS-COV-2) (CORONAVIRUS DISEASE [COVID-19]), AMPLIFIED PROBE TECHNIQUE, MAKING USE OF HIGH THROUGHPUT TECHNOLOGIES AS DESCRIBED BY CMS-2020-01-R: HCPCS

## 2022-02-18 LAB — SARS-COV-2 RNA RESP QL NAA+PROBE: NEGATIVE

## 2022-03-18 ENCOUNTER — NURSE TRIAGE (OUTPATIENT)
Dept: FAMILY MEDICINE | Facility: OTHER | Age: 52
End: 2022-03-18
Payer: COMMERCIAL

## 2022-03-18 ENCOUNTER — OFFICE VISIT (OUTPATIENT)
Dept: FAMILY MEDICINE | Facility: OTHER | Age: 52
End: 2022-03-18
Attending: NURSE PRACTITIONER
Payer: COMMERCIAL

## 2022-03-18 ENCOUNTER — APPOINTMENT (OUTPATIENT)
Dept: LAB | Facility: OTHER | Age: 52
End: 2022-03-18
Attending: NURSE PRACTITIONER
Payer: COMMERCIAL

## 2022-03-18 VITALS
OXYGEN SATURATION: 98 % | WEIGHT: 162 LBS | HEART RATE: 86 BPM | DIASTOLIC BLOOD PRESSURE: 82 MMHG | SYSTOLIC BLOOD PRESSURE: 126 MMHG | TEMPERATURE: 98 F | BODY MASS INDEX: 27.81 KG/M2

## 2022-03-18 DIAGNOSIS — R19.7 DIARRHEA, UNSPECIFIED TYPE: Primary | ICD-10-CM

## 2022-03-18 DIAGNOSIS — Z87.19 HISTORY OF DIVERTICULITIS: Primary | ICD-10-CM

## 2022-03-18 DIAGNOSIS — Z87.19 HISTORY OF DIVERTICULITIS: ICD-10-CM

## 2022-03-18 DIAGNOSIS — R10.31 RLQ ABDOMINAL PAIN: ICD-10-CM

## 2022-03-18 LAB
ANION GAP SERPL CALCULATED.3IONS-SCNC: 3 MMOL/L (ref 3–14)
BASOPHILS # BLD AUTO: 0.1 10E3/UL (ref 0–0.2)
BASOPHILS NFR BLD AUTO: 1 %
BUN SERPL-MCNC: 14 MG/DL (ref 7–30)
C DIFF TOX B STL QL: NEGATIVE
CALCIUM SERPL-MCNC: 8.9 MG/DL (ref 8.5–10.1)
CHLORIDE BLD-SCNC: 105 MMOL/L (ref 94–109)
CO2 SERPL-SCNC: 28 MMOL/L (ref 20–32)
CREAT SERPL-MCNC: 0.7 MG/DL (ref 0.52–1.04)
CRP SERPL-MCNC: 3.4 MG/L (ref 0–8)
EOSINOPHIL # BLD AUTO: 0.1 10E3/UL (ref 0–0.7)
EOSINOPHIL NFR BLD AUTO: 1 %
ERYTHROCYTE [DISTWIDTH] IN BLOOD BY AUTOMATED COUNT: 12.8 % (ref 10–15)
GFR SERPL CREATININE-BSD FRML MDRD: >90 ML/MIN/1.73M2
GLUCOSE BLD-MCNC: 111 MG/DL (ref 70–99)
HCT VFR BLD AUTO: 45 % (ref 35–47)
HGB BLD-MCNC: 15 G/DL (ref 11.7–15.7)
IMM GRANULOCYTES # BLD: 0 10E3/UL
IMM GRANULOCYTES NFR BLD: 0 %
LYMPHOCYTES # BLD AUTO: 1.4 10E3/UL (ref 0.8–5.3)
LYMPHOCYTES NFR BLD AUTO: 16 %
MCH RBC QN AUTO: 30.4 PG (ref 26.5–33)
MCHC RBC AUTO-ENTMCNC: 33.3 G/DL (ref 31.5–36.5)
MCV RBC AUTO: 91 FL (ref 78–100)
MONOCYTES # BLD AUTO: 0.6 10E3/UL (ref 0–1.3)
MONOCYTES NFR BLD AUTO: 7 %
NEUTROPHILS # BLD AUTO: 6.7 10E3/UL (ref 1.6–8.3)
NEUTROPHILS NFR BLD AUTO: 75 %
NRBC # BLD AUTO: 0 10E3/UL
NRBC BLD AUTO-RTO: 0 /100
PLATELET # BLD AUTO: 312 10E3/UL (ref 150–450)
POTASSIUM BLD-SCNC: 3.8 MMOL/L (ref 3.4–5.3)
RBC # BLD AUTO: 4.94 10E6/UL (ref 3.8–5.2)
SODIUM SERPL-SCNC: 136 MMOL/L (ref 133–144)
WBC # BLD AUTO: 8.9 10E3/UL (ref 4–11)

## 2022-03-18 PROCEDURE — 87637 SARSCOV2&INF A&B&RSV AMP PRB: CPT | Performed by: NURSE PRACTITIONER

## 2022-03-18 PROCEDURE — 87493 C DIFF AMPLIFIED PROBE: CPT | Performed by: NURSE PRACTITIONER

## 2022-03-18 PROCEDURE — 80048 BASIC METABOLIC PNL TOTAL CA: CPT | Performed by: NURSE PRACTITIONER

## 2022-03-18 PROCEDURE — 86140 C-REACTIVE PROTEIN: CPT | Performed by: NURSE PRACTITIONER

## 2022-03-18 PROCEDURE — 85025 COMPLETE CBC W/AUTO DIFF WBC: CPT | Performed by: NURSE PRACTITIONER

## 2022-03-18 PROCEDURE — 99213 OFFICE O/P EST LOW 20 MIN: CPT | Performed by: NURSE PRACTITIONER

## 2022-03-18 PROCEDURE — 36415 COLL VENOUS BLD VENIPUNCTURE: CPT | Performed by: NURSE PRACTITIONER

## 2022-03-18 ASSESSMENT — PAIN SCALES - GENERAL: PAINLEVEL: MODERATE PAIN (4)

## 2022-03-18 NOTE — NURSING NOTE
"Chief Complaint   Patient presents with     Abdominal Pain       Initial /82 (BP Location: Left arm, Patient Position: Sitting, Cuff Size: Adult Regular)   Pulse 86   Temp 98  F (36.7  C) (Tympanic)   Wt 73.5 kg (162 lb)   LMP 04/11/2016 (LMP Unknown)   SpO2 98%   BMI 27.81 kg/m   Estimated body mass index is 27.81 kg/m  as calculated from the following:    Height as of 2/16/22: 1.626 m (5' 4\").    Weight as of this encounter: 73.5 kg (162 lb).  Medication Reconciliation: complete  Jennifer Lala LPN    "

## 2022-03-18 NOTE — PROGRESS NOTES
Assessment & Plan     RLQ abdominal pain  Exam and blood work reassuring today. Return with stool sample for c-diff testing. Go to the ER with any severe or worsening symptoms. See AVS for patient education reviewed.   - CBC with platelets and differential; Future  - CRP, inflammation; Future  - Basic metabolic panel; Future  - CBC with platelets and differential  - CRP, inflammation  - Basic metabolic panel    History of diverticulitis  Noted. Previous presentation was with different symptoms. At this time, I do not find an indication of imaging.     Diarrhea, unspecified type  - Clostridium difficile Toxin B PCR; Future  - Clostridium difficile Toxin B PCR  - Asymptomatic Influenza A/B & SARS-CoV2 (COVID-19) Virus PCR Multiplex; Future  - Asymptomatic Influenza A/B & SARS-CoV2 (COVID-19) Virus PCR Multiplex Nose    Ordering of each unique test       Return if symptoms worsen or fail to improve.    Keila Darden, CNP  [facility] - JOHANA Martin is a 52 year old who presents for the following health issues    HPI     Concern - Abdominal pain  Onset: Wednesday  Description: intermittent abdominal pain  Intensity: 4/10  Progression of Symptoms:  same  Accompanying Signs & Symptoms: bloating & diarrhea  Previous history of similar problem: yes  Precipitating factors:        Worsened by: positional  Alleviating factors:        Improved by: none  Therapies tried and outcome: None    2 loose stools yesterday and one last week. Foul odor. She had c-diff about 2 years ago and is concerned it may have returned. Smells similar.     Does see gastro at the [facility]. She reports her plan is to have labs if symptoms start. Today's labs they are normal.       Review of Systems   Denies anorexia, nausea, or vomiting. Previous diverticulitis was LLQ.   Constitutional, HEENT, cardiovascular, pulmonary, gi and gu systems are negative, except as otherwise noted.      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)     S/P abdominoplasty     Irritable bowel syndrome with diarrhea     Iron deficiency     H/O deep venous thrombosis     Cervicalgia     Deviated nasal septum     Fibrocystic breast changes     Ulcer of esophagus     Biliary dyskinesia     Diverticulitis     Colon cancer screening     Prolapsed internal hemorrhoids     Migraine     Other specified postprocedural states     Pain of foot     Arthralgia of hip     Personal history of other venous thrombosis and embolism     Shoulder pain     Other specified diseases of gallbladder     Past Surgical History:   Procedure Laterality Date     ABDOMINOPLASTY        SECTION      x2     COLONOSCOPY - HIM SCAN  2011    Colonoscopy 2011     COLONOSCOPY - HIM SCAN  2018    Procedure:  Colonoscopy diagnostic with polypectomy and biopsies;  Surgeon:  Danelle Swift MD;  Location:  [facility] OR     COLONOSCOPY - HIM SCAN  2007    Colonoscopy, flex, diagnostic     DILATE CERVIX, HYSTEROSCOPY, ABLATE ENDOMETRIUM, COMBINED N/A 2016    Procedure: COMBINED DILATE CERVIX, HYSTEROSCOPY, ABLATE ENDOMETRIUM;  Surgeon: Pacheco Curry MD;  Location: [facility] OR     ESOPHAGOGASTRODUODENOSCOPY      negative     ESOPHAGOSCOPY, GASTROSCOPY, DUODENOSCOPY (EGD), COMBINED N/A 2020    Procedure: Upper Endoscopy with biopsy;  Surgeon: Son Cooper MD;  Location: [facility] OR     ORTHOPEDIC SURGERY Left     rotator cuff repair and bicep tendon repair     ZZC BREAST AUGMENTATION       ZZC LAP CHOLECYSTOSOMY         Social History     Tobacco Use     Smoking status: Former Smoker     Years: 3.00     Types: Cigarettes     Quit date:      Years since quittin.2     Smokeless tobacco: Never Used   Substance Use Topics     Alcohol use: Yes     Alcohol/week: 0.0 standard drinks     Comment: Occassional     No family history on file.        Current Outpatient Medications   Medication Sig  Dispense Refill     budesonide (PULMICORT) 0.5 MG/2ML neb solution Squirt entire vial into freedom med saline solution, mix, and irrigate each nostril until entire bottle empty.  Do this twice daily. 200 mL 11     fluticasone (FLONASE) 50 MCG/ACT nasal spray SHAKE LIQUID AND USE 2 SPRAYS IN EACH NOSTRIL DAILY 16 g 0     omeprazole (PRILOSEC) 40 MG DR capsule TAKE 1 CAPSULE(40 MG) BY MOUTH DAILY 90 capsule 3     Allergies   Allergen Reactions     Augmentin Diarrhea     C diff     Ciprofloxacin Unknown     Tingling in feet     Doxycycline Unknown     Flagyl [Metronidazole] Other (See Comments)     Food      Mangoes cause rash     Sulfamethoxazole W/Trimethoprim Rash     Recent Labs   Lab Test 03/18/22  1120 02/16/22  1403 05/14/21  0000 04/13/21  1156 04/11/21  0956 03/24/21  1514 03/22/20  2148 10/10/19  0000   A1C  --   --   --   --   --  5.3  --   --    LDL  --   --   --   --   --   --   --  100   HDL  --   --   --   --   --   --   --  47   TRIG  --   --   --   --   --   --   --  136   ALT  --  8  --  17 16 27   < >  --    CR 0.70 0.77   < > 0.71 0.61 0.71   < >  --    GFRESTIMATED >90 >90   < > >90 >90 >90   < >  --    GFRESTBLACK  --   --   --  >90 >90 >90   < >  --    POTASSIUM 3.8 3.6   < > 3.8 3.7 3.7   < >  --     < > = values in this interval not displayed.        BP Readings from Last 3 Encounters:   03/18/22 126/82   02/16/22 118/72   01/17/22 122/85    Wt Readings from Last 3 Encounters:   03/18/22 73.5 kg (162 lb)   02/16/22 72.6 kg (160 lb)   12/06/21 75.8 kg (167 lb)                    Objective    LMP 04/11/2016 (LMP Unknown)   There is no height or weight on file to calculate BMI.  Physical Exam   GENERAL: healthy, alert and no distress  EYES: Eyes grossly normal to inspection, PERRL and conjunctivae and sclerae normal  RESP: lungs clear to auscultation - no rales, rhonchi or wheezes  CV: regular rate and rhythm, normal S1 S2, no S3 or S4, no murmur, click or rub, no peripheral edema and peripheral  pulses strong  ABDOMEN: Moved to exam table without difficulty or visible discomfort. Abdomen soft, nontender, no hepatosplenomegaly, no masses and bowel sounds normal. Non-tender McBurney's point.     Results for orders placed or performed in visit on 03/18/22   CRP, inflammation     Status: Normal   Result Value Ref Range    CRP Inflammation 3.4 0.0 - 8.0 mg/L   Basic metabolic panel     Status: Abnormal   Result Value Ref Range    Sodium 136 133 - 144 mmol/L    Potassium 3.8 3.4 - 5.3 mmol/L    Chloride 105 94 - 109 mmol/L    Carbon Dioxide (CO2) 28 20 - 32 mmol/L    Anion Gap 3 3 - 14 mmol/L    Urea Nitrogen 14 7 - 30 mg/dL    Creatinine 0.70 0.52 - 1.04 mg/dL    Calcium 8.9 8.5 - 10.1 mg/dL    Glucose 111 (H) 70 - 99 mg/dL    GFR Estimate >90 >60 mL/min/1.73m2   CBC with platelets and differential     Status: None   Result Value Ref Range    WBC Count 8.9 4.0 - 11.0 10e3/uL    RBC Count 4.94 3.80 - 5.20 10e6/uL    Hemoglobin 15.0 11.7 - 15.7 g/dL    Hematocrit 45.0 35.0 - 47.0 %    MCV 91 78 - 100 fL    MCH 30.4 26.5 - 33.0 pg    MCHC 33.3 31.5 - 36.5 g/dL    RDW 12.8 10.0 - 15.0 %    Platelet Count 312 150 - 450 10e3/uL    % Neutrophils 75 %    % Lymphocytes 16 %    % Monocytes 7 %    % Eosinophils 1 %    % Basophils 1 %    % Immature Granulocytes 0 %    NRBCs per 100 WBC 0 <1 /100    Absolute Neutrophils 6.7 1.6 - 8.3 10e3/uL    Absolute Lymphocytes 1.4 0.8 - 5.3 10e3/uL    Absolute Monocytes 0.6 0.0 - 1.3 10e3/uL    Absolute Eosinophils 0.1 0.0 - 0.7 10e3/uL    Absolute Basophils 0.1 0.0 - 0.2 10e3/uL    Absolute Immature Granulocytes 0.0 <=0.4 10e3/uL    Absolute NRBCs 0.0 10e3/uL   Asymptomatic Influenza A/B & SARS-CoV2 (COVID-19) Virus PCR Multiplex Nose     Status: Normal    Specimen: Nose; Swab   Result Value Ref Range    Influenza A PCR Negative Negative    Influenza B PCR Negative Negative    RSV PCR Negative Negative    SARS CoV2 PCR Negative Negative    Narrative    Testing was performed using the  Xpert Xpress CoV2/Flu/RSV Assay on the Cepheid GeneXpert Instrument. This test should be ordered for the detection of SARS-CoV-2 and influenza viruses in individuals who meet clinical and/or epidemiological criteria. Test performance is unknown in asymptomatic patients. This test is for in vitro diagnostic use under the FDA EUA for laboratories certified under CLIA to perform high or moderate complexity testing. This test has not been FDA cleared or approved. A negative result does not rule out the presence of PCR inhibitors in the specimen or target RNA in concentration below the limit of detection for the assay. If only one viral target is positive but coinfection with multiple targets is suspected, the sample should be re-tested with another FDA cleared, approved, or authorized test, if coinfection would change clinical management. This test was validated by the [facility] ([employer]). These laboratories are certified under the Clinical  Laboratory Improvement Amendments of 1988 (CLIA-88) as qualified to perform high complexity laboratory testing.   Clostridium difficile Toxin B PCR     Status: Normal    Specimen: Per Rectum; Stool   Result Value Ref Range    C Difficile Toxin B by PCR Negative Negative    Narrative    The [employer] Xpert C. difficile Assay, performed on the [employer]  Instrument Systems, is a qualitative in vitro diagnostic test for rapid detection of toxin B gene sequences from unformed (liquid or soft) stool specimens collected from patients suspected of having Clostridioides difficile infection (CDI). The test utilizes automated real-time polymerase chain reaction (PCR) to detect toxin gene sequences associated with toxin producing C. difficile. The Xpert C. difficile Assay is intended as an aid in the diagnosis of CDI.   CBC with platelets and differential     Status: None    Narrative    The following orders were created for panel order CBC with platelets and  differential.  Procedure                               Abnormality         Status                     ---------                               -----------         ------                     CBC with platelets and d...[[id number]]                      Final result                 Please view results for these tests on the individual orders.

## 2022-03-18 NOTE — TELEPHONE ENCOUNTER
"Pt called reports hx of diverticulitis requesting lab work. Pt reports possible flare up, abd discomfort right lower side.     Reason for Disposition    [1] MILD-MODERATE pain AND [2] constant AND [3] present > 2 hours    Additional Information    Negative: Shock suspected (e.g., cold/pale/clammy skin, too weak to stand, low BP, rapid pulse)    Negative: Difficult to awaken or acting confused (e.g., disoriented, slurred speech)    Negative: Passed out (i.e., lost consciousness, collapsed and was not responding)    Negative: Sounds like a life-threatening emergency to the triager    Negative: Chest pain    Negative: Pain is mainly in upper abdomen  (if needed ask: \"is it mainly above the belly button?\")    Negative: Followed an abdomen (stomach) injury    Negative: [1] Abdominal pain AND [2] pregnant < 20 weeks    Negative: [1] Abdominal pain AND [2] pregnant > 20 weeks    Negative: [1] Abdominal pain AND [2] postpartum (from 0 to 6 weeks after delivery)    Negative: [1] SEVERE pain (e.g., excruciating) AND [2] present > 1 hour    Negative: [1] SEVERE pain AND [2] age > 60    Negative: [1] Vomiting AND [2] contains red blood or black (\"coffee ground\") material  (Exception: few red streaks in vomit that only happened once)    Negative: Blood in bowel movements   (Exception: blood on surface of BM with constipation)    Negative: Black or tarry bowel movements  (Exception: chronic-unchanged  black-grey bowel movements AND is taking iron pills or Pepto-bismol)    Negative: Patient sounds very sick or weak to the triager    Answer Assessment - Initial Assessment Questions  1. LOCATION: \"Where does it hurt?\"       RLQ pain not constant and bloating present  2. RADIATION: \"Does the pain shoot anywhere else?\" (e.g., chest, back)      Little lower back  3. ONSET: \"When did the pain begin?\" (e.g., minutes, hours or days ago)       3/16/22  4. SUDDEN: \"Gradual or sudden onset?\"      Sudden hx of diverticulitis   5. PATTERN " "\"Does the pain come and go, or is it constant?\"     - If constant: \"Is it getting better, staying the same, or worsening?\"       (Note: Constant means the pain never goes away completely; most serious pain is constant and it progresses)      - If intermittent: \"How long does it last?\" \"Do you have pain now?\"      (Note: Intermittent means the pain goes away completely between bouts)      Intermittent discomfort  6. SEVERITY: \"How bad is the pain?\"  (e.g., Scale 1-10; mild, moderate, or severe)    - MILD (1-3): doesn't interfere with normal activities, abdomen soft and not tender to touch     - MODERATE (4-7): interferes with normal activities or awakens from sleep, tender to touch     - SEVERE (8-10): excruciating pain, doubled over, unable to do any normal activities       2-6 pain scale  7. RECURRENT SYMPTOM: \"Have you ever had this type of abdominal pain before?\" If so, ask: \"When was the last time?\" and \"What happened that time?\"       yes  8. CAUSE: \"What do you think is causing the abdominal pain?\"      Unknown pt reports possibly diverticulitis   9. RELIEVING/AGGRAVATING FACTORS: \"What makes it better or worse?\" (e.g., movement, antacids, bowel movement)      no  10. OTHER SYMPTOMS: \"Has there been any vomiting, diarrhea, constipation, or urine problems?\"        Diarrhea last 24 hours 2 episodes, denies vomiting, denies fever, denies any urinary sx  11. PREGNANCY: \"Is there any chance you are pregnant?\" \"When was your last menstrual period?\"        no    Protocols used: ABDOMINAL PAIN - FEMALE-A-AH      "

## 2022-03-18 NOTE — TELEPHONE ENCOUNTER
Per PCP's nurse have pt come at 11 am to send to xray prior. Left detailed VM provided triage call back number.  Pended xray order to Provider.

## 2022-03-18 NOTE — PATIENT INSTRUCTIONS
Exam and blood work reassuring today. Return with stool sample for c-diff testing. Go to the ER with any severe or worsening symptoms   Patient Education     Abdominal Pain  Abdominal pain is pain in the stomach or belly area. Everyone has this pain from time to time. In many cases it goes away on its own. But abdominal pain can sometimes be due to a serious problem, such as appendicitis. So it s important to know when to get help.    Causes of abdominal pain  There are many possible causes of abdominal pain. Common causes in adults include:    Constipation, diarrhea, or gas    Stomach acid flowing back up into the esophagus (acid reflux or heartburn)    Severe acid reflux, called GERD (gastroesophageal reflux disease)    A sore in the lining of the stomach or small intestine (peptic ulcer)    Inflammation of the gallbladder, liver, or pancreas    Gallstones or kidney stones    Appendicitis     Intestinal blockage     An internal organ pushing through a muscle or other tissue (hernia)    Urinary tract infections    In women, menstrual cramps, fibroids, ovarian cysts, pelvic inflammatory disease, or endometriosis    Inflammation or infection of the intestines, including Crohn's disease and ulcerative colitis    Irritable bowel syndrome  Diagnosing the cause of abdominal pain  Your healthcare provider will give you a physical exam help find the cause of your pain. If needed, you will have tests. Belly pain has many possible causes. So it can be hard to find the reason for your pain. Giving details about your pain can help. Tell your provider where and when you feel the pain, and what makes it better or worse. Also let your provider know if you have other symptoms such as:    Fever    Tiredness    Upset stomach (nausea)    Vomiting    Changes in bathroom habits    Blood in the stool or black, tarry stool    Weight loss that you can't explain (involuntary weight loss?)  Also report any family history of stomach or  intestinal problems, or cancers. Tell your provider about all your alcohol use and drug use. Tell your provider about all medicines you use, including herbs, vitamins, and supplements.  Treating abdominal pain  Some causes of pain need emergency medical treatment right away. These include appendicitis or a bowel blockage. Other problems can be treated with rest, fluids, or medicines. Your healthcare provider can give you specific instructions for treatment or self-care based on what is causing your pain.     If you have vomiting or diarrhea, sip water or other clear fluids. When you are ready to eat solid foods again, start with small amounts of easy-to-digest, low-fat foods. These include apple sauce, toast, or crackers.  When to get medical care  Call 911 or go to the hospital right away if you:    Can t pass stool and are vomiting    Are vomiting blood or have bloody diarrhea or black, tarry diarrhea    Have chest, neck, or shoulder pain    Feel like you might pass out    Have pain in your shoulder blades with nausea    Have sudden, severe belly pain    Have new, severe pain unlike any you have felt before    Have a belly that is rigid, hard, and hurts to touch  Call your healthcare provider if you have:    Pain for more than 5 days    Bloating for more than 2 days    Diarrhea for more than 5 days    A fever of 100.4 F (38 C) or higher, or as directed by your healthcare provider    Pain that gets worse    Weight loss for no reason    Continued lack of appetite    Blood in your stool  How to prevent abdominal pain  Here are some tips to help prevent abdominal pain:    Eat smaller amounts of food at each meal.    Don't eat greasy, fried, or other high-fat foods.    Don't eat foods that give you gas.    Exercise regularly.    Drink plenty of fluids.  To help prevent GERD symptoms:    Quit smoking.    Reduce alcohol and foods that increase stomach acid.    Don't use aspirin or over-the-counter pain and fever  medicines, if possible. This includes nonsteroidal anti-inflammatory drugs (NSAIDs).    Lose excess weight.    Finish eating at least 2 hours before you go to bed or lie down.    Raise the head of your bed.  Andrea last reviewed this educational content on 4/1/2019 2000-2021 [employer]. All rights reserved. This information is not intended as a substitute for professional medical care. Always follow your healthcare professional's instructions.

## 2022-03-19 LAB
FLUAV RNA SPEC QL NAA+PROBE: NEGATIVE
FLUBV RNA RESP QL NAA+PROBE: NEGATIVE
RSV RNA SPEC NAA+PROBE: NEGATIVE
SARS-COV-2 RNA RESP QL NAA+PROBE: NEGATIVE

## 2022-03-27 PROBLEM — R10.9 ABDOMINAL PAIN: Status: ACTIVE | Noted: 2021-12-08

## 2022-03-27 NOTE — PROGRESS NOTES
{PROVIDER CHARTING PREFERENCE:659652}    Romeo Martin is a 52 year old who presents for the following health issues {ACCOMPANIED BY STATEMENT (Optional):489031}    HPI   Due for : flue and covid booster:****  Pain History:  When did you first notice your pain? {Duration of pain :352363}  {additonal problems for provider to add (Optional):357881}    Review of Systems   {ROS COMP (Optional):839884}      Objective    LMP 04/11/2016 (LMP Unknown)   There is no height or weight on file to calculate BMI.  Physical Exam   {Exam List (Optional):122533}    {Diagnostic Test Results (Optional):283825}    {AMBULATORY ATTESTATION (Optional):948701}

## 2022-03-30 ENCOUNTER — OFFICE VISIT (OUTPATIENT)
Dept: FAMILY MEDICINE | Facility: OTHER | Age: 52
End: 2022-03-30
Attending: FAMILY MEDICINE
Payer: COMMERCIAL

## 2022-03-30 VITALS
RESPIRATION RATE: 18 BRPM | DIASTOLIC BLOOD PRESSURE: 82 MMHG | HEART RATE: 71 BPM | SYSTOLIC BLOOD PRESSURE: 139 MMHG | WEIGHT: 167 LBS | TEMPERATURE: 98.3 F | OXYGEN SATURATION: 98 % | BODY MASS INDEX: 28.67 KG/M2

## 2022-03-30 DIAGNOSIS — K22.10 ULCER OF ESOPHAGUS WITHOUT BLEEDING: ICD-10-CM

## 2022-03-30 DIAGNOSIS — K21.00 GASTROESOPHAGEAL REFLUX DISEASE WITH ESOPHAGITIS WITHOUT HEMORRHAGE: ICD-10-CM

## 2022-03-30 DIAGNOSIS — L30.9 DERMATITIS: Primary | ICD-10-CM

## 2022-03-30 PROCEDURE — 99213 OFFICE O/P EST LOW 20 MIN: CPT | Performed by: FAMILY MEDICINE

## 2022-03-30 RX ORDER — OMEPRAZOLE 40 MG/1
40 CAPSULE, DELAYED RELEASE ORAL DAILY
Qty: 90 CAPSULE | Refills: 1 | Status: SHIPPED | OUTPATIENT
Start: 2022-03-30 | End: 2023-12-11

## 2022-03-30 RX ORDER — TRIAMCINOLONE ACETONIDE 1 MG/G
OINTMENT TOPICAL 2 TIMES DAILY
Qty: 30 G | Refills: 1 | Status: SHIPPED | OUTPATIENT
Start: 2022-03-30 | End: 2022-09-09

## 2022-03-30 ASSESSMENT — PAIN SCALES - GENERAL: PAINLEVEL: NO PAIN (0)

## 2022-03-30 NOTE — PATIENT INSTRUCTIONS
Use topical steroid ointment twice daily.  Rub not itch.  Cool not heat.  Can try nightly antihistamine such as Claritin or Zyrtec or Allegra or generic equivalent.    If not resolving - dermatology consult next.    
normal...

## 2022-03-30 NOTE — NURSING NOTE
"Chief Complaint   Patient presents with     Derm Problem       Initial /82   Pulse 71   Temp 98.3  F (36.8  C) (Tympanic)   Resp 18   Wt 75.8 kg (167 lb)   LMP 04/11/2016 (LMP Unknown)   SpO2 98%   BMI 28.67 kg/m   Estimated body mass index is 28.67 kg/m  as calculated from the following:    Height as of 2/16/22: 1.626 m (5' 4\").    Weight as of this encounter: 75.8 kg (167 lb).  Medication Reconciliation: complete  Iza Redding LPN  "

## 2022-03-30 NOTE — PROGRESS NOTES
Assessment & Plan     Dermatitis  Focal - minimal objective findings.  Did clarify - no vaginal or perirectal involvement.  Very focal.  Unlikely infection, scabies, pinworms, shingles.    Contact allergy?  No new agents.  Dermatitis - nonspecific.    Meralgia peresthetica?  Has not tried anything OTC.  Trial of steroid, avoiding direct scratching, cool compress, and antihistamine use.  Consider dermatology consult - kenalog injection at site?  Trial of neurontin for the pruritis?  - triamcinolone (KENALOG) 0.1 % external ointment; Apply topically 2 times daily    Ulcer of esophagus without bleeding  Refills done- [facility] closed - needed new pharmacy- omeprazole (PRILOSEC) 40 MG DR capsule; Take 1 capsule (40 mg) by mouth daily    Gastroesophageal reflux disease with esophagitis without hemorrhage  - omeprazole (PRILOSEC) 40 MG DR capsule; Take 1 capsule (40 mg) by mouth daily         Patient Instructions   Use topical steroid ointment twice daily.  Rub not itch.  Cool not heat.  Can try nightly antihistamine such as Claritin or Zyrtec or Allegra or generic equivalent.    If not resolving - dermatology consult next.            Jenelle Rivera MD  [facility] - JOHANA Martin is a 52 year old who presents for the following health issues     HPI     Concern - Dry Patch  Onset: 3 or more months  Description: located at the base of her tail bone  Intensity: moderate  Progression of Symptoms:  constant  Accompanying Signs & Symptoms: dry, very itchy, when there is nothing pressing on it it is very itchy  Previous history of similar problem: NA  Precipitating factors:        Worsened by: pressure  Alleviating factors:        Improved by: NA  Therapies tried and outcome:  none   No pain.  No weeping.  No bleeding.  No topical agents.  No new agents.  No other areas affected    Review of Systems   Constitutional, HEENT, cardiovascular, pulmonary, gi and gu systems are negative, except as  otherwise noted.      Objective    /82   Pulse 71   Temp 98.3  F (36.8  C) (Tympanic)   Resp 18   Wt 75.8 kg (167 lb)   LMP 04/11/2016 (LMP Unknown)   SpO2 98%   BMI 28.67 kg/m    Body mass index is 28.67 kg/m .  Physical Exam   GENERAL: healthy, alert and no distress  MS: no gross musculoskeletal defects noted, no edema  SKIN: low back - just above gluteal cleft - is area of pruritis; there is 1 small papule, 1-2 mm in size; no discrete rashes, bruising, vesicles, breakdown, erythema, scale or other change  PSYCH: mentation appears normal, affect normal/bright

## 2022-03-31 ENCOUNTER — OFFICE VISIT (OUTPATIENT)
Dept: FAMILY MEDICINE | Facility: OTHER | Age: 52
End: 2022-03-31
Attending: FAMILY MEDICINE
Payer: COMMERCIAL

## 2022-03-31 DIAGNOSIS — Z20.822 SUSPECTED COVID-19 VIRUS INFECTION: ICD-10-CM

## 2022-03-31 PROCEDURE — U0005 INFEC AGEN DETEC AMPLI PROBE: HCPCS

## 2022-03-31 PROCEDURE — U0003 INFECTIOUS AGENT DETECTION BY NUCLEIC ACID (DNA OR RNA); SEVERE ACUTE RESPIRATORY SYNDROME CORONAVIRUS 2 (SARS-COV-2) (CORONAVIRUS DISEASE [COVID-19]), AMPLIFIED PROBE TECHNIQUE, MAKING USE OF HIGH THROUGHPUT TECHNOLOGIES AS DESCRIBED BY CMS-2020-01-R: HCPCS

## 2022-04-01 LAB — SARS-COV-2 RNA RESP QL NAA+PROBE: NEGATIVE

## 2022-05-05 NOTE — PROGRESS NOTES
Chief complaint: Patient presents with:  Musculoskeletal Problem: Left foot, great toe        History of Present Illness: This 51 year old female is seen for pain of the LEFT 1st MTPJ. She says her last injection lasted up until this past week (about four months). The pain is in the 1st MTPJ and is along the top of the joint space. She is doing a lot of gardening now that the weather is nicer so she has been in a crouched position with her great toe joint bent.    Patient said she went to [facility] and tried an orthotic with a hernandez's extension, but it made her pain worse. She would like another injection today. She said she was told previously by another provider that her great toe joint would need to be fused if she considered surgery. She is wondering if a fusion is her only option surgically if her pain in the great toe joint worsens in the future.    No further pedal complaints today.     Patient does not use tobacco products.         BP (!) 149/89 (BP Location: Left arm, Patient Position: Sitting, Cuff Size: Adult Regular)   Pulse 81   Temp 98.8  F (37.1  C) (Tympanic)   Resp 16   LMP 2016 (LMP Unknown)   SpO2 98%     Patient Active Problem List   Diagnosis     Menorrhagia     Sinusitis, chronic     Prothrombin [id number] mutation (H)     S/P abdominoplasty     Irritable bowel syndrome with diarrhea     Iron deficiency     H/O deep venous thrombosis     Cervicalgia     Deviated nasal septum     Fibrocystic breast changes     Ulcer of esophagus     Biliary dyskinesia     Diverticulitis     Colon cancer screening     Prolapsed internal hemorrhoids     Migraine     Other specified postprocedural states     Pain of foot     Arthralgia of hip     Personal history of other venous thrombosis and embolism     Shoulder pain     Other specified diseases of gallbladder     Abdominal pain       Past Surgical History:   Procedure Laterality Date     ABDOMINOPLASTY        SECTION      x2     COLONOSCOPY - HIM  SCAN  2011    Colonoscopy 2011     COLONOSCOPY - HIM SCAN  2018    Procedure:  Colonoscopy diagnostic with polypectomy and biopsies;  Surgeon:  Danelle Swift MD;  Location:  [facility] OR     COLONOSCOPY - HIM SCAN  2007    Colonoscopy, flex, diagnostic     DILATE CERVIX, HYSTEROSCOPY, ABLATE ENDOMETRIUM, COMBINED N/A 2016    Procedure: COMBINED DILATE CERVIX, HYSTEROSCOPY, ABLATE ENDOMETRIUM;  Surgeon: Pacheco Curry MD;  Location: [facility] OR     ESOPHAGOGASTRODUODENOSCOPY      negative     ESOPHAGOSCOPY, GASTROSCOPY, DUODENOSCOPY (EGD), COMBINED N/A 2020    Procedure: Upper Endoscopy with biopsy;  Surgeon: Son Cooper MD;  Location: [facility] OR     ORTHOPEDIC SURGERY Left     rotator cuff repair and bicep tendon repair     ZZC BREAST AUGMENTATION       ZZC LAP CHOLECYSTOSOMY         Current Outpatient Medications   Medication     budesonide (PULMICORT) 0.5 MG/2ML neb solution     COLLAGEN PO     fluticasone (FLONASE) 50 MCG/ACT nasal spray     omeprazole (PRILOSEC) 40 MG DR capsule     triamcinolone (KENALOG) 0.1 % external ointment     No current facility-administered medications for this visit.          Allergies   Allergen Reactions     Augmentin Diarrhea     C diff     Ciprofloxacin Unknown     Tingling in feet     Doxycycline Unknown     Flagyl [Metronidazole] Other (See Comments)     Food      Mangoes cause rash     Sulfamethoxazole W/Trimethoprim Rash       History reviewed. No pertinent family history.    Social History     Socioeconomic History     Marital status:      Spouse name: None     Number of children: None     Years of education: None     Highest education level: None   Occupational History     None   Tobacco Use     Smoking status: Former Smoker     Years: 3.00     Types: Cigarettes     Quit date:      Years since quittin.5     Smokeless tobacco: Never Used   Substance and Sexual Activity     Alcohol use: Yes     Alcohol/week: 0.0 standard drinks      Comment: Occassional     Drug use: No     Sexual activity: None   Other Topics Concern     Parent/sibling w/ CABG, MI or angioplasty before 65F 55M? Not Asked   Social History Narrative     None     Social Determinants of Health     Financial Resource Strain:      Difficulty of Paying Living Expenses:    Food Insecurity:      Worried About Running Out of Food in the Last Year:      Ran Out of Food in the Last Year:    Transportation Needs:      Lack of Transportation (Medical):      Lack of Transportation (Non-Medical):    Physical Activity:      Days of Exercise per Week:      Minutes of Exercise per Session:    Stress:      Feeling of Stress :    Social Connections:      Frequency of Communication with Friends and Family:      Frequency of Social Gatherings with Friends and Family:      Attends [employer] Services:      Active Member of Clubs or Organizations:      Attends Club or Organization Meetings:      Marital Status:    Intimate Partner Violence:      Fear of Current or Ex-Partner:      Emotionally Abused:      Physically Abused:      Sexually Abused:        ROS: 10 point ROS neg other than the symptoms noted above in the HPI.  EXAM  Constitutional: healthy, alert and no distress    Psychiatric: mentation appears normal and affect normal/bright    VASCULAR:  -Dorsalis pedis pulse +2/4 b/l  -Posterior tibial pulse +2/4 b/l  -Capillary refill time < 3 seconds to b/l hallux  NEURO:  -Light touch sensation intact to b/l plantar forefoot  DERM:  -Skin temperature, texture and turgor WNL b/l  -Mild hyperkeratotic lesion to the distal plantar tip of the LEFT 2nd digit  MSK:  -Pain on palpation to LEFT dorsal lateral and dorsal medial 1st MTPJ (lateral more than medial)  -Pain with end ROM with DORSIFLEXION and PLANTARFLEXION of the 1st MTPJ of the LEFT fot    -LEFT 1st MTPJ has mild bony prominence on the dorsal and medial aspect of the 1st metatarsal head  ---ROM limited to < 20 degrees DORSIFLEXION with loading  the forefot    -Mild PLANTARFLEXION of the DIPJ of the LEFT foot DIPJ    -Muscle strength of ankles +5/5 for dorsiflexion, plantarflexion, ABDUction and ADDuction b/l    -Ankle joint passive ROM within normal limits except for dorsiflexion:    Dorsiflexion, RIGHT Straight knee 0 degrees    Dorsiflexion, LEFT Straight knee 0 degrees    -Moderate decrease in arch height while patient is NWB    RIGHT FOOT RADIOGRAPH 07/08/2021  IMPRESSION: 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 Treatment options discussed with no educational barriers noted.    Previous injections in the LEFT 1st MTPJ:  01/17/2022 October, 2021 by OA  July, 2021    -Discussed hallux rigidus and hallux valgus procedures. If the patient stops having pain relief from injections and the great toe joint bothers her on a daily basis, that is when it is advised to consider surgical options. The surgical procedure is dependent on her x-ray and her long term goals. Discussed risks and benefits of a 1st TMTJ fusion, 1st MTPJ fusion, an osteotomy of the 1st metatarsal and a Cheilectomy. At this time, the injections are helping her pain so she will continue with injections as needed. If her pain worsens within the next two months because of her amount of gardening, then a Ketorolac injection in the joint may be considered.    -Steroid Injection x 1 into the first metatarsophalangeal  joint of the LEFT foot: Obtained written and verbal consent from patient for a steroid injection into the  1s MTPJ of the Left foot. Reviewed risks and benefits of the injection. Informed patient that the injection may decrease pain long-term, short-term, or not at all. Patient in agreement with an injection today in an effort to slow or reverse the chronic inflammatory process and pain in the foot.   ---Patient signed informed consent and a time-out was performed and there was verification of correct patient, procedure and laterality.  ---Prepped the injection site with an alcohol swab.  ---Injected a total of 0.5 mL (20mg) Kenalog and 0.5mL of 2% Lidocaine plain into the dorsal lateral and dorsal medial 1st MTPJ. Patient had this same steroid injected into the joint space in the past with successful pain relief. Patient tolerated the injection well.      -Orthotics: Patient tried [facility] and tried an orthotic with a Mcknight's extension, but it made her pain worse.    -Patient in agreement with the above treatment plan and all of patient's questions were answered.      RTC as needed per patient request        Nya Bui, JOSIAS, DPTREVA

## 2022-05-06 ENCOUNTER — OFFICE VISIT (OUTPATIENT)
Dept: PODIATRY | Facility: OTHER | Age: 52
End: 2022-05-06
Attending: PODIATRIST
Payer: COMMERCIAL

## 2022-05-06 VITALS
HEART RATE: 81 BPM | OXYGEN SATURATION: 98 % | SYSTOLIC BLOOD PRESSURE: 149 MMHG | RESPIRATION RATE: 16 BRPM | DIASTOLIC BLOOD PRESSURE: 89 MMHG | TEMPERATURE: 98.8 F

## 2022-05-06 DIAGNOSIS — M62.462 GASTROCNEMIUS EQUINUS OF LEFT LOWER EXTREMITY: ICD-10-CM

## 2022-05-06 DIAGNOSIS — S92.351K CLOSED DISPLACED FRACTURE OF FIFTH METATARSAL BONE OF RIGHT FOOT WITH NONUNION: ICD-10-CM

## 2022-05-06 DIAGNOSIS — M62.461 GASTROCNEMIUS EQUINUS OF RIGHT LOWER EXTREMITY: ICD-10-CM

## 2022-05-06 DIAGNOSIS — M79.672 LEFT FOOT PAIN: ICD-10-CM

## 2022-05-06 DIAGNOSIS — M20.5X2 ACQUIRED MALLET TOE OF LEFT FOOT: ICD-10-CM

## 2022-05-06 DIAGNOSIS — M20.22 HALLUX RIGIDUS OF LEFT FOOT: Primary | ICD-10-CM

## 2022-05-06 DIAGNOSIS — L84 CALLUS OF FOOT: ICD-10-CM

## 2022-05-06 DIAGNOSIS — M21.41 PES PLANUS OF BOTH FEET: ICD-10-CM

## 2022-05-06 DIAGNOSIS — M21.42 PES PLANUS OF BOTH FEET: ICD-10-CM

## 2022-05-06 PROCEDURE — 20600 DRAIN/INJ JOINT/BURSA W/O US: CPT | Mod: LT | Performed by: PODIATRIST

## 2022-05-06 RX ORDER — TRIAMCINOLONE ACETONIDE 40 MG/ML
20 INJECTION, SUSPENSION INTRA-ARTICULAR; INTRAMUSCULAR ONCE
Status: COMPLETED | OUTPATIENT
Start: 2022-05-06 | End: 2022-05-06

## 2022-05-06 RX ORDER — LIDOCAINE HYDROCHLORIDE 20 MG/ML
0.5 INJECTION, SOLUTION INFILTRATION; PERINEURAL ONCE
Status: COMPLETED | OUTPATIENT
Start: 2022-05-06 | End: 2022-05-06

## 2022-05-06 RX ADMIN — TRIAMCINOLONE ACETONIDE 20 MG: 40 INJECTION, SUSPENSION INTRA-ARTICULAR; INTRAMUSCULAR at 08:15

## 2022-05-06 RX ADMIN — LIDOCAINE HYDROCHLORIDE 0.5 ML: 20 INJECTION, SOLUTION INFILTRATION; PERINEURAL at 08:18

## 2022-05-06 ASSESSMENT — PAIN SCALES - GENERAL: PAINLEVEL: MILD PAIN (2)

## 2022-05-06 NOTE — PATIENT INSTRUCTIONS
Thank you for allowing  and our Podiatry team to participate in your care. Please call our office at [phone] with scheduling questions or with any other questions or concerns.

## 2022-05-06 NOTE — NURSING NOTE
"Chief Complaint   Patient presents with     Musculoskeletal Problem     Left foot, great toe       Initial BP (!) 149/89 (BP Location: Left arm, Patient Position: Sitting, Cuff Size: Adult Regular)   Pulse 81   Temp 98.8  F (37.1  C) (Tympanic)   Resp 16   LMP 04/11/2016 (LMP Unknown)   SpO2 98%  Estimated body mass index is 28.67 kg/m  as calculated from the following:    Height as of 2/16/22: 1.626 m (5' 4\").    Weight as of 3/30/22: 75.8 kg (167 lb).  Medication Reconciliation: complete  Arianna Jenkins LPN  "

## 2022-05-06 NOTE — PROGRESS NOTES
Clinic Administered Medication Documentation    Administrations This Visit     Lidocaine 2 % injection     Admin Date  05/06/2022 Action  Given Dose  0.5 mL Route  Other Site   Administered By  Arianna Jenkins LPN    Ordering Provider: Nya Bui DPM    Patient Supplied?: No          triamcinolone (KENALOG-40) injection 20 mg     Admin Date  05/06/2022 Action  Given Dose  20 mg Route  INTRA-ARTICULAR Site  Left Foot Administered By  Arianna Jenkins LPN    Ordering Provider: Nya Bui DPM    Patient Supplied?: No                STEROID INJECTION OF THE LEFT FIRST METATARSOPHALANGEAL JOINT

## 2022-06-12 ENCOUNTER — HEALTH MAINTENANCE LETTER (OUTPATIENT)
Age: 52
End: 2022-06-12

## 2022-07-12 NOTE — ED NOTES
"Pt presents with complaints of constipation. Patient had rotator cuff surgery this past Monday and has been taking Percocet every 4 hours since then. Patient has tried docusate sodium and a suppository at home without relief. Patient also reports right side lower abdominal pain, 2/10 that aches. Patient does have history of diverticulitis and presents to the ER today because she wants to get \"checked out.\" Decreased appetite, but adequate fluid intake. No problems with bladder. Pt's  at bedside, call light within reach.   " Detail Level: None

## 2022-08-25 NOTE — ANESTHESIA PREPROCEDURE EVALUATION
Anesthesia Pre-Procedure Evaluation    Patient: Veronica Ansari   MRN: [id number] : 1970        Preoperative Diagnosis: Diverticulitis [K57.92]   Procedure : Procedure(s):  COLONOSCOPY with possible hemorrhoidal banding     Past Medical History:   Diagnosis Date     Cervicalgia      Deviated nasal septum      Embolism and thrombosis (H)     unspecified site     Fibrocystic breast disease      Migraine      Prothrombin mutation (H)      Sinusitis       Past Surgical History:   Procedure Laterality Date     ABDOMINOPLASTY       C BREAST AUGMENTATION       C LAP CHOLECYSTOSOMY        SECTION      x2     COLONOSCOPY - HIM SCAN  2011    Colonoscopy 2011     COLONOSCOPY - HIM SCAN  2018    Procedure:  Colonoscopy diagnostic with polypectomy and biopsies;  Surgeon:  Danelle Swift MD;  [facility]     COLONOSCOPY - HIM SCAN  2007    Colonoscopy, flex, diagnostic     DILATE CERVIX, HYSTEROSCOPY, ABLATE ENDOMETRIUM, COMBINED N/A 2016    Procedure: COMBINED DILATE CERVIX, HYSTEROSCOPY, ABLATE ENDOMETRIUM;  Surgeon: Pacheco Curry MD;  Location: [facility] OR     ESOPHAGOGASTRODUODENOSCOPY      negative     ESOPHAGOSCOPY, GASTROSCOPY, DUODENOSCOPY (EGD), COMBINED N/A 2020    Procedure: Upper Endoscopy with biopsy;  Surgeon: Son Cooper MD;  Location: [facility] OR      Allergies   Allergen Reactions     Augmentin Diarrhea     Doxycycline Unknown     Flagyl [Metronidazole] Other (See Comments)     Food      Mangoes cause rash     Sulfamethoxazole W/Trimethoprim Rash      Social History     Tobacco Use     Smoking status: Former Smoker     Years: 3.00     Types: Cigarettes     Quit date:      Years since quittin.3     Smokeless tobacco: Never Used   Substance Use Topics     Alcohol use: Yes     Alcohol/week: 0.0 standard drinks     Comment: Occassional      Wt Readings from Last 1 Encounters:   21 73.5 kg (162 lb)        Anesthesia Evaluation            ROS/MED  Daily Note     Today's date: 2022  Patient name: Michael Barton  : 1944  MRN: [id number]  Referring provider: Sofi Gerber, PT  Dx:   Encounter Diagnosis     ICD-10-CM    1  Acute left-sided low back pain, unspecified whether sciatica present  M54 50                   Subjective: patient reports she is doing well, no issues since last treatment      Objective: See treatment diary below      Assessment: Tolerated treatment well  Patient exhibited good technique with therapeutic exercises and would benefit from continued PT  Continued light extension and progress core strengthening activities  No pain throughout treatment      Plan: Progress treatment as tolerated    discharge next week if no pain     Precautions: none    Defers prone lying  Manuals           Nerve mobilizations, S1  SY SY                                    Nu step  6' lvl 3 8' lvl 5          Neuro Re-Ed             Bracing with fallout/kickout etc             Bracing with pball push downs             Bracing with march   15ea                                                              Ther Ex             Glut stretch N :15x5 :15x5 ea          Sciatic nn flossing supine 20 20 20ea          Bridges  20 20           clamshells 20 20 :05x20          Lumbar ext in standing 10 15           Bridge with ER  20 :05x20          Bridge with IR  20 :05x20          LTR   :[id number]                                                              Ther Activity                                       Gait Training                                       Modalities HX  ENT/Pulmonary: Comment: Deviated nasal septum  sinusitis    (+) allergic rhinitis, tobacco use,     Neurologic:     (+) migraines,     Cardiovascular:       METS/Exercise Tolerance:     Hematologic: Comments: Prothrombin [id number] mutation  Iron deficiency  Hx DVT    (+) History of blood clots,     Musculoskeletal:       GI/Hepatic: Comment: History of ulcer esophageal  Biliary dyskinesa  Recent diverticulitis on abx    (+) GERD, bowel prep,     Renal/Genitourinary:       Endo:       Psychiatric/Substance Use:       Infectious Disease:       Malignancy:       Other:               OUTSIDE LABS:  CBC:   Lab Results   Component Value Date    WBC 12.4 (H) 04/26/2021    WBC 9.7 04/13/2021    HGB 13.6 04/26/2021    HGB 14.6 04/13/2021    HCT 42.3 04/26/2021    HCT 44.7 04/13/2021     04/26/2021     04/13/2021     BMP:   Lab Results   Component Value Date     04/13/2021     04/11/2021    POTASSIUM 3.8 04/13/2021    POTASSIUM 3.7 04/11/2021    CHLORIDE 103 04/13/2021    CHLORIDE 105 04/11/2021    CO2 28 04/13/2021    CO2 26 04/11/2021    BUN 8 04/13/2021    BUN 9 04/11/2021    CR 0.71 04/13/2021    CR 0.61 04/11/2021    GLC 87 04/13/2021     (H) 04/11/2021     COAGS:   Lab Results   Component Value Date    PTT 28 03/12/2020    INR 1.24 (H) 03/22/2020     POC:   Lab Results   Component Value Date    HCG Negative 04/11/2021     HEPATIC:   Lab Results   Component Value Date    ALBUMIN 3.6 04/13/2021    PROTTOTAL 7.7 04/13/2021    ALT 17 04/13/2021    AST 5 04/13/2021    ALKPHOS 71 04/13/2021    BILITOTAL 0.6 04/13/2021     OTHER:   Lab Results   Component Value Date    A1C 5.3 03/24/2021    BAO 8.9 04/13/2021    MAG 2.1 03/24/2021    LIPASE 102 04/13/2021    CRP 3.2 04/26/2021       Anesthesia Plan    ASA Status:  3                    Consents            Postoperative Care            Comments:    Jamison Alex/5/21            MAHAD Szymanski CRNA

## 2022-08-31 DIAGNOSIS — J32.9 CHRONIC CONGESTION OF PARANASAL SINUS: ICD-10-CM

## 2022-08-31 RX ORDER — FLUTICASONE PROPIONATE 50 MCG
SPRAY, SUSPENSION (ML) NASAL
Qty: 16 G | Refills: 0 | Status: SHIPPED | OUTPATIENT
Start: 2022-08-31

## 2022-08-31 NOTE — TELEPHONE ENCOUNTER
Flonase      Last Written Prescription Date:  1/10/22  Last Fill Quantity: 16 g,   # refills: 0  Last Office Visit: 3/30/22  Future Office visit:    Next 5 appointments (look out 90 days)    Sep 01, 2022  2:30 PM  (Arrive by 2:15 PM)  PHYSICAL with Ailyn Parker NP  [facility] [place] ([facility] [place] ) [address]  [place] [address]  [phone]           Routing refill request to provider for review/approval because:

## 2022-09-09 ENCOUNTER — OFFICE VISIT (OUTPATIENT)
Dept: OBGYN | Facility: OTHER | Age: 52
End: 2022-09-09
Attending: NURSE PRACTITIONER
Payer: COMMERCIAL

## 2022-09-09 VITALS
HEIGHT: 64 IN | OXYGEN SATURATION: 98 % | HEART RATE: 82 BPM | SYSTOLIC BLOOD PRESSURE: 115 MMHG | WEIGHT: 163 LBS | BODY MASS INDEX: 27.83 KG/M2 | RESPIRATION RATE: 12 BRPM | DIASTOLIC BLOOD PRESSURE: 70 MMHG

## 2022-09-09 DIAGNOSIS — N95.1 VASOMOTOR SYMPTOMS DUE TO MENOPAUSE: ICD-10-CM

## 2022-09-09 DIAGNOSIS — Z12.4 SCREENING FOR CERVICAL CANCER: ICD-10-CM

## 2022-09-09 DIAGNOSIS — Z01.419 WELL WOMAN EXAM WITH ROUTINE GYNECOLOGICAL EXAM: ICD-10-CM

## 2022-09-09 LAB — FSH SERPL IRP2-ACNC: 73.8 MIU/ML

## 2022-09-09 PROCEDURE — 87624 HPV HI-RISK TYP POOLED RSLT: CPT | Performed by: NURSE PRACTITIONER

## 2022-09-09 PROCEDURE — 83001 ASSAY OF GONADOTROPIN (FSH): CPT | Performed by: NURSE PRACTITIONER

## 2022-09-09 PROCEDURE — G0123 SCREEN CERV/VAG THIN LAYER: HCPCS | Performed by: NURSE PRACTITIONER

## 2022-09-09 PROCEDURE — 36415 COLL VENOUS BLD VENIPUNCTURE: CPT | Performed by: NURSE PRACTITIONER

## 2022-09-09 PROCEDURE — 99396 PREV VISIT EST AGE 40-64: CPT | Performed by: NURSE PRACTITIONER

## 2022-09-09 ASSESSMENT — PATIENT HEALTH QUESTIONNAIRE - PHQ9: SUM OF ALL RESPONSES TO PHQ QUESTIONS 1-9: 4

## 2022-09-09 ASSESSMENT — PAIN SCALES - GENERAL: PAINLEVEL: NO PAIN (0)

## 2022-09-09 NOTE — PROGRESS NOTES
CC:  Annual exam  HPI:  Veronica Ansari is a 52 year old female P2 Patient's last menstrual period was 2016 (lmp unknown).  Hx of uterine ablation.  Increase in vasomotor sx for the past 6 months.  Discussed options for home management. Mammogram is up to date.    No other c/o.      Past GYN history:  No STD history  STI testing offered?  Declined       Last PAP smear:  Normal  Mammograms up to date: yes      Past Medical History:   Diagnosis Date     Cervicalgia      Deviated nasal septum      Embolism and thrombosis (H)     unspecified site     Fibrocystic breast disease      Migraine      Prothrombin mutation (H)      Sinusitis        Past Surgical History:   Procedure Laterality Date     ABDOMINOPLASTY        SECTION      x2     COLONOSCOPY - HIM SCAN  2011    Colonoscopy 2011     COLONOSCOPY - HIM SCAN  2018    Procedure:  Colonoscopy diagnostic with polypectomy and biopsies;  Surgeon:  Danelle Swift MD;  Location:  [facility] OR     COLONOSCOPY - HIM SCAN  2007    Colonoscopy, flex, diagnostic     DILATE CERVIX, HYSTEROSCOPY, ABLATE ENDOMETRIUM, COMBINED N/A 2016    Procedure: COMBINED DILATE CERVIX, HYSTEROSCOPY, ABLATE ENDOMETRIUM;  Surgeon: Pacheco Curry MD;  Location: [facility]     ESOPHAGOGASTRODUODENOSCOPY      negative     ESOPHAGOSCOPY, GASTROSCOPY, DUODENOSCOPY (EGD), COMBINED N/A 2020    Procedure: Upper Endoscopy with biopsy;  Surgeon: Son Cooper MD;  Location: [facility] OR     ORTHOPEDIC SURGERY Left     rotator cuff repair and bicep tendon repair     ZZC BREAST AUGMENTATION       ZZC LAP CHOLECYSTOSOMY         No family history on file.    Current Outpatient Medications   Medication Sig Dispense Refill     cholecalciferol (VITAMIN D3) 25 mcg (1000 units) capsule Take 1 capsule by mouth daily       fluticasone (FLONASE) 50 MCG/ACT nasal spray SHAKE LIQUID AND USE 2 SPRAYS IN EACH NOSTRIL DAILY 16 g 0     omeprazole (PRILOSEC) 40 MG DR capsule Take 1  "capsule (40 mg) by mouth daily 90 capsule 1     budesonide (PULMICORT) 0.5 MG/2ML neb solution Squirt entire vial into freedom med saline solution, mix, and irrigate each nostril until entire bottle empty.  Do this twice daily. (Patient not taking: Reported on 9/9/2022) 200 mL 11       Allergies: Augmentin, Ciprofloxacin, Doxycycline, Flagyl [metronidazole], Food, and Sulfamethoxazole w/trimethoprim    ROS:  CONSTITUTIONAL:NEGATIVE for fever, chills, change in weight  BREAST: NEGATIVE for masses, tenderness or discharge  : negative for, dysparunia and vaginal discharge  ENDOCRINE: NEGATIVE for temperature intolerance, skin/hair changes  PSYCHIATRIC: NEGATIVE for changes in mood or affect    EXAM:  Blood pressure 115/70, pulse 82, resp. rate 12, height 1.626 m (5' 4\"), weight 73.9 kg (163 lb), last menstrual period 04/11/2016, SpO2 98 %.   BMI= Body mass index is 27.98 kg/m .  General - pleasant female in no acute distress.  Neck - supple without lymphadenopathy or thyromegaly.  Lungs - clear to auscultation bilaterally.  Heart - regular rate and rhythm without murmur.  Breast - no nodularity, asymmetry or nipple discharge bilaterally.  Abdomen - soft, nontender, nondistended, no hepatosplenomegaly.  Pelvic - EG: normal adult female, BUS: within normal limits, Vagina: well rugated, no discharge, Cervix: no lesions or CMT, Uterus: firm, normal sized and nontender, Adnexae: no masses or tenderness.  Rectovaginal - deferred.  Musculoskeletal - no gross deformities.  Neurological - normal strength, sensation, and mental status.      ASSESSMENT/PLAN:  (Z01.419) Well woman exam with routine gynecological exam  Comment:   Plan: return annually and as needed    (Z12.4) Screening for cervical cancer  Comment:   Plan: A pap thin layer screen with  HPV - recommended        age 30 - 65 years (select HPV order below)            (N95.1) Vasomotor symptoms due to menopause  Comment:   Plan: Follicle stimulating hormone        "       Discussed exercise and healthy eating, including calcium intake.  She should return to the clinic in one year, or sooner if problems arise.

## 2022-09-09 NOTE — NURSING NOTE
"Chief Complaint   Patient presents with     Gyn Exam     Annual exam       Initial /70 (BP Location: Right arm, Patient Position: Sitting, Cuff Size: Adult Large)   Pulse 82   Resp 12   Ht 1.626 m (5' 4\")   Wt 73.9 kg (163 lb)   LMP 04/11/2016 (LMP Unknown)   SpO2 98%   BMI 27.98 kg/m   Estimated body mass index is 27.98 kg/m  as calculated from the following:    Height as of this encounter: 1.626 m (5' 4\").    Weight as of this encounter: 73.9 kg (163 lb).  Medication Reconciliation: complete  Lilibeth Suarez, RN    "

## 2022-09-19 LAB
BKR LAB AP GYN ADEQUACY: NORMAL
BKR LAB AP GYN INTERPRETATION: NORMAL
BKR LAB AP HPV REFLEX: NORMAL
BKR LAB AP PREVIOUS ABNORMAL: NORMAL
PATH REPORT.COMMENTS IMP SPEC: NORMAL
PATH REPORT.COMMENTS IMP SPEC: NORMAL
PATH REPORT.RELEVANT HX SPEC: NORMAL

## 2022-09-20 ENCOUNTER — APPOINTMENT (OUTPATIENT)
Dept: ULTRASOUND IMAGING | Facility: HOSPITAL | Age: 52
End: 2022-09-20
Attending: PHYSICIAN ASSISTANT
Payer: COMMERCIAL

## 2022-09-20 ENCOUNTER — HOSPITAL ENCOUNTER (EMERGENCY)
Facility: HOSPITAL | Age: 52
Discharge: HOME | End: 2022-09-20
Attending: PHYSICIAN ASSISTANT | Admitting: PHYSICIAN ASSISTANT
Payer: COMMERCIAL

## 2022-09-20 VITALS
TEMPERATURE: 98 F | RESPIRATION RATE: 16 BRPM | SYSTOLIC BLOOD PRESSURE: 155 MMHG | OXYGEN SATURATION: 97 % | HEART RATE: 66 BPM | DIASTOLIC BLOOD PRESSURE: 86 MMHG

## 2022-09-20 DIAGNOSIS — M79.661 RIGHT CALF PAIN: ICD-10-CM

## 2022-09-20 PROCEDURE — G0463 HOSPITAL OUTPT CLINIC VISIT: HCPCS | Mod: 25

## 2022-09-20 PROCEDURE — 99213 OFFICE O/P EST LOW 20 MIN: CPT | Performed by: PHYSICIAN ASSISTANT

## 2022-09-20 PROCEDURE — 93971 EXTREMITY STUDY: CPT | Mod: RT

## 2022-09-20 ASSESSMENT — ACTIVITIES OF DAILY LIVING (ADL): ADLS_ACUITY_SCORE: 33

## 2022-09-20 NOTE — DISCHARGE INSTRUCTIONS
Ultrasound was negative for evidence of DVT.    Follow-up in the clinic for persistent symptoms.    Return here for any other questions or concerns.

## 2022-09-20 NOTE — ED PROVIDER NOTES
History     Chief Complaint   Patient presents with     Leg Pain     The history is provided by the patient.     Veronica Ansari is a 52 year old female who presented to the urgent care ambulatory for evaluation of a 2-week history of right calf pain.  No injury.  No bruising.  The patient carries a history of remote DVT.  She also has a history of prothrombin mutation.  No shortness of breath.  No cough.    Allergies:  Allergies   Allergen Reactions     Augmentin Diarrhea     C diff     Ciprofloxacin Unknown     Tingling in feet     Doxycycline Unknown     Flagyl [Metronidazole] Other (See Comments)     Food      [place]angoes cause rash     Sulfamethoxazole W/Trimethoprim Rash       Problem List:    Patient Active Problem List    Diagnosis Date Noted     Abdominal pain 12/08/2021     Priority: Medium     Colon cancer screening 06/08/2021     Priority: Medium     Formatting of this note might be different from the original.  Added automatically from request for surgery [id number]       Prolapsed internal hemorrhoids 06/08/2021     Priority: Medium     Formatting of this note might be different from the original.  Added automatically from request for surgery [id number]    Formatting of this note might be different from the original.  Formatting of this note might be different from the original.  Added automatically from request for surgery [id number]  Formatting of this note might be different from the original.  Formatting of this note might be different from the original.  Added automatically from request for surgery [id number]       Diverticulitis 05/05/2021     Priority: Medium     Added automatically from request for surgery [id number]       Ulcer of esophagus 05/21/2020     Priority: Medium     Added automatically from request for surgery [id number]    Formatting of this note might be different from the original.  Formatting of this note might be different from the original.  Added automatically from request for surgery  [id number]  Formatting of this note might be different from the original.  Added automatically from request for surgery [id number]       Biliary dyskinesia 11/14/2018     Priority: Medium     Other specified diseases of gallbladder 11/14/2018     Priority: Medium     S/P abdominoplasty 04/02/2018     Priority: Medium     Overview:   complicated by right calf DVT       Other specified postprocedural states 04/02/2018     Priority: Medium     Formatting of this note might be different from the original.  Formatting of this note might be different from the original.  complicated by right calf DVT  Formatting of this note might be different from the original.  Overview:   complicated by right calf DVT       Irritable bowel syndrome with diarrhea 11/14/2017     Priority: Medium     Menorrhagia 04/19/2016     Priority: Medium     Pain of foot 04/02/2015     Priority: Medium     Last Assessment & Plan:   Formatting of this note might be different from the original.   Chronic arthritis. Receives injections as needed with PCP. Well managed       Iron deficiency 05/23/2014     Priority: Medium     Overview:   Without anemia       Cervicalgia 09/05/2013     Priority: Medium     Shoulder pain 06/20/2013     Priority: Medium     Prothrombin [id number] mutation (H) 03/02/2013     Priority: Medium     Overview:   Heterozygous       Migraine 12/26/2012     Priority: Medium     Formatting of this note might be different from the original.  IMO Update       Arthralgia of hip 04/14/2011     Priority: Medium     H/O deep venous thrombosis 03/10/2007     Priority: Medium     Overview:   Post-operative LE DVT       Personal history of other venous thrombosis and embolism 03/10/2007     Priority: Medium     Formatting of this note might be different from the original.  Formatting of this note might be different from the original.  Post-operative LE DVT  Formatting of this note might be different from the original.  Overview:   Post-operative LE  DVT    Last Assessment & Plan:   Formatting of this note might be different from the original.  Post surgery was on anticoagulant afterwards. Off anticoagulants since        Fibrocystic breast changes 2006     Priority: Medium     Sinusitis, chronic 2000     Priority: Medium     Overview:   Chronic  IMO Update 10/11       Deviated nasal septum 2000     Priority: Medium        Past Medical History:    Past Medical History:   Diagnosis Date     Cervicalgia      Deviated nasal septum      Embolism and thrombosis (H)      Fibrocystic breast disease      Migraine      Prothrombin mutation (H)      Sinusitis        Past Surgical History:    Past Surgical History:   Procedure Laterality Date     ABDOMINOPLASTY        SECTION      x2     COLONOSCOPY - HIM SCAN  2011    Colonoscopy 2011     COLONOSCOPY - HIM SCAN  2018    Procedure:  Colonoscopy diagnostic with polypectomy and biopsies;  Surgeon:  Danelle Swift MD;  Location:  [facility] OR     COLONOSCOPY - HIM SCAN  2007    Colonoscopy, flex, diagnostic     DILATE CERVIX, HYSTEROSCOPY, ABLATE ENDOMETRIUM, COMBINED N/A 2016    Procedure: COMBINED DILATE CERVIX, HYSTEROSCOPY, ABLATE ENDOMETRIUM;  Surgeon: Pacheco Curry MD;  Location: [facility] OR     ESOPHAGOGASTRODUODENOSCOPY      negative     ESOPHAGOSCOPY, GASTROSCOPY, DUODENOSCOPY (EGD), COMBINED N/A 2020    Procedure: Upper Endoscopy with biopsy;  Surgeon: Son Cooper MD;  Location: [facility] OR     ORTHOPEDIC SURGERY Left     rotator cuff repair and bicep tendon repair     ZZC BREAST AUGMENTATION       ZZC LAP CHOLECYSTOSOMY         Family History:    History reviewed. No pertinent family history.    Social History:  Marital Status:   [2]  Social History     Tobacco Use     Smoking status: Former Smoker     Years: 3.00     Types: Cigarettes     Quit date:      Years since quittin.7     Smokeless tobacco: Never Used   Substance Use Topics      Alcohol use: Yes     Alcohol/week: 0.0 standard drinks     Comment: Occassional     Drug use: No        Medications:    budesonide (PULMICORT) 0.5 MG/2ML neb solution  cholecalciferol (VITAMIN D3) 25 mcg (1000 units) capsule  fluticasone (FLONASE) 50 MCG/ACT nasal spray  omeprazole (PRILOSEC) 40 MG DR capsule          Review of Systems   Musculoskeletal:        See HPI       Physical Exam   BP: 155/86  Pulse: 66  Temp: 98  F (36.7  C)  Resp: 16  SpO2: 97 %      Physical Exam  Vitals and nursing note reviewed.   Constitutional:       General: She is not in acute distress.     Appearance: Normal appearance. She is normal weight. She is not ill-appearing, toxic-appearing or diaphoretic.      Comments: Pleasant and talkative 52-year-old female found seated upright on the exam chair in no distress.   Musculoskeletal:      Comments: No profound or significant edema, ecchymosis, or erythema noted to the right lower extremity.  She reports pain in the mid calf.   Skin:     General: Skin is warm and dry.      Capillary Refill: Capillary refill takes less than 2 seconds.   Neurological:      General: No focal deficit present.      Mental Status: She is alert and oriented to person, place, and time.   Psychiatric:         Mood and Affect: Mood normal.         ED Course                 Procedures              Critical Care time:  none               Results for orders placed or performed during the hospital encounter of 09/20/22 (from the past 24 hour(s))   US Lower Extremity Venous Duplex Right    Narrative    Exam: US LOWER EXTREMITY VENOUS DUPLEX RIGHT    Exam reason: Calf pain. Hx of DVT. Prothrombin mutation    Comparison: None.    TECHNIQUE: Deep veins were evaluated using B-mode, color flow Doppler  and pulse wave spectral Doppler.      FINDINGS:    The common femoral, saphenofemoral junction, femoral and popliteal  veins are patent. There is no evidence of venous thrombus. These  vessels exhibited normal  compressibility.    Flow is present in the posterior tibial and peroneal veins. These  vessels are compressible.       Impression    IMPRESSION:    No acute deep venous thrombosis.           DOMINIC JOSUE MD         SYSTEM ID:  [id number]       Medications - No data to display    Assessments & Plan (with Medical Decision Making)   Unremarkable ultrasound of the right lower extremity.  Follow-up in the clinic.  There is no evidence of emergent  etiology the patient's symptoms at this time.    This document was prepared using a combination of typing and voice generated software.  While every attempt was made for accuracy, spelling and grammatical errors may exist.  I have reviewed the nursing notes.    I have reviewed the findings, diagnosis, plan and need for follow up with the patient.       New Prescriptions    No medications on file       Final diagnoses:   Right calf pain       9/20/2022   [facility]     Charito Duque PA-C  09/20/22 120

## 2022-09-22 LAB
HUMAN PAPILLOMA VIRUS 16 DNA: NEGATIVE
HUMAN PAPILLOMA VIRUS 18 DNA: NEGATIVE
HUMAN PAPILLOMA VIRUS FINAL DIAGNOSIS: NORMAL
HUMAN PAPILLOMA VIRUS OTHER HR: NEGATIVE

## 2022-10-23 ENCOUNTER — HEALTH MAINTENANCE LETTER (OUTPATIENT)
Age: 52
End: 2022-10-23

## 2022-11-01 ENCOUNTER — HOSPITAL ENCOUNTER (EMERGENCY)
Facility: HOSPITAL | Age: 52
Discharge: HOME | End: 2022-11-01
Attending: STUDENT IN AN ORGANIZED HEALTH CARE EDUCATION/TRAINING PROGRAM | Admitting: STUDENT IN AN ORGANIZED HEALTH CARE EDUCATION/TRAINING PROGRAM
Payer: COMMERCIAL

## 2022-11-01 ENCOUNTER — APPOINTMENT (OUTPATIENT)
Dept: CT IMAGING | Facility: HOSPITAL | Age: 52
End: 2022-11-01
Attending: STUDENT IN AN ORGANIZED HEALTH CARE EDUCATION/TRAINING PROGRAM
Payer: COMMERCIAL

## 2022-11-01 VITALS
HEART RATE: 83 BPM | OXYGEN SATURATION: 97 % | DIASTOLIC BLOOD PRESSURE: 92 MMHG | RESPIRATION RATE: 16 BRPM | TEMPERATURE: 98.9 F | SYSTOLIC BLOOD PRESSURE: 142 MMHG

## 2022-11-01 DIAGNOSIS — K57.32 DIVERTICULITIS OF COLON: ICD-10-CM

## 2022-11-01 LAB
ALBUMIN SERPL BCG-MCNC: 3.7 G/DL (ref 3.5–5.2)
ALBUMIN UR-MCNC: NEGATIVE MG/DL
ALP SERPL-CCNC: 77 U/L (ref 35–104)
ALT SERPL W P-5'-P-CCNC: 11 U/L (ref 10–35)
ANION GAP SERPL CALCULATED.3IONS-SCNC: 11 MMOL/L (ref 7–15)
APPEARANCE UR: CLEAR
AST SERPL W P-5'-P-CCNC: 17 U/L (ref 10–35)
BASOPHILS # BLD AUTO: 0 10E3/UL (ref 0–0.2)
BASOPHILS NFR BLD AUTO: 0 %
BILIRUB SERPL-MCNC: 0.9 MG/DL
BILIRUB UR QL STRIP: NEGATIVE
BUN SERPL-MCNC: 8.2 MG/DL (ref 6–20)
CALCIUM SERPL-MCNC: 8.7 MG/DL (ref 8.6–10)
CHLORIDE SERPL-SCNC: 102 MMOL/L (ref 98–107)
COLOR UR AUTO: ABNORMAL
CREAT SERPL-MCNC: 0.68 MG/DL (ref 0.51–0.95)
DEPRECATED HCO3 PLAS-SCNC: 24 MMOL/L (ref 22–29)
EOSINOPHIL # BLD AUTO: 0.1 10E3/UL (ref 0–0.7)
EOSINOPHIL NFR BLD AUTO: 1 %
ERYTHROCYTE [DISTWIDTH] IN BLOOD BY AUTOMATED COUNT: 12.2 % (ref 10–15)
FLUAV RNA SPEC QL NAA+PROBE: NEGATIVE
FLUBV RNA RESP QL NAA+PROBE: NEGATIVE
GFR SERPL CREATININE-BSD FRML MDRD: >90 ML/MIN/1.73M2
GLUCOSE SERPL-MCNC: 98 MG/DL (ref 70–99)
GLUCOSE UR STRIP-MCNC: NEGATIVE MG/DL
HCT VFR BLD AUTO: 44.4 % (ref 35–47)
HGB BLD-MCNC: 15 G/DL (ref 11.7–15.7)
HGB UR QL STRIP: ABNORMAL
HOLD SPECIMEN: NORMAL
IMM GRANULOCYTES # BLD: 0.1 10E3/UL
IMM GRANULOCYTES NFR BLD: 1 %
KETONES UR STRIP-MCNC: ABNORMAL MG/DL
LEUKOCYTE ESTERASE UR QL STRIP: NEGATIVE
LIPASE SERPL-CCNC: 24 U/L (ref 13–60)
LYMPHOCYTES # BLD AUTO: 1.4 10E3/UL (ref 0.8–5.3)
LYMPHOCYTES NFR BLD AUTO: 14 %
MCH RBC QN AUTO: 29.6 PG (ref 26.5–33)
MCHC RBC AUTO-ENTMCNC: 33.8 G/DL (ref 31.5–36.5)
MCV RBC AUTO: 88 FL (ref 78–100)
MONOCYTES # BLD AUTO: 1 10E3/UL (ref 0–1.3)
MONOCYTES NFR BLD AUTO: 10 %
MUCOUS THREADS #/AREA URNS LPF: PRESENT /LPF
NEUTROPHILS # BLD AUTO: 7.6 10E3/UL (ref 1.6–8.3)
NEUTROPHILS NFR BLD AUTO: 74 %
NITRATE UR QL: NEGATIVE
NRBC # BLD AUTO: 0 10E3/UL
NRBC BLD AUTO-RTO: 0 /100
PH UR STRIP: 6 [PH] (ref 4.7–8)
PLATELET # BLD AUTO: 276 10E3/UL (ref 150–450)
POTASSIUM SERPL-SCNC: 3.7 MMOL/L (ref 3.4–5.3)
PROT SERPL-MCNC: 6.8 G/DL (ref 6.4–8.3)
RBC # BLD AUTO: 5.06 10E6/UL (ref 3.8–5.2)
RBC URINE: 4 /HPF
RSV RNA SPEC NAA+PROBE: POSITIVE
SARS-COV-2 RNA RESP QL NAA+PROBE: NEGATIVE
SODIUM SERPL-SCNC: 137 MMOL/L (ref 136–145)
SP GR UR STRIP: 1.01 (ref 1–1.03)
SQUAMOUS EPITHELIAL: 2 /HPF
UROBILINOGEN UR STRIP-MCNC: NORMAL MG/DL
WBC # BLD AUTO: 10.2 10E3/UL (ref 4–11)
WBC URINE: <1 /HPF

## 2022-11-01 PROCEDURE — 250N000011 HC RX IP 250 OP 636: Performed by: RADIOLOGY

## 2022-11-01 PROCEDURE — 85025 COMPLETE CBC W/AUTO DIFF WBC: CPT | Performed by: STUDENT IN AN ORGANIZED HEALTH CARE EDUCATION/TRAINING PROGRAM

## 2022-11-01 PROCEDURE — 74177 CT ABD & PELVIS W/CONTRAST: CPT

## 2022-11-01 PROCEDURE — 87637 SARSCOV2&INF A&B&RSV AMP PRB: CPT | Performed by: STUDENT IN AN ORGANIZED HEALTH CARE EDUCATION/TRAINING PROGRAM

## 2022-11-01 PROCEDURE — 83690 ASSAY OF LIPASE: CPT | Performed by: STUDENT IN AN ORGANIZED HEALTH CARE EDUCATION/TRAINING PROGRAM

## 2022-11-01 PROCEDURE — 36415 COLL VENOUS BLD VENIPUNCTURE: CPT | Performed by: STUDENT IN AN ORGANIZED HEALTH CARE EDUCATION/TRAINING PROGRAM

## 2022-11-01 PROCEDURE — C9803 HOPD COVID-19 SPEC COLLECT: HCPCS

## 2022-11-01 PROCEDURE — 250N000013 HC RX MED GY IP 250 OP 250 PS 637: Performed by: STUDENT IN AN ORGANIZED HEALTH CARE EDUCATION/TRAINING PROGRAM

## 2022-11-01 PROCEDURE — 99284 EMERGENCY DEPT VISIT MOD MDM: CPT | Performed by: STUDENT IN AN ORGANIZED HEALTH CARE EDUCATION/TRAINING PROGRAM

## 2022-11-01 PROCEDURE — 80053 COMPREHEN METABOLIC PANEL: CPT | Performed by: STUDENT IN AN ORGANIZED HEALTH CARE EDUCATION/TRAINING PROGRAM

## 2022-11-01 PROCEDURE — 81001 URINALYSIS AUTO W/SCOPE: CPT | Performed by: STUDENT IN AN ORGANIZED HEALTH CARE EDUCATION/TRAINING PROGRAM

## 2022-11-01 PROCEDURE — 258N000003 HC RX IP 258 OP 636: Performed by: STUDENT IN AN ORGANIZED HEALTH CARE EDUCATION/TRAINING PROGRAM

## 2022-11-01 PROCEDURE — 99285 EMERGENCY DEPT VISIT HI MDM: CPT | Mod: 25

## 2022-11-01 RX ORDER — ONDANSETRON 2 MG/ML
4 INJECTION INTRAMUSCULAR; INTRAVENOUS EVERY 30 MIN PRN
Status: DISCONTINUED | OUTPATIENT
Start: 2022-11-01 | End: 2022-11-01 | Stop reason: HOSPADM

## 2022-11-01 RX ORDER — KETOROLAC TROMETHAMINE 15 MG/ML
10 INJECTION, SOLUTION INTRAMUSCULAR; INTRAVENOUS
Status: DISCONTINUED | OUTPATIENT
Start: 2022-11-01 | End: 2022-11-01 | Stop reason: HOSPADM

## 2022-11-01 RX ORDER — SODIUM CHLORIDE 9 MG/ML
INJECTION, SOLUTION INTRAVENOUS CONTINUOUS
Status: DISCONTINUED | OUTPATIENT
Start: 2022-11-01 | End: 2022-11-01 | Stop reason: HOSPADM

## 2022-11-01 RX ORDER — IOPAMIDOL 755 MG/ML
72 INJECTION, SOLUTION INTRAVASCULAR ONCE
Status: COMPLETED | OUTPATIENT
Start: 2022-11-01 | End: 2022-11-01

## 2022-11-01 RX ORDER — ACETAMINOPHEN 325 MG/1
975 TABLET ORAL
Status: COMPLETED | OUTPATIENT
Start: 2022-11-01 | End: 2022-11-01

## 2022-11-01 RX ORDER — OXYCODONE HYDROCHLORIDE 5 MG/1
5 TABLET ORAL
Status: DISCONTINUED | OUTPATIENT
Start: 2022-11-01 | End: 2022-11-01 | Stop reason: HOSPADM

## 2022-11-01 RX ADMIN — Medication 975 MG: at 09:35

## 2022-11-01 RX ADMIN — IOPAMIDOL 72 ML: 755 INJECTION, SOLUTION INTRAVENOUS at 11:06

## 2022-11-01 RX ADMIN — SODIUM CHLORIDE 1000 ML: 9 INJECTION, SOLUTION INTRAVENOUS at 09:22

## 2022-11-01 ASSESSMENT — ACTIVITIES OF DAILY LIVING (ADL)
ADLS_ACUITY_SCORE: 35
ADLS_ACUITY_SCORE: 35

## 2022-11-01 NOTE — ED TRIAGE NOTES
Patient presents with complaints of abdominal pain. States hx of diverticulitis as well as diverticulosis and states she's been having abdominal pain since Saturday and wants to get it checked out. Has put herself on a clear liquid diet with minimal improvement

## 2022-11-01 NOTE — ED PROVIDER NOTES
History     Chief Complaint   Patient presents with     Abdominal Pain     HPI  Veronica Ansari is a 52 year old female with hx of diverticulitis and other medical issues as described below who presents to the emergency department today complaining of left lower quadrant abdominal pain for the last 3 days that feels like her previous bouts of diverticulitis.  She states that there is been no nausea and vomiting, abdominal pain is primarily in that left lower quadrant and that she put her self on a clear liquid diet but that has not really improved things.  She has no other complaints at this time.    Allergies:  Allergies   Allergen Reactions     Augmentin Diarrhea     C diff     Ciprofloxacin Unknown     Tingling in feet     Doxycycline Unknown     Flagyl [Metronidazole] Other (See Comments)     Food      [place] cause rash     Sulfamethoxazole W/Trimethoprim Rash       Problem List:    Patient Active Problem List    Diagnosis Date Noted     Abdominal pain 12/08/2021     Priority: Medium     Colon cancer screening 06/08/2021     Priority: Medium     Formatting of this note might be different from the original.  Added automatically from request for surgery [id number]       Prolapsed internal hemorrhoids 06/08/2021     Priority: Medium     Formatting of this note might be different from the original.  Added automatically from request for surgery [id number]    Formatting of this note might be different from the original.  Formatting of this note might be different from the original.  Added automatically from request for surgery [id number]  Formatting of this note might be different from the original.  Formatting of this note might be different from the original.  Added automatically from request for surgery [id number]       Diverticulitis 05/05/2021     Priority: Medium     Added automatically from request for surgery [id number]       Ulcer of esophagus 05/21/2020     Priority: Medium     Added automatically from request for  surgery [id number]    Formatting of this note might be different from the original.  Formatting of this note might be different from the original.  Added automatically from request for surgery [id number]  Formatting of this note might be different from the original.  Added automatically from request for surgery [id number]       Biliary dyskinesia 11/14/2018     Priority: Medium     Other specified diseases of gallbladder 11/14/2018     Priority: Medium     S/P abdominoplasty 04/02/2018     Priority: Medium     Overview:   complicated by right calf DVT       Other specified postprocedural states 04/02/2018     Priority: Medium     Formatting of this note might be different from the original.  Formatting of this note might be different from the original.  complicated by right calf DVT  Formatting of this note might be different from the original.  Overview:   complicated by right calf DVT       Irritable bowel syndrome with diarrhea 11/14/2017     Priority: Medium     Menorrhagia 04/19/2016     Priority: Medium     Pain of foot 04/02/2015     Priority: Medium     Last Assessment & Plan:   Formatting of this note might be different from the original.   Chronic arthritis. Receives injections as needed with PCP. 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  IMO Update       Arthralgia of hip 04/14/2011     Priority: Medium     H/O deep venous thrombosis 03/10/2007     Priority: Medium     Overview:   Post-operative LE DVT       Personal history of other venous thrombosis and embolism 03/10/2007     Priority: Medium     Formatting of this note might be different from the  original.  Formatting of this note might be different from the original.  Post-operative LE DVT  Formatting of this note might be different from the original.  Overview:   Post-operative LE DVT    Last Assessment & Plan:   Formatting of this note might be different from the original.  Post surgery was on anticoagulant afterwards. Off anticoagulants since        Fibrocystic breast changes 2006     Priority: Medium     Sinusitis, chronic 2000     Priority: Medium     Overview:   Chronic  IMO Update 10/11       Deviated nasal septum 2000     Priority: Medium        Past Medical History:    Past Medical History:   Diagnosis Date     Cervicalgia      Deviated nasal septum      Embolism and thrombosis (H)      Fibrocystic breast disease      Migraine      Prothrombin mutation (H)      Sinusitis        Past Surgical History:    Past Surgical History:   Procedure Laterality Date     ABDOMINOPLASTY        SECTION      x2     COLONOSCOPY - HIM SCAN  2011    Colonoscopy 2011     COLONOSCOPY - HIM SCAN  2018    Procedure:  Colonoscopy diagnostic with polypectomy and biopsies;  Surgeon:  Danelle Swift MD;  Location:  [facility] OR     COLONOSCOPY - HIM SCAN  2007    Colonoscopy, flex, diagnostic     DILATE CERVIX, HYSTEROSCOPY, ABLATE ENDOMETRIUM, COMBINED N/A 2016    Procedure: COMBINED DILATE CERVIX, HYSTEROSCOPY, ABLATE ENDOMETRIUM;  Surgeon: Pacheco Curry MD;  Location: [facility] OR     ESOPHAGOGASTRODUODENOSCOPY      negative     ESOPHAGOSCOPY, GASTROSCOPY, DUODENOSCOPY (EGD), COMBINED N/A 2020    Procedure: Upper Endoscopy with biopsy;  Surgeon: Sno Cooper MD;  Location: [facility] OR     ORTHOPEDIC SURGERY Left     rotator cuff repair and bicep tendon repair     ZZC BREAST AUGMENTATION       ZZC LAP CHOLECYSTOSOMY         Family History:    History reviewed. No pertinent family history.    Social History:  Marital Status:   [2]  Social History      Tobacco Use     Smoking status: Former     Years: 3.00     Types: Cigarettes     Quit date:      Years since quittin.8     Smokeless tobacco: Never   Substance Use Topics     Alcohol use: Yes     Comment: weekly     Drug use: No        Medications:    budesonide (PULMICORT) 0.5 MG/2ML neb solution  cholecalciferol (VITAMIN D3) 25 mcg (1000 units) capsule  fluticasone (FLONASE) 50 MCG/ACT nasal spray  omeprazole (PRILOSEC) 40 MG DR capsule          Review of Systems  A complete review of systems was performed and is otherwise negative.     Physical Exam   BP: 150/88  Pulse: 101  Temp: 99.6  F (37.6  C)  Resp: 16  SpO2: 98 %      Physical Exam  Constitutional: Alert and conversant. NAD   HENT: NCAT   Eyes: Normal pupils   Neck: supple   CV: Normal rate, regular rhythm, no murmur   Pulmonary/Chest: Non-labored respirations, clear to auscultation bilaterally   Abdominal: Soft, non-tender, non-distended, no rebound, no guarding  MSK: [place].   Neuro: Alert and appropriate   Skin: Warm and dry. No diaphoresis. No rashes on exposed skin    Psych: Appropriate mood and affect     ED Course              ED Course as of 22 1153   Tue 2022   0900 Differential includes but is not limited to appendicitis, cholecystitis, pancreatitis, gastroesophageal reflux disease, gastritis, nephrolithiasis, pyelonephritis, urinary tract infection, ovarian cyst, ovarian torsion, ectopic pregnancy, mesenteric ischemia, strangulated hernia, aortic aneurysm.      0933 Physical exam overall reassuring, but with LLQ pain and initial vitals, medically reasonable to proceed with CT. Discussed R&B with pt, pt wants to proceed with CT.    1025 Resp Syncytial Virus(!): Positive   1137 CT Abdomen Pelvis w Contrast  Perisigmoid changes suggestive of epiploic appendagitis versus mild sigmoid diverticulitis. No evidence of complication, such as abscess  or perforation.   1137 Case discussed with the patient, the findings unclear  between either diverticulitis or epiploic appendagitis.  Discussed current recommendations of not treating with antibiotics.  She has requested that we forego antibiotics and proceed with a wait-and-see approach.  I think this is not unreasonable.   1138   Patient discharged in stable condition all question answered return precautions given recommending surgery follow-up within t 2 days.  Discharged without antibiotics     Procedures              Results for orders placed or performed during the hospital encounter of 11/01/22 (from the past 24 hour(s))   CBC with platelets differential    Narrative    The following orders were created for panel order CBC with platelets differential.  Procedure                               Abnormality         Status                     ---------                               -----------         ------                     CBC with platelets and d...[[id number]]                      Final result                 Please view results for these tests on the individual orders.   CBC with platelets and differential   Result Value Ref [place]    WBC Count 10.2 4.0 - 11.0 10e3/uL    RBC Count 5.06 3.80 - 5.20 10e6/uL    Hemoglobin 15.0 11.7 - 15.7 g/dL    Hematocrit 44.4 35.0 - 47.0 %    MCV 88 78 - 100 fL    MCH 29.6 26.5 - 33.0 pg    MCHC 33.8 31.5 - 36.5 g/dL    RDW 12.2 10.0 - 15.0 %    Platelet Count 276 150 - 450 10e3/uL    % Neutrophils 74 %    % Lymphocytes 14 %    % Monocytes 10 %    % Eosinophils 1 %    % Basophils 0 %    % Immature Granulocytes 1 %    NRBCs per 100 WBC 0 <1 /100    Absolute Neutrophils 7.6 1.6 - 8.3 10e3/uL    Absolute Lymphocytes 1.4 0.8 - 5.3 10e3/uL    Absolute Monocytes 1.0 0.0 - 1.3 10e3/uL    Absolute Eosinophils 0.1 0.0 - 0.7 10e3/uL    Absolute Basophils 0.0 0.0 - 0.2 10e3/uL    Absolute Immature Granulocytes 0.1 <=0.4 10e3/uL    Absolute NRBCs 0.0 10e3/uL   Symptomatic; Unknown Influenza A/B & SARS-CoV2 (COVID-19) Virus PCR Multiplex Nose    Specimen: Nose;  Swab   Result Value Ref [place]    Influenza A PCR Negative Negative    Influenza B PCR Negative Negative    RSV PCR Positive (A) Negative    SARS CoV2 PCR Negative Negative    Narrative    Testing was performed using the Xpert Xpress CoV2/Flu/RSV Assay on the Cepheid GeneXpert Instrument. This test should be ordered for the detection of SARS-CoV-2 and influenza viruses in individuals who meet clinical and/or epidemiological criteria. Test performance is unknown in asymptomatic patients. This test is for in vitro diagnostic use under the FDA EUA for laboratories certified under CLIA to perform high or moderate complexity testing. This test has not been FDA cleared or approved. A negative result does not rule out the presence of PCR inhibitors in the specimen or target RNA in concentration below the limit of detection for the assay. If only one viral target is positive but coinfection with multiple targets is suspected, the sample should be re-tested with another FDA cleared, approved, or authorized test, if coinfection would change clinical management. This test was validated by the [facility]. These laboratories are certified under the Clinical Laboratory Improvement Amendments of 1988 (CLIA-88) as qualified to perform high complexity laboratory testing.   Comprehensive metabolic panel   Result Value Ref [place]    Sodium 137 136 - 145 mmol/L    Potassium 3.7 3.4 - 5.3 mmol/L    Chloride 102 98 - 107 mmol/L    Carbon Dioxide (CO2) 24 22 - 29 mmol/L    Anion Gap 11 7 - 15 mmol/L    Urea Nitrogen 8.2 6.0 - 20.0 mg/dL    Creatinine 0.68 0.51 - 0.95 mg/dL    Calcium 8.7 8.6 - 10.0 mg/dL    Glucose 98 70 - 99 mg/dL    Alkaline Phosphatase 77 35 - 104 U/L    AST 17 10 - 35 U/L    ALT 11 10 - 35 U/L    Protein Total 6.8 6.4 - 8.3 g/dL    Albumin 3.7 3.5 - 5.2 g/dL    Bilirubin Total 0.9 <=1.2 mg/dL    GFR Estimate >90 >60 mL/min/1.73m2   Lipase   Result Value Ref [place]    Lipase 24 13 - 60 U/L   Extra Tube     Narrative    The following orders were created for panel order Extra Tube.  Procedure                               Abnormality         Status                     ---------                               -----------         ------                     Extra Blue Top Tube[[id number]]                              Final result               Extra Red Top Tube[[id number]]                               Final result               Extra Purple Top Tube[[id number]]                            Final result               Extra Green Top (Lithium...[[id number]]                      Final result                 Please view results for these tests on the individual orders.   Extra Red Top Tube   Result Value Ref [place]    Hold Specimen JIC    Extra Purple Top Tube   Result Value Ref [place]    Hold Specimen JIC    Extra Green Top (Lithium Heparin) ON ICE   Result Value Ref [place]    Hold Specimen JIC    Extra Blue Top Tube   Result Value Ref [place]    Hold Specimen JIC    UA with Microscopic reflex to Culture    Specimen: Urine, NOS   Result Value Ref [place]    Color Urine Light Yellow Colorless, Straw, Light Yellow, Yellow    Appearance Urine Clear Clear    Glucose Urine Negative Negative mg/dL    Bilirubin Urine Negative Negative    Ketones Urine Trace (A) Negative mg/dL    Specific Gravity Urine 1.012 1.003 - 1.035    Blood Urine Small (A) Negative    pH Urine 6.0 4.7 - 8.0    Protein Albumin Urine Negative Negative mg/dL    Urobilinogen Urine Normal Normal, 2.0 mg/dL    Nitrite Urine Negative Negative    Leukocyte Esterase Urine Negative Negative    Mucus Urine Present (A) None Seen /LPF    RBC Urine 4 (H) <=2 /HPF    WBC Urine <1 <=5 /HPF    Squamous Epithelials Urine 2 (H) <=1 /HPF    Narrative    Urine Culture not indicated   CT Abdomen Pelvis w Contrast    Narrative    EXAM: CT ABDOMEN PELVIS W CONTRAST 11/1/2022 11:14 AM    HISTORY: LLQ pain, diverticulitis?    COMPARISON: MR pelvis 8/4/2021; CT abdomen pelvis  4/11/2021    TECHNIQUE:   Imaging protocol: Helical CT images of abdomen and pelvis with  reconstructions in 3 planes acquired after administration of  intravenous contrast. Meds given: ISOVUE 370  72mL.  Acquisition: This CT exam was performed using one or more the  following dose reduction techniques: automated exposure control,  adjustment of the mA and/or kV according to patient size, and/or  iterative reconstruction technique.    FINDINGS:    LOWER CHEST:  Bibasilar atelectasis. No suspicious pulmonary nodules or masses.    ABDOMEN/PELVIS:  LIVER: Normal contour. No suspicious liver lesions.  GALLBLADDER: Surgically absent.  BILE DUCTS: Mild biliary tract prominence, within normal limits status  post cholecystectomy.  PANCREAS: Within normal limits.  SPLEEN: No splenomegaly. Left upper quadrant splenule.  ADRENALS: Within normal limits.  KIDNEYS/URETERS: Too small to characterize hypoattenuating renal  parenchymal foci. No renal soft tissue masses, hydronephrosis,  nephrolithiasis.  URINARY BLADDER: Within normal limits.  REPRODUCTIVE ORGANS: No pelvic masses. No significant change in  appearance of nabothian cysts.    BOWEL: No bowel dilatation or wall thickening. Colonic diverticulosis.  Normal appendix.  PERITONEUM/RETROPERITONEUM: No free air or free fluid. Mild sigmoid  pericolonic stranding. Stranding is noted in the area of previous  diverticulitis seen on comparison 4/11/2021. On today's study,  however, there is stranding noted surrounding an ovoid fatty  pericolonic focus (series 2 image 182; series 4 image 62).  VESSELS: Minimal atherosclerotic calcification of the aorta and its  major branches without aneurysmal dilation. Prominent ovarian and  periovarian pelvic veins.  LYMPH NODES: There are no pathologically enlarged lymph nodes.    BONES AND SOFT TISSUES:  No suspicious osseous lesions. Degenerative periarticular changes and  spinal spondylosis.      Impression    IMPRESSION:  Perisigmoid  changes suggestive of epiploic appendagitis versus mild  sigmoid diverticulitis. No evidence of complication, such as abscess  or perforation.    JESSI ZELAYA MD         SYSTEM ID:  [id number]       Medications   0.9% sodium chloride BOLUS (1,000 mLs Intravenous New Bag 11/1/22 0922)     Followed by   sodium chloride 0.9% infusion (has no administration in time range)   ondansetron (ZOFRAN) injection 4 mg (has no administration in time range)   ketorolac (TORADOL) injection 10 mg (has no administration in time range)   oxyCODONE (ROXICODONE) tablet 5 mg (has no administration in time range)   acetaminophen (TYLENOL) tablet 975 mg (975 mg Oral Given 11/1/22 0935)   sodium chloride (PF) 0.9% PF flush 60 mL (60 mLs Intravenous Given 11/1/22 1106)   iopamidol (ISOVUE-370) solution 72 mL (72 mLs Intravenous Given 11/1/22 1106)       Assessments & Plan (with Medical Decision Making)     I have reviewed the nursing notes.    I have reviewed the findings, diagnosis, plan and need for follow up with the patient.      New Prescriptions    No medications on file       Final diagnoses:   Diverticulitis of colon       11/1/2022   [facility]ENC[facility]     Robert Pennington MD  11/01/22 1239

## 2022-11-01 NOTE — ED NOTES
Pt presents with c/o abd pain, fever, nasal congestion.  Pt states history of diverticulitis and recent exposure to confirmed RSV. Pt declining all medications besides tylenol at this point. Denies nausea at this time.

## 2022-11-01 NOTE — DISCHARGE INSTRUCTIONS
Return to the emergency department for worsening symptoms or new concerning symptoms you can use ibuprofen and Tylenol for pain control going forward.  Please follow-up with your surgeon or one of our surgeons within the next 2 days.  Call to schedule an appointment.  Have given you a referral to help get you in.  As we discussed we are proceeding with a watchful waiting approach and this is why it is important that you get seen within the next 48 to 72 hours.  If you cannot be seen by one of the surgeons it is not unreasonable to be seen by your primary care provider as a backup plan    What to expect when you have contrast    During your exam, we will inject  contrast  into your vein or artery. (Contrast is a clear liquid with iodine in it. It shows up on X-rays.)    You may feel warm or hot. You may have a metal taste in your mouth and a slight upset stomach. You may also feel pressure near the kidneys and bladder. These effects will last about 1 to 3 minutes.    Please tell us if you have:   Sneezing    Itching   Hives    Swelling in the face   A hoarse voice   Breathing problems   Other new symptoms    Serious problems are rare.  They may include:   Irregular heartbeat    Seizures   Kidney failure             Tissue damage   Shock     Death    If you have any problems during the exam, we  will treat them right away.    When you get home    Call your hospital if you have any new symptoms in the next 2 days, like hives or swelling. (Phone numbers are at the bottom of this page.) Or call your family doctor.     If you have wheezing or trouble breathing, call 911.    Self-care  -Drink at least 4 extra glasses of water today.   This reduces the stress on your kidneys.  -Keep taking your regular medicines.    The contrast will pass out of your body in your  Urine(pee). This will happen in the next 24 hours. You  will not feel this. Your urine will not  change color.    If you have kidney problems or take  metformin    Drink 4 to 8 large glasses of water for the next  2 days, if you are not on a fluid restriction.    ?If you take metformin (Glucophage or Glucovance) for diabetes, keep taking it.      ?Your kidney function tests are abnormal.  If you take Metformin, do not take it for 48 hours. Please go to your clinic for a blood test within 3 days after your exam before the restarting this medicine.     (Note to provider:please give patient prescription for lab tests.)    ?Special instructions: -    I have read and understand the above information.    Patient Sign Here:______________________________________Date:________Time:______    Staff Sign Here:________________________________________Date:_______Time:______      Radiology Departments:     ?[facility]: [phone] ?[place]: [phone]     ?[place]: [phone] ?[facility]:[phone]      ?[place]: [phone]  ?[facility]: [phone]  ?[place]:[phone]    ?[facility] [place]:[phone]  ?[facility]:[phone]

## 2022-11-03 ENCOUNTER — OFFICE VISIT (OUTPATIENT)
Dept: SURGERY | Facility: OTHER | Age: 52
End: 2022-11-03
Attending: STUDENT IN AN ORGANIZED HEALTH CARE EDUCATION/TRAINING PROGRAM
Payer: COMMERCIAL

## 2022-11-03 VITALS
SYSTOLIC BLOOD PRESSURE: 110 MMHG | HEIGHT: 64 IN | OXYGEN SATURATION: 98 % | WEIGHT: 159 LBS | TEMPERATURE: 98.8 F | BODY MASS INDEX: 27.14 KG/M2 | DIASTOLIC BLOOD PRESSURE: 71 MMHG | HEART RATE: 88 BPM

## 2022-11-03 DIAGNOSIS — K57.32 DIVERTICULITIS OF COLON: ICD-10-CM

## 2022-11-03 PROCEDURE — 99213 OFFICE O/P EST LOW 20 MIN: CPT | Performed by: SURGERY

## 2022-11-03 ASSESSMENT — PAIN SCALES - GENERAL: PAINLEVEL: NO PAIN (0)

## 2022-11-03 NOTE — NURSING NOTE
"Chief Complaint   Patient presents with     Consult     Diverticulitis of colon        Initial /71 (BP Location: Left arm, Cuff Size: Adult Regular)   Pulse 88   Temp 98.8  F (37.1  C) (Tympanic)   Ht 1.626 m (5' 4\")   Wt 72.1 kg (159 lb)   LMP 04/11/2016 (LMP Unknown)   SpO2 98%   BMI 27.29 kg/m   Estimated body mass index is 27.29 kg/m  as calculated from the following:    Height as of this encounter: 1.626 m (5' 4\").    Weight as of this encounter: 72.1 kg (159 lb).  Medication Reconciliation: complete  Jewels Overton LPN  "

## 2022-11-03 NOTE — PATIENT INSTRUCTIONS
Thank you for allowing Dr. Morillo and our surgical team to participate in your care. Please call our health unit coordinator at [phone] with scheduling questions or the nurse at [phone] with any other questions or concerns.

## 2022-11-07 NOTE — PROGRESS NOTES
[facility] Surgery Consultation    CC:  Abdominal pain     HPI:  This 52 year old year old female is seen at the request of Dr. Pennington for evaluation of possible diverticulitis. She notes pain in her left upper quadrant which prompted her to go into the the ED to be evaluated. She did have a bout of diverticulitis/colits in 2021. This most recent episode was not as bad as this and is really in no pain today. After her bout in  she had follow up within the [facility] system and appears has sought opinions of multiple colon and rectal surgeons. Surgery was not recommended at that point. She does follow with GI down at [facility] as well. She is feeling well today. No fevers, no blood in stool. Last colonoscopy was in  and was normal per report.     Past Medical History:   Diagnosis Date     Cervicalgia      Deviated nasal septum      Embolism and thrombosis (H)     unspecified site     Fibrocystic breast disease      Migraine      Prothrombin mutation (H)      Sinusitis        Past Surgical History:   Procedure Laterality Date     ABDOMINOPLASTY        SECTION      x2     COLONOSCOPY - HIM SCAN  2011    Colonoscopy 2011     COLONOSCOPY - HIM SCAN  2018    Procedure:  Colonoscopy diagnostic with polypectomy and biopsies;  Surgeon:  Danelle Swift MD;  Location:  [facility] OR     COLONOSCOPY - HIM SCAN  2007    Colonoscopy, flex, diagnostic     DILATE CERVIX, HYSTEROSCOPY, ABLATE ENDOMETRIUM, COMBINED N/A 2016    Procedure: COMBINED DILATE CERVIX, HYSTEROSCOPY, ABLATE ENDOMETRIUM;  Surgeon: Pacheco Curry MD;  Location: [facility] OR     ESOPHAGOGASTRODUODENOSCOPY      negative     ESOPHAGOSCOPY, GASTROSCOPY, DUODENOSCOPY (EGD), COMBINED N/A 2020    Procedure: Upper Endoscopy with biopsy;  Surgeon: Son Cooper MD;  Location: [facility] OR     ORTHOPEDIC SURGERY Left     rotator cuff repair and bicep tendon repair     ZZC BREAST AUGMENTATION       ZZC LAP CHOLECYSTOSOMY         Allergies  "  Allergen Reactions     Augmentin Diarrhea     C diff     Ciprofloxacin Unknown     Tingling in feet     Doxycycline Unknown     Flagyl [Metronidazole] Other (See Comments)     Food      Mangoes cause rash     Sulfamethoxazole W/Trimethoprim Rash       Current Outpatient Medications   Medication     cholecalciferol (VITAMIN D3) 25 mcg (1000 units) capsule     fluticasone (FLONASE) 50 MCG/ACT nasal spray     omeprazole (PRILOSEC) 40 MG DR capsule     budesonide (PULMICORT) 0.5 MG/2ML neb solution     No current facility-administered medications for this visit.       HABITS:    Social History     Tobacco Use     Smoking status: Former     Years: 3.00     Types: Cigarettes     Quit date:      Years since quittin.8     Smokeless tobacco: Never   Substance Use Topics     Alcohol use: Yes     Comment: weekly     Drug use: No       No family history on file.    REVIEW OF SYSTEMS:  Ten point review of systems negative except those mentioned in the HPI.     OBJECTIVE:    /71 (BP Location: Left arm, Cuff Size: Adult Regular)   Pulse 88   Temp 98.8  F (37.1  C) (Tympanic)   Ht 1.626 m (5' 4\")   Wt 72.1 kg (159 lb)   LMP 2016 (LMP Unknown)   SpO2 98%   BMI 27.29 kg/m      GENERAL: Generally appears well, in no distress with appropriate affect.  HEENT:   Sclerae anicteric - normocephalic atraumatic   Respiratory:  No acute distress, no splinting   Cardiovascular:  Regular Rate and Rhythm  Abdomen: non-tender, non-distended, no tenderness in LLQ.   :  deferred  Extremities:  Extremities normal. No deformities, edema, or skin discoloration.  Skin:  no suspicious lesions or rashes  Neurological: grossly intact  Psych:  Alert, oriented, affect appropriate with normal decision making ability.    IMPRESSION:    Did review her CT scans from both 2021 and this most recent with patient. There were just very mild inflammatory changes noted on most recent, definitely not as significant as prior attack. Her " symptoms do not seem to be in location I would expect. She did have a colonoscopy within the last few years and would not recommend repeating colonoscopy for this possible bout of uncomplicated diverticulitis. I would refer her back to her GI doctors and did discuss risk mitigation with lower meat diet, regular exercise, fiber supplements and not smoking.     PLAN:    No plan for repeat colonoscopy or sigmoid colectomy.    Follow up with GI doctors     Matthias Morillo MD,     11/7/2022  10:49 AM

## 2022-12-05 ENCOUNTER — OFFICE VISIT (OUTPATIENT)
Dept: PODIATRY | Facility: OTHER | Age: 52
End: 2022-12-05
Attending: PODIATRIST
Payer: COMMERCIAL

## 2022-12-05 VITALS
TEMPERATURE: 96.9 F | OXYGEN SATURATION: 95 % | DIASTOLIC BLOOD PRESSURE: 87 MMHG | HEART RATE: 73 BPM | SYSTOLIC BLOOD PRESSURE: 125 MMHG

## 2022-12-05 DIAGNOSIS — L84 CALLUS OF FOOT: ICD-10-CM

## 2022-12-05 DIAGNOSIS — M62.461 GASTROCNEMIUS EQUINUS OF RIGHT LOWER EXTREMITY: ICD-10-CM

## 2022-12-05 DIAGNOSIS — M79.672 LEFT FOOT PAIN: ICD-10-CM

## 2022-12-05 DIAGNOSIS — M62.462 GASTROCNEMIUS EQUINUS OF LEFT LOWER EXTREMITY: ICD-10-CM

## 2022-12-05 DIAGNOSIS — M20.22 HALLUX RIGIDUS OF LEFT FOOT: Primary | ICD-10-CM

## 2022-12-05 DIAGNOSIS — M21.42 PES PLANUS OF BOTH FEET: ICD-10-CM

## 2022-12-05 DIAGNOSIS — M20.5X2 ACQUIRED MALLET TOE OF LEFT FOOT: ICD-10-CM

## 2022-12-05 DIAGNOSIS — M21.41 PES PLANUS OF BOTH FEET: ICD-10-CM

## 2022-12-05 PROCEDURE — 20600 DRAIN/INJ JOINT/BURSA W/O US: CPT | Mod: LT | Performed by: PODIATRIST

## 2022-12-05 RX ORDER — TRIAMCINOLONE ACETONIDE 40 MG/ML
20 INJECTION, SUSPENSION INTRA-ARTICULAR; INTRAMUSCULAR ONCE
Status: COMPLETED | OUTPATIENT
Start: 2022-12-05 | End: 2022-12-05

## 2022-12-05 RX ADMIN — TRIAMCINOLONE ACETONIDE 20 MG: 40 INJECTION, SUSPENSION INTRA-ARTICULAR; INTRAMUSCULAR at 08:54

## 2022-12-05 ASSESSMENT — PAIN SCALES - GENERAL: PAINLEVEL: MILD PAIN (2)

## 2022-12-05 NOTE — PATIENT INSTRUCTIONS
Thank you for allowing  and our Podiatry team to participate in your care. Please call our office at [phone] with scheduling questions or with any other questions or concerns.

## 2022-12-05 NOTE — PROGRESS NOTES
Chief complaint: Patient presents with:  Musculoskeletal Problem: Left foot pain      History of Present Illness: This 52 year old female is seen for pain of the LEFT 1st MTPJ. She had a LEFT 1st MTPJ steroid injection on 05/06/2022.    The pain is in the 1st MTPJ and is along the top of the joint space. She has had minimal pain up until around the end of November, 2022. The injection helped a lot with her pain and she is looking for another injection today.    Patient said she went to [facility] and tried an orthotic with a hernandez's extension in the spring of 2022, but it made her pain worse. He made several modifications to the orthotic, but nothing reduced her pain so she did not purchase the orthotic s.    She said she was told previously by another provider that her great toe joint would need to be fused if she considered surgery. She is trying to avoid surgery and she has had successful pain relief with steroid injections.    No further pedal complaints today.     Patient does not use tobacco products.         /87 (BP Location: Left arm, Patient Position: Sitting, Cuff Size: Adult Regular)   Pulse 73   Temp 96.9  F (36.1  C) (Tympanic)   LMP 04/11/2016 (LMP Unknown)   SpO2 95%     Patient Active Problem List   Diagnosis     Menorrhagia     Sinusitis, chronic     Prothrombin [id number] mutation (H)     S/P abdominoplasty     Irritable bowel syndrome with diarrhea     Iron deficiency     H/O deep venous thrombosis     Cervicalgia     Deviated nasal septum     Fibrocystic breast changes     Ulcer of esophagus     Biliary dyskinesia     Diverticulitis     Colon cancer screening     Prolapsed internal hemorrhoids     Migraine     Other specified postprocedural states     Pain of foot     Arthralgia of hip     Personal history of other venous thrombosis and embolism     Shoulder pain     Other specified diseases of gallbladder     Abdominal pain       Past Surgical History:   Procedure Laterality Date      ABDOMINOPLASTY        SECTION      x2     COLONOSCOPY - HIM SCAN  2011    Colonoscopy 2011     COLONOSCOPY - HIM SCAN  2018    Procedure:  Colonoscopy diagnostic with polypectomy and biopsies;  Surgeon:  Danelle Swift MD;  Location:  [facility] OR     COLONOSCOPY - HIM SCAN  2007    Colonoscopy, flex, diagnostic     DILATE CERVIX, HYSTEROSCOPY, ABLATE ENDOMETRIUM, COMBINED N/A 2016    Procedure: COMBINED DILATE CERVIX, HYSTEROSCOPY, ABLATE ENDOMETRIUM;  Surgeon: Pacheco Curry MD;  Location: [facility] OR     ESOPHAGOGASTRODUODENOSCOPY      negative     ESOPHAGOSCOPY, GASTROSCOPY, DUODENOSCOPY (EGD), COMBINED N/A 2020    Procedure: Upper Endoscopy with biopsy;  Surgeon: Son Cooper MD;  Location: [facility] OR     ORTHOPEDIC SURGERY Left     rotator cuff repair and bicep tendon repair     ZZC BREAST AUGMENTATION       ZZC LAP CHOLECYSTOSOMY         Current Outpatient Medications   Medication     budesonide (PULMICORT) 0.5 MG/2ML neb solution     cholecalciferol (VITAMIN D3) 25 mcg (1000 units) capsule     fluticasone (FLONASE) 50 MCG/ACT nasal spray     omeprazole (PRILOSEC) 40 MG DR capsule     No current facility-administered medications for this visit.          Allergies   Allergen Reactions     Augmentin Diarrhea     C diff     Ciprofloxacin Unknown     Tingling in feet     Doxycycline Unknown     Flagyl [Metronidazole] Other (See Comments)     Food      Mangoes cause rash     Sulfamethoxazole W/Trimethoprim Rash       No family history on file.    Social History     Socioeconomic History     Marital status:      Spouse name: None     Number of children: None     Years of education: None     Highest education level: None   Occupational History     None   Tobacco Use     Smoking status: Former Smoker     Years: 3.00     Types: Cigarettes     Quit date:      Years since quittin.5     Smokeless tobacco: Never Used   Substance and Sexual Activity     Alcohol use: Yes      Alcohol/week: 0.0 standard drinks     Comment: Occassional     Drug use: No     Sexual activity: None   Other Topics Concern     Parent/sibling w/ CABG, MI or angioplasty before 65F 55M? Not Asked   Social History Narrative     None     Social Determinants of Health     Financial Resource Strain:      Difficulty of Paying Living Expenses:    Food Insecurity:      Worried About Running Out of Food in the Last Year:      Ran Out of Food in the Last Year:    Transportation Needs:      Lack of Transportation (Medical):      Lack of Transportation (Non-Medical):    Physical Activity:      Days of Exercise per Week:      Minutes of Exercise per Session:    Stress:      Feeling of Stress :    Social Connections:      Frequency of Communication with Friends and Family:      Frequency of Social Gatherings with Friends and Family:      Attends Yarsani Services:      Active Member of Clubs or Organizations:      Attends Club or Organization Meetings:      Marital Status:    Intimate Partner Violence:      Fear of Current or Ex-Partner:      Emotionally Abused:      Physically Abused:      Sexually Abused:        ROS: 10 point ROS neg other than the symptoms noted above in the HPI.  EXAM  Constitutional: healthy, alert and no distress    Psychiatric: mentation appears normal and affect normal/bright    VASCULAR:  -Dorsalis pedis pulse +2/4 b/l  -Posterior tibial pulse +2/4 b/l  -Capillary refill time < 3 seconds to b/l hallux  NEURO:  -Light touch sensation intact to b/l plantar forefoot  DERM:  -Skin temperature, texture and turgor WNL b/l  -Mild hyperkeratotic lesion to the distal plantar tip of the LEFT 2nd digit  MSK:  -Pain on palpation to LEFT dorsal lateral and dorsal medial 1st MTPJ (lateral more than medial)  -Pain with end ROM with DORSIFLEXION and PLANTARFLEXION of the 1st MTPJ of the LEFT fot    -LEFT 1st MTPJ has mild bony prominence on the dorsal and medial aspect of the 1st metatarsal head  ---ROM limited  to < 20 degrees DORSIFLEXION with loading the forefot    -Mild PLANTARFLEXION of the DIPJ of the LEFT foot DIPJ    -Muscle strength of ankles +5/5 for dorsiflexion, plantarflexion, ABDUction and ADDuction b/l    -Ankle joint passive ROM within normal limits except for dorsiflexion:    Dorsiflexion, RIGHT Straight knee 0 degrees    Dorsiflexion, LEFT Straight knee 0 degrees    -Moderate decrease in arch height while patient is NWB    RIGHT FOOT RADIOGRAPH 07/08/2021  IMPRESSION: 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  ESVIN SOTO MD   ============================================================    ASSESSMENT:  (M20.22) Hallux rigidus of left foot  (primary encounter diagnosis)    (M79.671) Right foot pain    (L84) Callus of foot    (M21.6X1) Gastrocnemius equinus of right lower extremity    (M21.6X2) Gastrocnemius equinus of left lower extremity    (M21.41,  M21.42) Pes planus of both feet    (M20.5X2) Acquired mallet toe of left foot      PLAN:  -Patient evaluated and examined. Treatment options discussed with no educational barriers noted.    Previous injections in the LEFT 1st MTPJ:  01/17/2022 October, 2021 by OA  July, 2021 05/06/2022    -Discussed hallux rigidus and hallux valgus procedures. If the patient stops having pain relief from injections and the great toe joint bothers her on a daily basis, that is when it is advised to consider surgical options. The surgical procedure is dependent on her x-ray and her long term goals. Discussed risks and benefits of a 1st TMTJ fusion, 1st MTPJ fusion, an osteotomy of the 1st metatarsal and a Cheilectomy. At this time, the injections are helping her pain so she will continue with injections as needed. If her pain worsens within the next two months because of her amount of gardening, then a Ketorolac injection in the joint may be considered.    -Steroid Injection x 1 into the first metatarsophalangeal joint of the LEFT foot: Obtained written  and verbal consent from patient for a steroid injection into the  1s MTPJ of the Left foot. Reviewed risks and benefits of the injection. Informed patient that the injection may decrease pain long-term, short-term, or not at all. Patient in agreement with an injection today in an effort to slow or reverse the chronic inflammatory process and pain in the foot. Steroid injections can thin cartilage and cause weakening of the structures in and around the joint space. This is a greater risk with more than three injections per year. Her last injection was seven months ago. She is trying to avoid surgery. She understands and wishes to proceed with an injection since it has been reducing her pain for many months at a time.  ---Patient signed informed consent and a time-out was performed and there was verification of correct patient, procedure and laterality.  ---Prepped the injection site with an alcohol swab.  ---Injected a total of 0.5 mL (20mg) Kenalog and 0.5mL of 2% Lidocaine plain into the dorsal lateral and dorsal medial 1st MTPJ. Patient tolerated the injection well.      -Orthotics: Patient tried Benders and tried an orthotic with a Mcknight's extension, but it made her pain worse.    -Patient in agreement with the above treatment plan and all of patient's questions were answered.      RTC as needed per patient request        Nya Bui, JOSIAS, JOSIAS

## 2022-12-14 DIAGNOSIS — Z12.31 VISIT FOR SCREENING MAMMOGRAM: Primary | ICD-10-CM

## 2022-12-21 ENCOUNTER — MYC MEDICAL ADVICE (OUTPATIENT)
Dept: FAMILY MEDICINE | Facility: OTHER | Age: 52
End: 2022-12-21

## 2022-12-29 ENCOUNTER — TELEPHONE (OUTPATIENT)
Dept: FAMILY MEDICINE | Facility: OTHER | Age: 52
End: 2022-12-29

## 2022-12-29 NOTE — TELEPHONE ENCOUNTER
To: Nurse to Dr. Ruiz    Patient has mole on back and does not want to wait until February (next available appt slot) any way she could be seen sooner?  Please return her call @ [phone].  Thank you

## 2022-12-29 NOTE — TELEPHONE ENCOUNTER
Lvm for pt to call back to schedule a mole check; per dr carrion can be scheduled on a same day spot during week of 1/16 (on a day where there is two or more same day open)

## 2023-01-12 NOTE — PROGRESS NOTES
"  Assessment & Plan     Change in mole  Enlarging; border changes noted;  Has dermatology consult scheduled 3/2023 in [place] - will keep that appointment; reassess sooner if ongoing changes or develops symptoms.  - Adult Dermatology Referral; Future    Concern about appearance of breast  Exam benign today.  Mammogram as scheduled 2/3/23.    Vaginal dryness  Mentioned at close of visit.  Vaginal exam was not performed, but is up to date on pap/physical just a couple months ago.  Will try topical estrogen only.  - conjugated estrogens (PREMARIN) 0.625 MG/GM vaginal cream; Place 0.5 g vaginally twice a week       BMI:   Estimated body mass index is 27.45 kg/m  as calculated from the following:    Height as of this encounter: 1.626 m (5' 4\").    Weight as of this encounter: 72.5 kg (159 lb 14.4 oz).   Weight management plan: Discussed healthy diet and exercise guidelines    Patient Instructions   Referral to dermatology.  Keep mammogram as scheduled.  Trial of vaginal estrogen for vaginal dryness, discomfort.  Reassess if not improving.      No follow-ups on file.    Jenelle Rivera MD  [facility] - JOHANA Martin is a 52 year old, presenting for the following health issues:  mole on back    HPI Concern - Mole on back  Onset: notice it around 2 months ago - or longer  Description: mole along bra line  Intensity: mild  Progression of Symptoms:  same  Accompanying Signs & Symptoms: wants mole looked at, its long bra line;she states has different coloration to it. Ring around it.  Previous history of similar problem: none  Precipitating factors:        Worsened by: none  Alleviating factors:        Improved by: none  Therapies tried and outcome: None  No pain, itching or bleeding.  Prior mole excisions - all benign.  Family history - negative for skin cancer.  No prior sunburns.  Using sun protection - at times.  No fever.    Scheduled for 3/2023 - [place].    Vaginal dryness - pain with " "intercourse off and on; no discharge; no skin changes; no concern for infection.  Has tried lubricants.    Also, nipples look more red; sometimes just one side, sometimes both.  No discharge, skin changes, masses or pain or itching.  Mammogram scheduled 2/3/23.    Review of Systems   Constitutional, HEENT, cardiovascular, pulmonary, gi and gu systems are negative, except as otherwise noted.      Objective    /86 (BP Location: Left arm, Patient Position: Sitting, Cuff Size: Adult Regular)   Pulse 77   Temp 98.7  F (37.1  C) (Tympanic)   Ht 1.626 m (5' 4\")   Wt 72.5 kg (159 lb 14.4 oz)   LMP 04/11/2016 (LMP Unknown)   SpO2 99%   BMI 27.45 kg/m    Body mass index is 27.45 kg/m .  Physical Exam   GENERAL: healthy, alert and no distress  EYES: Eyes grossly normal to inspection, PERRL and conjunctivae and sclerae normal  HENT: ear canals and TM's normal, nose and mouth without ulcers or lesions  NECK: no adenopathy, no asymmetry, masses, or scars and thyroid normal to palpation  RESP: lungs clear to auscultation - no rales, rhonchi or wheezes  BREAST: normal without masses, tenderness or nipple discharge and no palpable axillary masses or adenopathy  CV: regular rate and rhythm, normal S1 S2, no S3 or S4, no murmur, click or rub, no peripheral edema and peripheral pulses strong  ABDOMEN: soft, nontender, no hepatosplenomegaly, no masses and bowel sounds normal  MS: no gross musculoskeletal defects noted, no edema  SKIN: various small moles on back; 1 left side, with darker center, more irregular, less well defined border; see photos below  PSYCH: mentation appears normal, affect normal/bright                          "

## 2023-01-16 ENCOUNTER — OFFICE VISIT (OUTPATIENT)
Dept: FAMILY MEDICINE | Facility: OTHER | Age: 53
End: 2023-01-16
Attending: FAMILY MEDICINE
Payer: COMMERCIAL

## 2023-01-16 VITALS
OXYGEN SATURATION: 99 % | HEART RATE: 77 BPM | WEIGHT: 159.9 LBS | HEIGHT: 64 IN | SYSTOLIC BLOOD PRESSURE: 124 MMHG | DIASTOLIC BLOOD PRESSURE: 86 MMHG | TEMPERATURE: 98.7 F | BODY MASS INDEX: 27.3 KG/M2

## 2023-01-16 DIAGNOSIS — R46.89 CONCERN ABOUT APPEARANCE OF BREAST: ICD-10-CM

## 2023-01-16 DIAGNOSIS — N89.8 VAGINAL DRYNESS: ICD-10-CM

## 2023-01-16 DIAGNOSIS — D22.9 CHANGE IN MOLE: Primary | ICD-10-CM

## 2023-01-16 PROCEDURE — 90471 IMMUNIZATION ADMIN: CPT | Performed by: FAMILY MEDICINE

## 2023-01-16 PROCEDURE — 90746 HEPB VACCINE 3 DOSE ADULT IM: CPT | Performed by: FAMILY MEDICINE

## 2023-01-16 PROCEDURE — 90472 IMMUNIZATION ADMIN EACH ADD: CPT | Performed by: FAMILY MEDICINE

## 2023-01-16 PROCEDURE — 99213 OFFICE O/P EST LOW 20 MIN: CPT | Mod: 25 | Performed by: FAMILY MEDICINE

## 2023-01-16 PROCEDURE — 90682 RIV4 VACC RECOMBINANT DNA IM: CPT | Performed by: FAMILY MEDICINE

## 2023-01-16 RX ORDER — BIOTIN 1 MG
1000 TABLET ORAL DAILY
COMMUNITY

## 2023-01-16 ASSESSMENT — PAIN SCALES - GENERAL: PAINLEVEL: NO PAIN (0)

## 2023-01-16 NOTE — PATIENT INSTRUCTIONS
Referral to dermatology.  Keep mammogram as scheduled.  Trial of vaginal estrogen for vaginal dryness, discomfort.  Reassess if not improving.

## 2023-01-31 ENCOUNTER — APPOINTMENT (OUTPATIENT)
Dept: CT IMAGING | Facility: HOSPITAL | Age: 53
End: 2023-01-31
Attending: PHYSICIAN ASSISTANT
Payer: COMMERCIAL

## 2023-01-31 ENCOUNTER — HOSPITAL ENCOUNTER (EMERGENCY)
Facility: HOSPITAL | Age: 53
Discharge: HOME | End: 2023-01-31
Attending: PHYSICIAN ASSISTANT | Admitting: PHYSICIAN ASSISTANT
Payer: COMMERCIAL

## 2023-01-31 VITALS
SYSTOLIC BLOOD PRESSURE: 118 MMHG | DIASTOLIC BLOOD PRESSURE: 50 MMHG | RESPIRATION RATE: 16 BRPM | OXYGEN SATURATION: 100 % | TEMPERATURE: 98 F | HEART RATE: 68 BPM

## 2023-01-31 DIAGNOSIS — R10.9 ABDOMINAL PAIN OF UNKNOWN ETIOLOGY: ICD-10-CM

## 2023-01-31 LAB
ALBUMIN SERPL BCG-MCNC: 4 G/DL (ref 3.5–5.2)
ALBUMIN UR-MCNC: NEGATIVE MG/DL
ALP SERPL-CCNC: 65 U/L (ref 35–104)
ALT SERPL W P-5'-P-CCNC: 12 U/L (ref 10–35)
ANION GAP SERPL CALCULATED.3IONS-SCNC: 12 MMOL/L (ref 7–15)
APPEARANCE UR: CLEAR
AST SERPL W P-5'-P-CCNC: 15 U/L (ref 10–35)
BACTERIA #/AREA URNS HPF: ABNORMAL /HPF
BASOPHILS # BLD AUTO: 0.1 10E3/UL (ref 0–0.2)
BASOPHILS NFR BLD AUTO: 1 %
BILIRUB DIRECT SERPL-MCNC: <0.2 MG/DL (ref 0–0.3)
BILIRUB SERPL-MCNC: 0.8 MG/DL
BILIRUB UR QL STRIP: NEGATIVE
BUN SERPL-MCNC: 7.3 MG/DL (ref 6–20)
CALCIUM SERPL-MCNC: 9 MG/DL (ref 8.6–10)
CHLORIDE SERPL-SCNC: 102 MMOL/L (ref 98–107)
COLOR UR AUTO: YELLOW
CREAT SERPL-MCNC: 0.64 MG/DL (ref 0.51–0.95)
DEPRECATED HCO3 PLAS-SCNC: 26 MMOL/L (ref 22–29)
EOSINOPHIL # BLD AUTO: 0 10E3/UL (ref 0–0.7)
EOSINOPHIL NFR BLD AUTO: 0 %
ERYTHROCYTE [DISTWIDTH] IN BLOOD BY AUTOMATED COUNT: 12.9 % (ref 10–15)
GFR SERPL CREATININE-BSD FRML MDRD: >90 ML/MIN/1.73M2
GLUCOSE SERPL-MCNC: 83 MG/DL (ref 70–99)
GLUCOSE UR STRIP-MCNC: NEGATIVE MG/DL
HCT VFR BLD AUTO: 43.4 % (ref 35–47)
HGB BLD-MCNC: 14.6 G/DL (ref 11.7–15.7)
HGB UR QL STRIP: NEGATIVE
HOLD SPECIMEN: NORMAL
IMM GRANULOCYTES # BLD: 0.1 10E3/UL
IMM GRANULOCYTES NFR BLD: 1 %
KETONES UR STRIP-MCNC: 40 MG/DL
LEUKOCYTE ESTERASE UR QL STRIP: NEGATIVE
LYMPHOCYTES # BLD AUTO: 2.3 10E3/UL (ref 0.8–5.3)
LYMPHOCYTES NFR BLD AUTO: 23 %
MCH RBC QN AUTO: 29.9 PG (ref 26.5–33)
MCHC RBC AUTO-ENTMCNC: 33.6 G/DL (ref 31.5–36.5)
MCV RBC AUTO: 89 FL (ref 78–100)
MONOCYTES # BLD AUTO: 0.8 10E3/UL (ref 0–1.3)
MONOCYTES NFR BLD AUTO: 8 %
MUCOUS THREADS #/AREA URNS LPF: PRESENT /LPF
NEUTROPHILS # BLD AUTO: 6.7 10E3/UL (ref 1.6–8.3)
NEUTROPHILS NFR BLD AUTO: 67 %
NITRATE UR QL: NEGATIVE
NRBC # BLD AUTO: 0 10E3/UL
NRBC BLD AUTO-RTO: 0 /100
PH UR STRIP: 5 [PH] (ref 4.7–8)
PLATELET # BLD AUTO: 361 10E3/UL (ref 150–450)
POTASSIUM SERPL-SCNC: 3.7 MMOL/L (ref 3.4–5.3)
PROT SERPL-MCNC: 6.6 G/DL (ref 6.4–8.3)
RBC # BLD AUTO: 4.89 10E6/UL (ref 3.8–5.2)
RBC URINE: 1 /HPF
SODIUM SERPL-SCNC: 140 MMOL/L (ref 136–145)
SP GR UR STRIP: 1.02 (ref 1–1.03)
SQUAMOUS EPITHELIAL: 0 /HPF
UROBILINOGEN UR STRIP-MCNC: NORMAL MG/DL
WBC # BLD AUTO: 9.9 10E3/UL (ref 4–11)
WBC URINE: 1 /HPF

## 2023-01-31 PROCEDURE — 36415 COLL VENOUS BLD VENIPUNCTURE: CPT | Performed by: EMERGENCY MEDICINE

## 2023-01-31 PROCEDURE — 80053 COMPREHEN METABOLIC PANEL: CPT | Performed by: PHYSICIAN ASSISTANT

## 2023-01-31 PROCEDURE — 250N000011 HC RX IP 250 OP 636: Performed by: PHYSICIAN ASSISTANT

## 2023-01-31 PROCEDURE — 74177 CT ABD & PELVIS W/CONTRAST: CPT

## 2023-01-31 PROCEDURE — 81001 URINALYSIS AUTO W/SCOPE: CPT | Performed by: PHYSICIAN ASSISTANT

## 2023-01-31 PROCEDURE — 99285 EMERGENCY DEPT VISIT HI MDM: CPT | Mod: 25

## 2023-01-31 PROCEDURE — 99284 EMERGENCY DEPT VISIT MOD MDM: CPT | Performed by: PHYSICIAN ASSISTANT

## 2023-01-31 PROCEDURE — 82248 BILIRUBIN DIRECT: CPT | Performed by: PHYSICIAN ASSISTANT

## 2023-01-31 PROCEDURE — 85025 COMPLETE CBC W/AUTO DIFF WBC: CPT | Performed by: PHYSICIAN ASSISTANT

## 2023-01-31 RX ORDER — IOPAMIDOL 755 MG/ML
75 INJECTION, SOLUTION INTRAVASCULAR ONCE
Status: COMPLETED | OUTPATIENT
Start: 2023-01-31 | End: 2023-01-31

## 2023-01-31 RX ADMIN — IOPAMIDOL 75 ML: 755 INJECTION, SOLUTION INTRAVENOUS at 18:02

## 2023-01-31 ASSESSMENT — ENCOUNTER SYMPTOMS
NAUSEA: 0
CONSTIPATION: 0
BLOOD IN STOOL: 0
DIARRHEA: 1
FEVER: 0
VOMITING: 0
APPETITE CHANGE: 1
ABDOMINAL PAIN: 1
DIFFICULTY URINATING: 0

## 2023-01-31 ASSESSMENT — ACTIVITIES OF DAILY LIVING (ADL)
ADLS_ACUITY_SCORE: 35
ADLS_ACUITY_SCORE: 35

## 2023-01-31 NOTE — ED PROVIDER NOTES
History     Chief Complaint   Patient presents with     Abdominal Pain     Veronica Ansari is a 52 year old female patient that presents to the emergency department today from home. Patient reports abdominal bloating for one week and intermittent lower abdominal pains that she describes as sharp. Pain worsened with bending forward. She does not have that pain now. She hasn't eaten in two days due to a decreased appetite. She has been having diarrhea, last BM this afternoon. She called the [facility] who advised her to take a stool softener for constipation, which just made her have more loose stools. She reports a history of diverticulitis, has had her gall bladder removed, and a tummy tuck. Denies nausea, vomiting, fevers.    The history is provided by the patient.         Allergies:  Allergies   Allergen Reactions     Augmentin Diarrhea     C diff     Ciprofloxacin Unknown     Tingling in feet     Doxycycline Unknown     Flagyl [Metronidazole] Other (See Comments)     Food      Mangoes cause rash     Sulfamethoxazole W/Trimethoprim Rash       Problem List:    Patient Active Problem List    Diagnosis Date Noted     Abdominal pain 12/08/2021     Priority: Medium     Colon cancer screening 06/08/2021     Priority: Medium     Formatting of this note might be different from the original.  Added automatically from request for surgery [id number]       Prolapsed internal hemorrhoids 06/08/2021     Priority: Medium     Formatting of this note might be different from the original.  Added automatically from request for surgery [id number]    Formatting of this note might be different from the original.  Formatting of this note might be different from the original.  Added automatically from request for surgery [id number]  Formatting of this note might be different from the original.  Formatting of this note might be different from the original.  Added automatically from request for surgery [id number]       Diverticulitis 05/05/2021      Priority: Medium     Added automatically from request for surgery [id number]       Ulcer of esophagus 05/21/2020     Priority: Medium     Added automatically from request for surgery [id number]    Formatting of this note might be different from the original.  Formatting of this note might be different from the original.  Added automatically from request for surgery [id number]  Formatting of this note might be different from the original.  Added automatically from request for surgery [id number]       Biliary dyskinesia 11/14/2018     Priority: Medium     Other specified diseases of gallbladder 11/14/2018     Priority: Medium     S/P abdominoplasty 04/02/2018     Priority: Medium     Overview:   complicated by right calf DVT       Other specified postprocedural states 04/02/2018     Priority: Medium     Formatting of this note might be different from the original.  Formatting of this note might be different from the original.  complicated by right calf DVT  Formatting of this note might be different from the original.  Overview:   complicated by right calf DVT       Irritable bowel syndrome with diarrhea 11/14/2017     Priority: Medium     Menorrhagia 04/19/2016     Priority: Medium     Pain of foot 04/02/2015     Priority: Medium     Last Assessment & Plan:   Formatting of this note might be different from the original.   Chronic arthritis. Receives injections as needed with PCP. 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  IMO Update       Arthralgia of hip 04/14/2011     Priority: Medium     H/O deep venous thrombosis 03/10/2007     Priority: Medium     Overview:    Post-operative LE DVT       Personal history of other venous thrombosis and embolism 03/10/2007     Priority: Medium     Formatting of this note might be different from the original.  Formatting of this note might be different from the original.  Post-operative LE DVT  Formatting of this note might be different from the original.  Overview:   Post-operative LE DVT    Last Assessment & Plan:   Formatting of this note might be different from the original.  Post surgery was on anticoagulant afterwards. Off anticoagulants since        Fibrocystic breast changes 2006     Priority: Medium     Sinusitis, chronic 2000     Priority: Medium     Overview:   Chronic  IMO Update 10/11       Deviated nasal septum 2000     Priority: Medium        Past Medical History:    Past Medical History:   Diagnosis Date     Cervicalgia      Deviated nasal septum      Embolism and thrombosis (H)      Fibrocystic breast disease      Migraine      Prothrombin mutation (H)      Sinusitis        Past Surgical History:    Past Surgical History:   Procedure Laterality Date     ABDOMINOPLASTY        SECTION      x2     COLONOSCOPY - HIM SCAN  2011    Colonoscopy 2011     COLONOSCOPY - HIM SCAN  2018    Procedure:  Colonoscopy diagnostic with polypectomy and biopsies;  Surgeon:  Danelle Swift MD;  Location:  [facility] OR     COLONOSCOPY - HIM SCAN  2007    Colonoscopy, flex, diagnostic     DILATE CERVIX, HYSTEROSCOPY, ABLATE ENDOMETRIUM, COMBINED N/A 2016    Procedure: COMBINED DILATE CERVIX, HYSTEROSCOPY, ABLATE ENDOMETRIUM;  Surgeon: Pacheco Curry MD;  Location: [facility] OR     ESOPHAGOGASTRODUODENOSCOPY      negative     ESOPHAGOSCOPY, GASTROSCOPY, DUODENOSCOPY (EGD), COMBINED N/A 2020    Procedure: Upper Endoscopy with biopsy;  Surgeon: Son Cooper MD;  Location: [facility] OR     ORTHOPEDIC SURGERY Left     rotator cuff repair and bicep tendon repair     ZZC BREAST AUGMENTATION        ZZC LAP CHOLECYSTOSOMY         Family History:    No family history on file.    Social History:  Marital Status:   [2]  Social History     Tobacco Use     Smoking status: Former     Years: 3.00     Types: Cigarettes     Quit date:      Years since quittin.1     Smokeless tobacco: Never   Substance Use Topics     Alcohol use: Yes     Comment: weekly     Drug use: No        Medications:    biotin 1000 MCG TABS tablet  budesonide (PULMICORT) 0.5 MG/2ML neb solution  cholecalciferol (VITAMIN D3) 25 mcg (1000 units) capsule  conjugated estrogens (PREMARIN) 0.625 MG/GM vaginal cream  fluticasone (FLONASE) 50 MCG/ACT nasal spray  omeprazole (PRILOSEC) 40 MG DR capsule          Review of Systems   Constitutional: Positive for appetite change. Negative for fever.   Gastrointestinal: Positive for abdominal pain and diarrhea. Negative for blood in stool, constipation, nausea and vomiting.   Genitourinary: Negative for difficulty urinating.       Physical Exam   BP: 141/90  Pulse: 77  Temp: 98.3  F (36.8  C)  Resp: 20  SpO2: 98 %      Physical Exam  Vitals and nursing note reviewed.   Constitutional:       General: She is not in acute distress.     Appearance: She is not ill-appearing.   Abdominal:      General: Bowel sounds are increased. There is distension.      Palpations: Abdomen is soft.      Tenderness: There is abdominal tenderness in the right lower quadrant. Negative signs include McBurney's sign.   Skin:     General: Skin is warm and dry.   Neurological:      Mental Status: She is alert.   Psychiatric:         Mood and Affect: Mood normal.         ED Course                 Procedures              Critical Care time:  none               Results for orders placed or performed during the hospital encounter of 23 (from the past 24 hour(s))   CBC with Platelets & Differential    Narrative    The following orders were created for panel order CBC with Platelets & Differential.  Procedure                                Abnormality         Status                     ---------                               -----------         ------                     CBC with platelets and d...[[id number]]                      Final result                 Please view results for these tests on the individual orders.   Basic metabolic panel   Result Value Ref [place]    Sodium 140 136 - 145 mmol/L    Potassium 3.7 3.4 - 5.3 mmol/L    Chloride 102 98 - 107 mmol/L    Carbon Dioxide (CO2) 26 22 - 29 mmol/L    Anion Gap 12 7 - 15 mmol/L    Urea Nitrogen 7.3 6.0 - 20.0 mg/dL    Creatinine 0.64 0.51 - 0.95 mg/dL    Calcium 9.0 8.6 - 10.0 mg/dL    Glucose 83 70 - 99 mg/dL    GFR Estimate >90 >60 mL/min/1.73m2   Hepatic function panel   Result Value Ref [place]    Protein Total 6.6 6.4 - 8.3 g/dL    Albumin 4.0 3.5 - 5.2 g/dL    Bilirubin Total 0.8 <=1.2 mg/dL    Alkaline Phosphatase 65 35 - 104 U/L    AST 15 10 - 35 U/L    ALT 12 10 - 35 U/L    Bilirubin Direct <0.20 0.00 - 0.30 mg/dL   CBC with platelets and differential   Result Value Ref [place]    WBC Count 9.9 4.0 - 11.0 10e3/uL    RBC Count 4.89 3.80 - 5.20 10e6/uL    Hemoglobin 14.6 11.7 - 15.7 g/dL    Hematocrit 43.4 35.0 - 47.0 %    MCV 89 78 - 100 fL    MCH 29.9 26.5 - 33.0 pg    MCHC 33.6 31.5 - 36.5 g/dL    RDW 12.9 10.0 - 15.0 %    Platelet Count 361 150 - 450 10e3/uL    % Neutrophils 67 %    % Lymphocytes 23 %    % Monocytes 8 %    % Eosinophils 0 %    % Basophils 1 %    % Immature Granulocytes 1 %    NRBCs per 100 WBC 0 <1 /100    Absolute Neutrophils 6.7 1.6 - 8.3 10e3/uL    Absolute Lymphocytes 2.3 0.8 - 5.3 10e3/uL    Absolute Monocytes 0.8 0.0 - 1.3 10e3/uL    Absolute Eosinophils 0.0 0.0 - 0.7 10e3/uL    Absolute Basophils 0.1 0.0 - 0.2 10e3/uL    Absolute Immature Granulocytes 0.1 <=0.4 10e3/uL    Absolute NRBCs 0.0 10e3/uL   Extra Tube    Narrative    The following orders were created for panel order Extra Tube.  Procedure                               Abnormality          Status                     ---------                               -----------         ------                     Extra Blue Top Tube[[id number]]                              Final result               Extra Red Top Tube[[id number]]                               Final result               Extra Green Top (Lithium...[[id number]]                      Final result                 Please view results for these tests on the individual orders.   Extra Blue Top Tube   Result Value Ref [place]    Hold Specimen JIC    Extra Red Top Tube   Result Value Ref [place]    Hold Specimen JIC    Extra Green Top (Lithium Heparin) ON ICE   Result Value Ref [place]    Hold Specimen JIC    UA with Microscopic reflex to Culture    Specimen: Urine, Midstream   Result Value Ref [place]    Color Urine Yellow Colorless, Straw, Light Yellow, Yellow    Appearance Urine Clear Clear    Glucose Urine Negative Negative mg/dL    Bilirubin Urine Negative Negative    Ketones Urine 40 (A) Negative mg/dL    Specific Gravity Urine 1.022 1.003 - 1.035    Blood Urine Negative Negative    pH Urine 5.0 4.7 - 8.0    Protein Albumin Urine Negative Negative mg/dL    Urobilinogen Urine Normal Normal, 2.0 mg/dL    Nitrite Urine Negative Negative    Leukocyte Esterase Urine Negative Negative    Bacteria Urine Few (A) None Seen /HPF    Mucus Urine Present (A) None Seen /LPF    RBC Urine 1 <=2 /HPF    WBC Urine 1 <=5 /HPF    Squamous Epithelials Urine 0 <=1 /HPF    Narrative    Urine Culture not indicated   CT Abdomen Pelvis w Contrast    Narrative    PROCEDURE:  CT ABDOMEN PELVIS W CONTRAST    HISTORY: Lower abdominal pain and bloating.    TECHNIQUE: Helical CT of the abdomen and pelvis was performed  following injection of intravenous contrast. This CT exam was  performed using one or more the following dose reduction techniques:  automated exposure control, adjustment of the mA and/or kV according  to patient size, and/or iterative reconstruction technique.    COMPARISON:  "11/1/22.    MEDS/CONTRAST: Isovue 370 75ml Given    FINDINGS:      Limited images through the lung bases demonstrate no focal  consolidation or mass.     The liver demonstrates no mass or intrahepatic biliary ductal  dilatation. Gallbladder is surgically absent. The spleen, pancreas and  adrenal glands are unremarkable. Symmetric nephrograms are present  without hydronephrosis. There is no abdominal aortic aneurysm.    The bowel is normal in caliber. A few colonic diverticula are present  without evidence of acute inflammation.. The left adnexal cyst or  dominant follicle measures 2.5 cm. Nabothian cysts are noted. The left  canal vein measures 7 mm. Mildly prominent paraovarian veins are seen.    No suspicious osseous lesions are identified.      Impression    IMPRESSION:      Mild dilation of the left ovarian vein can be seen in pelvic  congestion syndrome.    DARELL CHOWDARY MD         SYSTEM ID:  [id number]       Medications   iopamidol (ISOVUE-370) solution 75 mL (75 mLs Intravenous Given 1/31/23 1802)   sodium chloride (PF) 0.9% PF flush 60 mL (50 mLs Intravenous Given 1/31/23 1802)       Assessments & Plan (with Medical Decision Making)   Patient was seen in conjunction with JERZY Steele.  However, the patient was examined and interviewed independently by myself.  52-year-old female with several days of \"bloating\" as well as generalized discomfort.  Patient is concerned about diverticulitis or other intra-abdominal process.  Differential diagnosis is broad and includes but is not limited to acute pancreatitis, acute appendicitis, small bowel obstruction, aortic dissection, diverticulitis, gaseous distention, gastritis, mesenteric ischemia, musculoskeletal source, acute pyelonephritis.  We did believe that imaging was reasonable.  Patient relieved with CT results.  The possible pelvic congestion can be discussed with her primary physician.  At this time I can find no reasonable or compelling medical " indication for further laboratory evaluation or imaging on an emergent basis.  Return here as needed.  See discharge instructions.    This document was prepared using a combination of typing and voice generated software.  While every attempt was made for accuracy, spelling and grammatical errors may exist.    I have reviewed the nursing notes.    I have reviewed the findings, diagnosis, plan and need for follow up with the patient.       Medical Decision Making  The patient's presentation is strongly suggestive of an undiagnosed new problem with uncertain diagnosis.    The patient's evaluation involved:  ordering and/or review of 3+ test(s) in this encounter (see separate area of note for details)    The patient's management involved only low risk treatment.        New Prescriptions    No medications on file       Final diagnoses:   Abdominal pain of unknown etiology       1/31/2023   [facility] EM[facility]     Charito Duque PA-C  01/31/23 1952

## 2023-01-31 NOTE — ED NOTES
Upper abdominal bloating. Loose stool off and on for weak due to her taking laxative.denies emesis.2/10 lower quad

## 2023-01-31 NOTE — ED TRIAGE NOTES
"Pt complains of \"bloating\" over the last week, and states she hasn't eaten in the past 2 days. Pt reports hx of diverticulitis. Pt states some diarrhea, denies NV. Pt also reports taking a stool softener. Pt denies chest pain, SOB.      "

## 2023-02-01 NOTE — DISCHARGE INSTRUCTIONS
What to expect when you have contrast    During your exam, we will inject  contrast  into your vein or artery. (Contrast is a clear liquid with iodine in it. It shows up on X-rays.)    You may feel warm or hot. You may have a metal taste in your mouth and a slight upset stomach. You may also feel pressure near the kidneys and bladder. These effects will last about 1 to 3 minutes.    Please tell us if you have:   Sneezing    Itching   Hives    Swelling in the face   A hoarse voice   Breathing problems   Other new symptoms    Serious problems are rare.  They may include:   Irregular heartbeat    Seizures   Kidney failure             Tissue damage   Shock     Death    If you have any problems during the exam, we  will treat them right away.    When you get home    Call your hospital if you have any new symptoms in the next 2 days, like hives or swelling. (Phone numbers are at the bottom of this page.) Or call your family doctor.     If you have wheezing or trouble breathing, call 911.    Self-care  -Drink at least 4 extra glasses of water today.   This reduces the stress on your kidneys.  -Keep taking your regular medicines.    The contrast will pass out of your body in your  Urine(pee). This will happen in the next 24 hours. You  will not feel this. Your urine will not  change color.    If you have kidney problems or take metformin    Drink 4 to 8 large glasses of water for the next  2 days, if you are not on a fluid restriction.    ?If you take metformin (Glucophage or Glucovance) for diabetes, keep taking it.      ?Your kidney function tests are abnormal.  If you take Metformin, do not take it for 48 hours. Please go to your clinic for a blood test within 3 days after your exam before the restarting this medicine.     (Note to provider:please give patient prescription for lab tests.)    ?Special instructions:     I have read and understand the above information.    Patient Sign  "Here:______________________________________Date:________Time:______    Staff Sign Here:________________________________________Date:_______Time:______      Radiology Departments:     ?[facility]: [phone] ?[place]: [phone]     ?[place]: [phone] ?[facility]:[phone]      ?[place]: [phone]  ?[facility]: [phone]  ?[facility]:[phone]    ?[facility]:[phone]  ?[facility]:[phone]    The CT scan showed possible dilated ovarian veins that can be seen in pelvic congestion syndrome.  This can be discussed further with your primary care provider.    There were no other emergent findings at this time.  This should not be construed as \"nothing is wrong.\"  It simply means that at this time there is no indication for hospitalization or transfer.    Please return here for any new or worsening symptoms.  Advance diet as tolerated.  "

## 2023-02-06 ENCOUNTER — HOSPITAL ENCOUNTER (OUTPATIENT)
Dept: ULTRASOUND IMAGING | Facility: HOSPITAL | Age: 53
Discharge: HOME | End: 2023-02-06
Attending: FAMILY MEDICINE
Payer: COMMERCIAL

## 2023-02-06 ENCOUNTER — ANCILLARY PROCEDURE (OUTPATIENT)
Dept: MAMMOGRAPHY | Facility: OTHER | Age: 53
End: 2023-02-06
Attending: FAMILY MEDICINE
Payer: COMMERCIAL

## 2023-02-06 DIAGNOSIS — Z12.31 VISIT FOR SCREENING MAMMOGRAM: ICD-10-CM

## 2023-02-06 DIAGNOSIS — R92.8 ABNORMAL FINDING ON MAMMOGRAPHY: ICD-10-CM

## 2023-02-06 DIAGNOSIS — N64.4 BREAST PAIN, LEFT: ICD-10-CM

## 2023-02-06 PROCEDURE — G0279 TOMOSYNTHESIS, MAMMO: HCPCS | Mod: TC | Performed by: RADIOLOGY

## 2023-02-06 PROCEDURE — 77066 DX MAMMO INCL CAD BI: CPT | Mod: TC | Performed by: RADIOLOGY

## 2023-02-06 PROCEDURE — 76642 ULTRASOUND BREAST LIMITED: CPT | Mod: LT

## 2023-02-07 ENCOUNTER — TELEPHONE (OUTPATIENT)
Dept: MAMMOGRAPHY | Facility: OTHER | Age: 53
End: 2023-02-07

## 2023-02-14 ENCOUNTER — OFFICE VISIT (OUTPATIENT)
Dept: OBGYN | Facility: OTHER | Age: 53
End: 2023-02-14
Attending: OBSTETRICS & GYNECOLOGY
Payer: COMMERCIAL

## 2023-02-14 VITALS
SYSTOLIC BLOOD PRESSURE: 130 MMHG | HEART RATE: 76 BPM | HEIGHT: 64 IN | DIASTOLIC BLOOD PRESSURE: 60 MMHG | OXYGEN SATURATION: 99 % | BODY MASS INDEX: 27.31 KG/M2 | WEIGHT: 160 LBS

## 2023-02-14 DIAGNOSIS — N95.1 VAGINAL DRYNESS, MENOPAUSAL: Primary | ICD-10-CM

## 2023-02-14 DIAGNOSIS — R10.84 ABDOMINAL PAIN, GENERALIZED: ICD-10-CM

## 2023-02-14 PROCEDURE — 99214 OFFICE O/P EST MOD 30 MIN: CPT | Performed by: OBSTETRICS & GYNECOLOGY

## 2023-02-14 NOTE — PROGRESS NOTES
S:  ED f/u  51 yo P2 menopausal f presented to ED two weeks ago with abdominal pain.  Pain was described as sharp with associated anorexia, bloating and diarrhea.   She has h/o IBS/diverticulits/cholecystectomy and abdominoplasty. + vaginal dryness with intercourse but no other pelvic or vasomotor sx.    Pain has resolved.  A CT scan was performed with incidental finding of prominent periuterine veins. She has h/o DVT and not a candidate for ERT. She was told to f/u with GYN due to CT findings/possible pelvic congestion syndrome. Denies hematuria or other  sx.     Pap UTD.   ED notes/imaging/labs reviewed       Patient Active Problem List   Diagnosis     Menorrhagia     Sinusitis, chronic     Prothrombin [id number] mutation (H)     S/P abdominoplasty     Irritable bowel syndrome with diarrhea     Iron deficiency     H/O deep venous thrombosis     Cervicalgia     Deviated nasal septum     Fibrocystic breast changes     Ulcer of esophagus     Biliary dyskinesia     Diverticulitis     Colon cancer screening     Prolapsed internal hemorrhoids     Migraine     Other specified postprocedural states     Pain of foot     Arthralgia of hip     Personal history of other venous thrombosis and embolism     Shoulder pain     Other specified diseases of gallbladder     Abdominal pain            Past Medical History:   Diagnosis Date     Cervicalgia      Deviated nasal septum      Embolism and thrombosis (H)     unspecified site     Fibrocystic breast disease      Migraine      Prothrombin mutation (H)      Sinusitis             Past Surgical History:   Procedure Laterality Date     ABDOMINOPLASTY        SECTION      x2     COLONOSCOPY - HIM SCAN  2011    Colonoscopy 2011     COLONOSCOPY - HIM SCAN  2018    Procedure:  Colonoscopy diagnostic with polypectomy and biopsies;  Surgeon:  Danelle Swift MD;  Location:  [facility] OR     COLONOSCOPY - HIM SCAN  2007    Colonoscopy, flex, diagnostic     DILATE  CERVIX, HYSTEROSCOPY, ABLATE ENDOMETRIUM, COMBINED N/A 2016    Procedure: COMBINED DILATE CERVIX, HYSTEROSCOPY, ABLATE ENDOMETRIUM;  Surgeon: Pacheco Curry MD;  Location: [facility] OR     ESOPHAGOGASTRODUODENOSCOPY      negative     ESOPHAGOSCOPY, GASTROSCOPY, DUODENOSCOPY (EGD), COMBINED N/A 2020    Procedure: Upper Endoscopy with biopsy;  Surgeon: Son Cooper MD;  Location: [facility] OR     ORTHOPEDIC SURGERY Left     rotator cuff repair and bicep tendon repair     ZZC BREAST AUGMENTATION       ZZC LAP CHOLECYSTOSOMY              Social History     Tobacco Use     Smoking status: Former     Years: 3.00     Types: Cigarettes     Quit date:      Years since quittin.1     Smokeless tobacco: Never   Substance Use Topics     Alcohol use: Yes     Comment: weekly          No family history on file.            Allergies   Allergen Reactions     Augmentin Diarrhea     C diff     Ciprofloxacin Unknown     Tingling in feet     Doxycycline Unknown     Flagyl [Metronidazole] Other (See Comments)     Food      Mangoes cause rash     Sulfamethoxazole W/Trimethoprim Rash            Current Outpatient Medications   Medication Sig Dispense Refill     biotin 1000 MCG TABS tablet Take 1,000 mcg by mouth daily       cholecalciferol (VITAMIN D3) 25 mcg (1000 units) capsule Take 1 capsule by mouth daily       fluticasone (FLONASE) 50 MCG/ACT nasal spray SHAKE LIQUID AND USE 2 SPRAYS IN EACH NOSTRIL DAILY 16 g 0     omeprazole (PRILOSEC) 40 MG DR capsule Take 1 capsule (40 mg) by mouth daily 90 capsule 1     budesonide (PULMICORT) 0.5 MG/2ML neb solution Squirt entire vial into freedom med saline solution, mix, and irrigate each nostril until entire bottle empty.  Do this twice daily. (Patient not taking: Reported on 2023) 200 mL 11     conjugated estrogens (PREMARIN) 0.625 MG/GM vaginal cream Place 0.5 g vaginally twice a week (Patient not taking: Reported on 2023) 30 g 1          Review Of  "Systems  Constitutional:  Denies fever  GI/ negative except as noted per hpi    O:   /60 (BP Location: Left arm, Patient Position: Left side, Cuff Size: Adult Regular)   Pulse 76   Ht 1.626 m (5' 4\")   Wt 72.6 kg (160 lb)   LMP 04/11/2016 (LMP Unknown)   SpO2 99%   BMI 27.46 kg/m    Gen:  NAD, A and O  Abd soft, NT, ND         A:    Abd pain, resolved.  Likely GI etiology, IBS.  Vaginal dryness with intercourse  Menopause.     P:    No evidence of GYN pathology.  Incidental finding of paraovarian/periuterine varices common in multiparous women.  Does not have classic sx of pelvic congestion (pain/ aching with prolonged standing/intercource, uterine and adnexal tenderness on exam).  Pelvic congestion syndrome is not a condition described in postmenopausal women.  We discussed OTC treatment options/lubicants for intercourse/vaginal dryness. Declines vaginal estrogen due to h/o DVT. She will f/u PRN if continue or problems or at annual exam. 30  minutes were spent on the patient visit in face-to-face counseling, review or records, charting,  and coordination of care.              "

## 2023-05-03 NOTE — PROGRESS NOTES
Assessment & Plan     Hair loss  Update labs.  Replace vitamins if low.    Encouraged healthy diet, appropriate vitamin.  May be hormonal, menopausal - vs stress related.  - Ferritin; Future  - Iron and iron binding capacity; Future  - TSH with free T4 reflex; Future  - Vitamin B12; Future  - Vitamin D Deficiency; Future  - Ferritin  - Iron and iron binding capacity  - TSH with free T4 reflex  - Vitamin B12  - Vitamin D Deficiency    Abdominal bloating  Intermittent.  Prior diverticulitis.  Prior endoscopies.  Worse with gluten.   Discussed allergy vs intolerance.  Check labs.  Food journal and elimination diet possibility discussed.  - CRP, inflammation; Future  - Tissue transglutaminase mehrdad IgA and IgG; Future  - Endomysial Antibody IgA by IFA; Future  - CRP, inflammation  - Tissue transglutaminase mehrdad IgA and IgG  - Endomysial Antibody IgA by IFA       See Patient Instructions    Return if symptoms worsen or fail to improve.    Jenelle Rivera MD  [facility] - JOHANA Martin is a 53 year old, presenting for the following health issues:  hair loss issues      HPI     Concern - Hair loss  concerns  Onset: about a month ago  Description: hair loss issues;had low iron in the past and experienced the same issue.   Intensity: mild  Progression of Symptoms:  same and constant  Accompanying Signs & Symptoms: same  Previous history of similar problem: yes  Precipitating factors:        Worsened by: none  Alleviating factors:        Improved by: none  Therapies tried and outcome: None  Not focal - diffuse.  No scalp change.   Some loss on body - arms.  Patch of missing lash - left upper - couple weeks; using Latisse product x 1 year.  No prior anemia.  No abnormal bleeding.  Ongoing stressors of chronic GI issues - prior diverticulitis.  Biotin and D.  Abdominal pain, bloating, diarrhea//constipation - alternates.  No fever. No abnormal bleeding.    Lump on abdomen    Duration: about a  "week    Description (location/character/radiation): upper abdominal area    Intensity:  mild    Accompanying signs and symptoms: none    History (similar episodes/previous evaluation): None    Precipitating or alleviating factors: None    Therapies tried and outcome: None    Had tummy tuck 20 years ago    Also had cool sculpting - most recent 3/2023  - [facility] - no ongoing follow up     Mole removed.  Atypical.      Review of Systems   Constitutional, HEENT, cardiovascular, pulmonary, gi and gu systems are negative, except as otherwise noted.      Objective    /62 (BP Location: Left arm, Patient Position: Sitting, Cuff Size: Adult Regular)   Pulse 76   Temp 97.9  F (36.6  C) (Tympanic)   Ht 1.626 m (5' 4\")   Wt 72.6 kg (160 lb)   LMP 04/11/2016 (LMP Unknown)   SpO2 98%   BMI 27.46 kg/m    Body mass index is 27.46 kg/m .  Physical Exam   GENERAL: healthy, alert and no distress  EYES: Eyes grossly normal to inspection, PERRL and conjunctivae and sclerae normal  NECK: no adenopathy, no asymmetry, masses, or scars and thyroid normal to palpation  RESP: lungs clear to auscultation - no rales, rhonchi or wheezes  CV: regular rate and rhythm, normal S1 S2, no S3 or S4, no murmur, click or rub, no peripheral edema and peripheral pulses strong  ABDOMEN: soft, nontender, no hepatosplenomegaly, no masses and bowel sounds normal  ABDOMEN: no definitive mass palpated - some asymmetry  - slight right ventral; s/p abdominoplasty  MS: no gross musculoskeletal defects noted, no edema  SKIN: no suspicious lesions or rashes  PSYCH: mentation appears normal, affect normal/bright    Labs pending                "

## 2023-05-05 ENCOUNTER — OFFICE VISIT (OUTPATIENT)
Dept: FAMILY MEDICINE | Facility: OTHER | Age: 53
End: 2023-05-05
Attending: FAMILY MEDICINE
Payer: COMMERCIAL

## 2023-05-05 VITALS
BODY MASS INDEX: 27.31 KG/M2 | SYSTOLIC BLOOD PRESSURE: 130 MMHG | DIASTOLIC BLOOD PRESSURE: 62 MMHG | HEIGHT: 64 IN | WEIGHT: 160 LBS | TEMPERATURE: 97.9 F | HEART RATE: 76 BPM | OXYGEN SATURATION: 98 %

## 2023-05-05 DIAGNOSIS — R14.0 ABDOMINAL BLOATING: ICD-10-CM

## 2023-05-05 DIAGNOSIS — L65.9 HAIR LOSS: Primary | ICD-10-CM

## 2023-05-05 LAB
CRP SERPL-MCNC: 11.67 MG/L
FERRITIN SERPL-MCNC: 132 NG/ML (ref 11–328)
IRON BINDING CAPACITY (ROCHE): 314 UG/DL (ref 240–430)
IRON SATN MFR SERPL: 34 % (ref 15–46)
IRON SERPL-MCNC: 107 UG/DL (ref 37–145)
TSH SERPL DL<=0.005 MIU/L-ACNC: 1.41 UIU/ML (ref 0.3–4.2)
VIT B12 SERPL-MCNC: 352 PG/ML (ref 232–1245)

## 2023-05-05 PROCEDURE — 83540 ASSAY OF IRON: CPT | Performed by: FAMILY MEDICINE

## 2023-05-05 PROCEDURE — 86231 EMA EACH IG CLASS: CPT | Mod: 90 | Performed by: FAMILY MEDICINE

## 2023-05-05 PROCEDURE — 99213 OFFICE O/P EST LOW 20 MIN: CPT | Performed by: FAMILY MEDICINE

## 2023-05-05 PROCEDURE — 83550 IRON BINDING TEST: CPT | Performed by: FAMILY MEDICINE

## 2023-05-05 PROCEDURE — 82728 ASSAY OF FERRITIN: CPT | Performed by: FAMILY MEDICINE

## 2023-05-05 PROCEDURE — 84443 ASSAY THYROID STIM HORMONE: CPT | Performed by: FAMILY MEDICINE

## 2023-05-05 PROCEDURE — 86364 TISS TRNSGLTMNASE EA IG CLAS: CPT | Performed by: FAMILY MEDICINE

## 2023-05-05 PROCEDURE — 36415 COLL VENOUS BLD VENIPUNCTURE: CPT | Performed by: FAMILY MEDICINE

## 2023-05-05 PROCEDURE — 82607 VITAMIN B-12: CPT | Performed by: FAMILY MEDICINE

## 2023-05-05 PROCEDURE — 82306 VITAMIN D 25 HYDROXY: CPT | Performed by: FAMILY MEDICINE

## 2023-05-05 PROCEDURE — 86140 C-REACTIVE PROTEIN: CPT | Performed by: FAMILY MEDICINE

## 2023-05-05 ASSESSMENT — PAIN SCALES - GENERAL: PAINLEVEL: NO PAIN (0)

## 2023-05-06 LAB — DEPRECATED CALCIDIOL+CALCIFEROL SERPL-MC: 46 UG/L (ref 20–75)

## 2023-05-08 LAB — ENDOMYSIUM IGA TITR SER IF: NORMAL {TITER}

## 2023-05-10 LAB
TTG IGA SER-ACNC: 0.9 U/ML
TTG IGG SER-ACNC: <0.6 U/ML

## 2023-06-03 NOTE — ED AVS SNAPSHOT
[facility]    [address] [address]    Phone:  [phone]                                       Veronica Ansari   MRN: [id number]    Department:  [facility]   Date of Visit:  3/1/2017           Patient Information     Date Of Birth          1970        Your diagnoses for this visit were:     Constipation, unspecified constipation type     Right lower quadrant pain        You were seen by Charito Duque PA-C.      Follow-up Information     Follow up with Pamela Zhou MD.    Specialty:  Family Practice    Why:  As needed    Contact information:      [address] [address] [phone]          Follow up with [facility].    Specialty:  EMERGENCY MEDICINE    Why:  If symptoms worsen    Contact information:    [address] [address] [phone]    Additional information:    From [facility]: Take [address]. Turn left at [address]. Turn left at the first stoplight on [address]. At the first stop sign, take a right onto [address]. Take a left into the parking lot and continue through until you reach the North enterance of the building.       From [place]: Take [address]. Take the [address] ramp towards [place]. Turn left onto [address]. Take a slight right onto [address]. Turn left at the first stoplight on [address]. At the first stop sign, take a right onto [address]. Take a left into the parking lot and continue through until you reach the North enterance of the building.       From [place]: Take [address]. Take a right at [address]. At the first stop sign, take a right onto [address]. Take a left into the parking lot and continue through until you reach the North enterance of the building.         Discharge Instructions       You may trial magnesium citrate 1/2 bottle tonight and then  PGY 2 Note    Chief Complaint: Postpartum    HPI: Pt doing well, pain well controlled. No overnight events, no acute complaints. Denies fevers, chills, SOB, CP, nausea, vomiting, diarrhea, constipation or dysuria.    ROS: Denies cardiovascular or respiratory symptoms    PAST MEDICAL & SURGICAL HISTORY:  No pertinent past medical history  No significant past surgical history    Physical Exam  Vital Signs Last 24 Hrs  T(F): 98.8 (02 Jun 2023 23:17), Max: 99.3 (02 Jun 2023 15:30)  HR: 73 (02 Jun 2023 23:17) (73 - 87)  BP: 88/52 (02 Jun 2023 23:17) (88/52 - 109/62)  RR: 18 (02 Jun 2023 23:17) (18 - 18)    Physical exam:  General - AAOx3, NAD  Heart - S1S2, RRR  Lungs - CTA BL  Abdomen:  - Soft, nontender, nondistended, BS+  - Fundus firm, nontender, below the umbilicus  Pelvis/Vagina - Normal Lochia  Extremities - No calf tenderness, no swelling    Labs:                        10.0   11.67 )-----------( 174      ( 02 Jun 2023 11:26 )             31.1                         10.7   7.60  )-----------( 182      ( 01 Jun 2023 08:55 )             32.2     ABO RH Interpretation: O POS (06-01-23 @ 09:08)  Antibody Screen: NEG (06-01-23 @ 09:08)     fhr 120s gbtbv  ctx q 2 min  sve 4/50/-2  monitor closely. 1/2 bottle in the morning to help with bowel movement.    You may also use Miralax 119 gram bottle mixed with liquid of choice.  Drink 1/2 the bottle and 4 hours later, drink the remaining half.     As we discussed, the right sided abdominal pain can happen with constipation.  However, this can also happen with any other abdominal process.  The most concerning being acute appendicitis.  We have mutually agreed to not obtain labs or CT scan.  Please watch for ANY worsening pain, pain that changes in quality, location, or character, or ANY fevers.  You should return HERE promptly for those symptoms or any other concerns.        Review of your medicines      Our records show that you are taking the medicines listed below. If these are incorrect, please call your family doctor or clinic.        Dose / Directions Last dose taken    DOCUSATE SODIUM PO   Dose:  100 mg        Take 100 mg by mouth 2 times daily   Refills:  0        ferrous sulfate 325 (65 FE) MG tablet   Commonly known as:  IRON   Indication:  three times a week        Take by mouth daily (with breakfast)   Refills:  0        GLUCOSAMINE SULFATE PO   Dose:  1000 mg        Take 1,000 mg by mouth 2 times daily   Refills:  0        isometheptene-acetaminophen-dichloralphenazone -100 MG per capsule   Commonly known as:  MIDRIN   Dose:  1 capsule        Take 1 capsule by mouth 2 times daily   Refills:  0        oxyCODONE-acetaminophen 5-325 MG per tablet   Commonly known as:  PERCOCET   Dose:  1-2 tablet        Take 1-2 tablets by mouth every 4 hours as needed for moderate to severe pain   Refills:  0                Procedures and tests performed during your visit     Soap suds enema    XR Abdomen 2 Views      Orders Needing Specimen Collection     None      Pending Results     Date and Time Order Name Status Description    3/1/2017 1857 XR Abdomen 2 Views In process             Pending Culture Results     No orders found from 2/27/2017 to 3/2/2017.        Patient seen at bedside, resting comfortably. No complaints at this time.    T(C): 36.7 (23 @ 08:42), Max: 36.7 (23 @ 08:42)  HR: 77 (23 @ 15:01) (71 - 86)  BP: 110/69 (23 @ 15:01) (95/55 - 122/75)  RR: 18 (23 @ 08:42) (18 - 18)    EFM: 150 bpm/mod/+accels  TOCO: q 2 mins  SVE: 3/50/-2, AROM clear    Meds:lactated ringers. 1000 milliLiter(s) IV Continuous <Continuous>  oxytocin Infusion 333.333 milliUNIT(s)/Min IV Continuous <Continuous>  oxytocin Infusion. 2 milliUNIT(s)/Min IV Continuous <Continuous>      Labs:                          10.7   7.60  )-----------( 182      ( 2023 08:55 )             32.2     ABO RH Interpretation: O POS  Prenatal Syphilis Test: Nonreact  Urinalysis (23 @ 08:55)   Glucose Qualitative, Urine: Negative  Blood, Urine: Negative  pH Urine: 6.5  Color: Yellow  Urine Appearance: Slightly Turbid  Bilirubin: Negative  Ketone - Urine: Negative  Specific Gravity: 1.022  Protein, Urine: Trace  Urobilinogen: <2 mg/dL  Nitrite: Positive  Leukocyte Esterase Concentration: Large  L&D Drug Screen, Urine: Done- Negaitve      A/P: +  24yo  at 37w2d, GBS negative, for scheduled IOL for intrahepatic cholestasis of pregnancy.     Plan:  Continue EFM/TOCO  Continue IV hydration  Continue Clear Liquid diet  Continue Pitocin  Pain management PRN    Dr. Jackson aware fhr 130s gbtbv  ctx q 2-4 min  pit at 12  sve 3/50/-2  will request epidural pt c/o pressure  fhr 120s gbtbv  ctx q 2 min  sve 9/80/-1  monitor closely "     Thank you for choosing [facility]       Thank you for choosing [facility] for your care. Our goal is always to provide you with excellent care. Hearing back from our patients is one way we can continue to improve our services. Please take a few minutes to complete the written survey that you may receive in the mail after you visit with us. Thank you!        [facility] Information     [employer] Card lets you send messages to your doctor, view your test results, renew your prescriptions, schedule appointments and more. To sign up, go to [email or web address] . Click on \"Log in\" on the left side of the screen, which will take you to the Welcome page. Then click on \"Sign up Now\" on the right side of the page.     You will be asked to enter the access code listed below, as well as some personal information. Please follow the directions to create your username and password.     Your access code is: [email or web address]  Expires: 2017  7:26 PM     Your access code will  in 90 days. If you need help or a new code, please call your [facility] clinic or [phone].        Care EveryWhere ID     This is your Care EveryWhere ID. This could be used by other organizations to access your [facility] medical records  [id number]        After Visit Summary       This is your record. Keep this with you and show to your community pharmacist(s) and doctor(s) at your next visit.                  " PGY 1 Note    Patient seen at bedside for evaluation of labor progression. Pitocin at 16U    T(F): 99.86 (18:47)  HR: 93 (19:08)  BP: 104/69 (19:08)    EFM: 135/mod/+accels  TOCO: @2-3m  SVE: 3/50/-2, IUPC and FSE placed    Labs:                        10.7   7.60  )-----------( 182      ( 2023 08:55 )             32.2         133<L>  |  103  |  12  ----------------------------<  83  4.3   |  20  |  0.5<L>    Ca    8.9      2023 08:55    TPro  6.7  /  Alb  3.4<L>  /  TBili  0.5  /  DBili  x   /  AST  84<H>  /  ALT  135<H>  /  AlkPhos  349<H>      ABO RH Interpretation: O POS (23 @ 09:08)    Urinalysis Basic - ( 2023 08:55 )    Color: Yellow / Appearance: Slightly Turbid / S.022 / pH: x  Gluc: x / Ketone: Negative  / Bili: Negative / Urobili: <2 mg/dL   Blood: x / Protein: Trace / Nitrite: Positive   Leuk Esterase: Large / RBC: 4 /HPF /  /HPF   Sq Epi: x / Non Sq Epi: x / Bacteria: Many          Meds: fentanyl (2 MICROgram(s)/mL) + bupivacaine 0.0625%  in 0.9% Sodium Chloride PCEA 250 milliLiter(s) Epidural PCA Continuous  lactated ringers. 1000 milliLiter(s) IV Continuous <Continuous>  oxytocin Infusion 333.333 milliUNIT(s)/Min IV Continuous <Continuous>  oxytocin Infusion. 2 milliUNIT(s)/Min IV Continuous <Continuous>     Patient seen and examined. Pain well controlled at this time. No complaints at this time. Denies fever, chills, nausea, vomiting, chest pain, shortness of breath, severe abdominal pain, heavy vaginal bleeding.     Patient is ambulating without difficulty.  Passing flatus.   Tolerating regular diet   Voiding without difficulty, no dysuria       Physical Exam  Vital Signs Last 24 Hrs  T(C): 36.8 (02 Jun 2023 07:48), Max: 37.7 (01 Jun 2023 18:47)  T(F): 98.3 (02 Jun 2023 07:48), Max: 99.86 (01 Jun 2023 18:47)  HR: 84 (02 Jun 2023 07:48) (66 - 119)  BP: 109/62 (02 Jun 2023 07:48) (95/55 - 126/82)  BP(mean): --  RR: 18 (02 Jun 2023 07:48) (18 - 18)  SpO2: 100% (01 Jun 2023 23:46) (97% - 100%)      Gen: AAOx3, NAD  Cardio: RRR, S1S2, no M/R/G  Resp: CTAB  Ext: No calf tenderness, no swelling  Fundus: firm, below umbilicus. Nontender.   Abd: Soft, nontender, nondistended, BS+  Lochia: minimal      Labs:                        10.7   7.60  )-----------( 182      ( 01 Jun 2023 08:55 )             32.2

## 2023-06-26 ENCOUNTER — MYC MEDICAL ADVICE (OUTPATIENT)
Dept: FAMILY MEDICINE | Facility: OTHER | Age: 53
End: 2023-06-26

## 2023-06-26 DIAGNOSIS — H02.729 HYPOTRICHOSIS OF EYELID, UNSPECIFIED LATERALITY: Primary | ICD-10-CM

## 2023-06-26 RX ORDER — BIMATOPROST 3 UG/ML
1 SOLUTION TOPICAL AT BEDTIME
Qty: 5 ML | Refills: 3 | Status: SHIPPED | OUTPATIENT
Start: 2023-06-26 | End: 2024-05-30

## 2023-07-20 ENCOUNTER — ANCILLARY PROCEDURE (OUTPATIENT)
Dept: GENERAL RADIOLOGY | Facility: OTHER | Age: 53
End: 2023-07-20
Attending: PODIATRIST
Payer: COMMERCIAL

## 2023-07-20 ENCOUNTER — OFFICE VISIT (OUTPATIENT)
Dept: PODIATRY | Facility: OTHER | Age: 53
End: 2023-07-20
Attending: PODIATRIST
Payer: COMMERCIAL

## 2023-07-20 VITALS
DIASTOLIC BLOOD PRESSURE: 88 MMHG | HEART RATE: 65 BPM | SYSTOLIC BLOOD PRESSURE: 132 MMHG | TEMPERATURE: 97.2 F | OXYGEN SATURATION: 98 %

## 2023-07-20 DIAGNOSIS — M79.671 RIGHT FOOT PAIN: ICD-10-CM

## 2023-07-20 DIAGNOSIS — M76.71 PERONEAL TENDINITIS OF RIGHT LOWER EXTREMITY: Primary | ICD-10-CM

## 2023-07-20 PROCEDURE — 73630 X-RAY EXAM OF FOOT: CPT | Mod: TC | Performed by: RADIOLOGY

## 2023-07-20 PROCEDURE — 99213 OFFICE O/P EST LOW 20 MIN: CPT | Performed by: PODIATRIST

## 2023-07-20 ASSESSMENT — PAIN SCALES - GENERAL: PAINLEVEL: NO PAIN (1)

## 2023-07-20 NOTE — PROGRESS NOTES
Chief complaint: Patient presents with:  Musculoskeletal Problem: Right foot pain      History of Present Illness: This 53 year old female is seen for pain of the RIGHT foot. Around 2023, she was walking aon a hard surface at home while barefooted when she felt a crack in her foot. She briefly had pain, but it was then a +1 or a +2 for the rest of the day. She started to get a very sharp pain the last two days and it would last at least a minute. She is wearing primarily Cloud Stepper sandals.     She said the last time she had x-rays, an old metatarsal fracture was noticed. She is wondering if she fractured her foot. Her pain is not consistent, but she will suddenly have a flare of pain when walking. The flares are becoming more prominent and more consistent.    No further pedal complaints today.       Patient does not use tobacco products.       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)     S/P abdominoplasty     Irritable bowel syndrome with diarrhea     Iron deficiency     H/O deep venous thrombosis     Cervicalgia     Deviated nasal septum     Fibrocystic breast changes     Ulcer of esophagus     Biliary dyskinesia     Diverticulitis     Colon cancer screening     Prolapsed internal hemorrhoids     Migraine     Other specified postprocedural states     Pain of foot     Arthralgia of hip     Personal history of other venous thrombosis and embolism     Shoulder pain     Other specified diseases of gallbladder     Abdominal pain       Past Surgical History:   Procedure Laterality Date     ABDOMINOPLASTY        SECTION      x2     COLONOSCOPY - HIM SCAN  2011    Colonoscopy 2011     COLONOSCOPY - HIM SCAN  2018    Procedure:   Colonoscopy diagnostic with polypectomy and biopsies;  Surgeon:  Danelle Swift MD;  Location:  -[facility] OR     COLONOSCOPY - HIM SCAN  2007    Colonoscopy, flex, diagnostic     DILATE CERVIX, HYSTEROSCOPY, ABLATE ENDOMETRIUM, COMBINED N/A 2016    Procedure: COMBINED DILATE CERVIX, HYSTEROSCOPY, ABLATE ENDOMETRIUM;  Surgeon: Pacheco Curry MD;  Location: [facility]     ESOPHAGOGASTRODUODENOSCOPY      negative     ESOPHAGOSCOPY, GASTROSCOPY, DUODENOSCOPY (EGD), COMBINED N/A 2020    Procedure: Upper Endoscopy with biopsy;  Surgeon: Son Cooper MD;  Location: [facility] OR     ORTHOPEDIC SURGERY Left     rotator cuff repair and bicep tendon repair     ZZC BREAST AUGMENTATION       ZZC LAP CHOLECYSTOSOMY         Current Outpatient Medications   Medication     bimatoprost (LATISSE) 0.03 % external opthalmic solution     biotin 1000 MCG TABS tablet     cholecalciferol (VITAMIN D3) 25 mcg (1000 units) capsule     cyanocobalamin (VITAMIN B-12) 500 MCG SUBL sublingual tablet     fluticasone (FLONASE) 50 MCG/ACT nasal spray     omeprazole (PRILOSEC) 40 MG DR capsule     budesonide (PULMICORT) 0.5 MG/2ML neb solution     No current facility-administered medications for this visit.          Allergies   Allergen Reactions     Amoxicillin-Pot Clavulanate Diarrhea     C diff     Ciprofloxacin Unknown     Tingling in feet     Doxycycline Unknown     Flagyl [Metronidazole] Other (See Comments)     Food      Mangoes cause rash     Sulfamethoxazole-Trimethoprim Rash       History reviewed. No pertinent family history.    Social History     Socioeconomic History     Marital status:      Spouse name: None     Number of children: None     Years of education: None     Highest education level: None   Occupational History     None   Tobacco Use     Smoking status: Former Smoker     Years: 3.00     Types: Cigarettes     Quit date:      Years since quittin.5     Smokeless tobacco: Never Used   Substance and  Sexual Activity     Alcohol use: Yes     Alcohol/week: 0.0 standard drinks     Comment: Occassional     Drug use: No     Sexual activity: None   Other Topics Concern     Parent/sibling w/ CABG, MI or angioplasty before 65F 55M? Not Asked   Social History Narrative     None     Social Determinants of Health     Financial Resource Strain:      Difficulty of Paying Living Expenses:    Food Insecurity:      Worried About Running Out of Food in the Last Year:      Ran Out of Food in the Last Year:    Transportation Needs:      Lack of Transportation (Medical):      Lack of Transportation (Non-Medical):    Physical Activity:      Days of Exercise per Week:      Minutes of Exercise per Session:    Stress:      Feeling of Stress :    Social Connections:      Frequency of Communication with Friends and Family:      Frequency of Social Gatherings with Friends and Family:      Attends Mosque Services:      Active Member of Clubs or Organizations:      Attends Club or Organization Meetings:      Marital Status:    Intimate Partner Violence:      Fear of Current or Ex-Partner:      Emotionally Abused:      Physically Abused:      Sexually Abused:        ROS: 10 point ROS neg other than the symptoms noted above in the HPI.  EXAM  Constitutional: healthy, alert and no distress    Psychiatric: mentation appears normal and affect normal/bright    VASCULAR:  -Dorsalis pedis pulse +2/4 b/l  -Posterior tibial pulse +2/4 b/l  -Capillary refill time < 3 seconds to b/l hallux  NEURO:  -Light touch sensation intact to b/l plantar forefoot  DERM:  -Skin temperature, texture and turgor WNL b/l  -Mild hyperkeratotic lesion to the distal plantar tip of the LEFT 2nd digit    MSK:  -Tenderness on palpation on the RIGHT proximal fifth metatarsal base along the proximal lateral aspect of the styloid process and along the peroneal tendon immediately posterior to the lateral malleolus    -LEFT 1st MTPJ has mild bony prominence on the dorsal and  medial aspect of the 1st metatarsal head  ---ROM limited to < 20 degrees DORSIFLEXION with loading the forefot    -Mild PLANTARFLEXION of the DIPJ of the LEFT foot DIPJ    -Muscle strength of ankles +5/5 for dorsiflexion, plantarflexion, ABDUction and ADDuction b/l    -Ankle joint passive ROM within normal limits except for dorsiflexion:    Dorsiflexion, RIGHT Straight knee 0 degrees    Dorsiflexion, LEFT Straight knee 0 degrees    -Moderate decrease in arch height while patient is NWB    RIGHT FOOT RADIOGRAPH 07/08/2021  IMPRESSION: 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  ESVIN SOTO MD     RIGHT FOOT RADOGRAPHS 07/20/2023  IMPRESSION: No acute fracture. Old ununited fracture at the base of  the fifth metatarsal.       ESVIN SOTO MD   ============================================================    ASSESSMENT:  (M76.71) Peroneal tendinitis of right lower extremity  (primary encounter diagnosis)    (M79.671) Right foot pain      PLAN:  -Patient evaluated and examined. Treatment options discussed with no educational barriers noted.    Peroneal tendonitis:  -Discussed peroneal tendon pain including potential etiologies and treatment options. Patient's pain was likely started due to a combination of factors that can include but are not limited to worn or improper shoe gear, trauma, overuse, sudden change in shoe gear, change in activity, imbalanced biomechanics (such as pes cavus, pes planus, gastroc equinus deformity of the calf muscles). The patient has a history of a fifth metatarsal base fracture which did not heal. It has been asymptomatic for years. The fracture site can become painful again if the fracture site shifts, but there is no obvious evidence of a change in her radiographs from prior radiographs in 2021. Her pain also extends along the course of the peroneal tendon which is placing pressure on her fifth metatarsal base.    -Dispensed a mild valgus heel tilt for the patient  to place in the posterior lateral aspect of the the liner of the patient's shoe. She may try this for several weeks to decrease strain on the peroneal tendon. The tilt is mild and can be increased temporarily if needed.  -Patient may consider CMOs with a mild valgus tilt. She previously tried orthotics through [facility] (~September, 2022) and it made her foot pain worse, but this was to treat LEFT 1st MTPJ pain. Will consider another OTC orthotic with a mild tilt if pain persists.   -If pain suddenly worsens, a CAM boot may cassie to be considered.      -Stability Shoe Gear: This involves wearing a solid tennis shoe that bends at the toe, but has a solid midfoot portion of the shoe that does not bend or twist in half, and a rigid heel contour.   -----Jack, Asics, and New Balance are a few brands that have several types of stability tennis shoes. However, these brands also carry lightweight shoes that do not meet the above criteria, so look for a stability tennis shoe.  -----Jack tend to have a wider toe box if you have difficulty finding wide enough shoes for your feet.   -----Any brand of can be worn as long as it meets the above three criteria.  -Patient is wearing a thin, flat, loafer style of shoe. She is advised to wear a rigid, tennis shoe style of shoe until pain lessens.    -Compression socks: Advised to wear compression socks all day (especially when working) to decrease LOWER EXTREMITY swelling in both feet and legs. She may also consider an ankle compression sleeve if she does not want to wear compression socks outside with the warm weather.    -Icing: Ice the painful area of the foot/ankle once a day for ten minutes per foot. Ice after any extended amount of time on feet.    -Consider supportive sandals for around the house (such as Dunellen, Vionics, Birkenstocks, Keens, Merrells, or Oofos sandals). Consider supportive sandals if refusing to wear rigid tennis shoes.    -Will consider PT to strengthen the  tendon and / or a short period of offloading with a CAM boot if pain worsens.    -This is an acute, uncomplicated illness/injury with OTC treatment options reviewed.    -Patient in agreement with the above treatment plan and all of patient's questions were answered.      Return to clinic one month for RIGHT lateral peroneal tendinitis pain        Nya Bui DPM

## 2023-08-08 ENCOUNTER — MYC MEDICAL ADVICE (OUTPATIENT)
Dept: FAMILY MEDICINE | Facility: OTHER | Age: 53
End: 2023-08-08

## 2023-08-22 ENCOUNTER — OFFICE VISIT (OUTPATIENT)
Dept: PODIATRY | Facility: OTHER | Age: 53
End: 2023-08-22
Attending: PODIATRIST
Payer: COMMERCIAL

## 2023-08-22 ENCOUNTER — ANCILLARY PROCEDURE (OUTPATIENT)
Dept: GENERAL RADIOLOGY | Facility: OTHER | Age: 53
End: 2023-08-22
Attending: PODIATRIST
Payer: COMMERCIAL

## 2023-08-22 VITALS
OXYGEN SATURATION: 96 % | TEMPERATURE: 97.7 F | DIASTOLIC BLOOD PRESSURE: 84 MMHG | SYSTOLIC BLOOD PRESSURE: 124 MMHG | HEART RATE: 80 BPM

## 2023-08-22 DIAGNOSIS — M77.42 METATARSALGIA OF LEFT FOOT: ICD-10-CM

## 2023-08-22 DIAGNOSIS — M79.675 PAIN IN LEFT TOE(S): ICD-10-CM

## 2023-08-22 DIAGNOSIS — M76.71 PERONEAL TENDINITIS OF RIGHT LOWER EXTREMITY: ICD-10-CM

## 2023-08-22 DIAGNOSIS — M79.675 PAIN IN LEFT TOE(S): Primary | ICD-10-CM

## 2023-08-22 PROCEDURE — 73630 X-RAY EXAM OF FOOT: CPT | Mod: TC | Performed by: RADIOLOGY

## 2023-08-22 PROCEDURE — 99213 OFFICE O/P EST LOW 20 MIN: CPT | Performed by: PODIATRIST

## 2023-08-22 ASSESSMENT — PAIN SCALES - GENERAL: PAINLEVEL: MILD PAIN (2)

## 2023-08-22 NOTE — PROGRESS NOTES
Chief complaint: Patient presents with:  Musculoskeletal Problem: Right foot pain      History of Present Illness: This 53 year old female is seen for pain of the RIGHT foot peroneal tendon pain. She wore a compression sleeve for a day and it resolved her pain. She also had a tilt added to to her orthotic. She also discontinued this after the pain resolved.    She is not wearing compression socks. She has worn a cloud stepper sandal through the summer. She has no pain when wearing sandals on the lateral RIGHT ankle.    -----------------------------------------    As a new injury, she thinks she broke her LEFT second toe. She jammed her toe into a lip of a slide. This occurred ten days ago and the toe is still painful. She rosalba splinted her toe but it made the pain worse.    She also has had increased LEFT metatarsal pain in the past week. Historically, she had a RIGHT fifth metatarsal fracture that she didn't know she had. She wants to check to make sure she also didn't fracture her fifth metatarsal base.    No further pedal complaints today.       Patient does not use tobacco products.       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 mutation (H)    S/P abdominoplasty    Irritable bowel syndrome with diarrhea    Iron deficiency    H/O deep venous thrombosis    Cervicalgia    Deviated nasal septum    Fibrocystic breast changes    Ulcer of esophagus    Biliary dyskinesia    Diverticulitis    Colon cancer screening    Prolapsed internal hemorrhoids    Migraine    Other specified postprocedural states    Pain of foot    Arthralgia of hip    Personal history of other venous thrombosis and embolism    Shoulder pain    Other specified diseases of gallbladder    Abdominal pain        Past Surgical History:   Procedure Laterality Date    ABDOMINOPLASTY       SECTION      x2    COLONOSCOPY - HIM SCAN  2011    Colonoscopy 2011    COLONOSCOPY - HIM SCAN  2018    Procedure:  Colonoscopy diagnostic with polypectomy and biopsies;  Surgeon:  Danelle Swift MD;  Location:  [facility] OR    COLONOSCOPY - HIM SCAN  2007    Colonoscopy, flex, diagnostic    DILATE CERVIX, HYSTEROSCOPY, ABLATE ENDOMETRIUM, COMBINED N/A 2016    Procedure: COMBINED DILATE CERVIX, HYSTEROSCOPY, ABLATE ENDOMETRIUM;  Surgeon: Pacheco Curry MD;  Location: [facility] OR    ESOPHAGOGASTRODUODENOSCOPY      negative    ESOPHAGOSCOPY, GASTROSCOPY, DUODENOSCOPY (EGD), COMBINED N/A 2020    Procedure: Upper Endoscopy with biopsy;  Surgeon: Son Cooper MD;  Location: [facility] OR    ORTHOPEDIC SURGERY Left     rotator cuff repair and bicep tendon repair    ZZC BREAST AUGMENTATION      ZZC LAP CHOLECYSTOSOMY         Current Outpatient Medications   Medication    bimatoprost (LATISSE) 0.03 % external opthalmic solution    biotin 1000 MCG TABS tablet    cholecalciferol (VITAMIN D3) 25 mcg (1000 units) capsule    cyanocobalamin (VITAMIN B-12) 500 MCG SUBL sublingual tablet    fluticasone (FLONASE) 50 MCG/ACT nasal spray    omeprazole (PRILOSEC) 40 MG DR capsule    budesonide (PULMICORT) 0.5 MG/2ML neb solution     No current facility-administered medications for this visit.          Allergies   Allergen Reactions    Amoxicillin-Pot Clavulanate Diarrhea     C diff    Ciprofloxacin Unknown     Tingling in feet    Doxycycline Unknown    Flagyl [Metronidazole] Other (See Comments)    Food      Mangoes cause rash    Sulfamethoxazole-Trimethoprim Rash       History reviewed. No pertinent family history.    Social History     Socioeconomic History    Marital status:      Spouse name: None    Number of children: None    Years of education: None    Highest education level: None   Occupational History     None   Tobacco Use    Smoking status: Former Smoker     Years: 3.00     Types: Cigarettes     Quit date:      Years since quittin.5    Smokeless tobacco: Never Used   Substance and Sexual Activity    Alcohol use: Yes     Alcohol/week: 0.0 standard drinks     Comment: Occassional    Drug use: No    Sexual activity: None   Other Topics Concern    Parent/sibling w/ CABG, MI or angioplasty before 65F 55M? Not Asked   Social History Narrative    None     Social Determinants of Health     Financial Resource Strain:     Difficulty of Paying Living Expenses:    Food Insecurity:     Worried About Running Out of Food in the Last Year:     Ran Out of Food in the Last Year:    Transportation Needs:     Lack of Transportation (Medical):     Lack of Transportation (Non-Medical):    Physical Activity:     Days of Exercise per Week:     Minutes of Exercise per Session:    Stress:     Feeling of Stress :    Social Connections:     Frequency of Communication with Friends and Family:     Frequency of Social Gatherings with Friends and Family:     Attends Tenriism Services:     Active Member of Clubs or Organizations:     Attends Club or Organization Meetings:     Marital Status:    Intimate Partner Violence:     Fear of Current or Ex-Partner:     Emotionally Abused:     Physically Abused:     Sexually Abused:        ROS: 10 point ROS neg other than the symptoms noted above in the HPI.  EXAM  Constitutional: healthy, alert and no distress    Psychiatric: mentation appears normal and affect normal/bright    VASCULAR:  -Dorsalis pedis pulse +2/4 b/l  -Posterior tibial pulse +2/4 b/l  -Capillary refill time < 3 seconds to b/l hallux  NEURO:  -Light touch sensation intact to b/l plantar forefoot  DERM:  -Skin temperature, texture and turgor WNL b/l    MSK:  -No further pain on palpation on the RIGHT proximal fifth metatarsal base along the proximal lateral aspect of the styloid process and along the peroneal tendon immediately  posterior to the lateral malleolus  -Tenderness on palpation to the dorsal and plantar mid proximal phalanx of the LEFT second toe  -Mild tenderness along the [id number] shaft of the fifth metatarsal    -LEFT 1st MTPJ has mild bony prominence on the dorsal and medial aspect of the 1st metatarsal head  ---ROM limited to < 20 degrees DORSIFLEXION with loading the forefot    -Mild PLANTARFLEXION of the DIPJ of the LEFT foot DIPJ    -Muscle strength of ankles +5/5 for dorsiflexion, plantarflexion, ABDUction and ADDuction b/l    -Ankle joint passive ROM within normal limits except for dorsiflexion:    Dorsiflexion, RIGHT Straight knee 0 degrees    Dorsiflexion, LEFT Straight knee 0 degrees    -Moderate decrease in arch height while patient is NWB    RIGHT FOOT RADIOGRAPH 07/08/2021  IMPRESSION: 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  ESVIN SOTO MD     RIGHT FOOT RADOGRAPHS 07/20/2023  IMPRESSION: No acute fracture. Old ununited fracture at the base of  the fifth metatarsal.       ESVIN SOTO MD   LEFT FOOT RADIOGRAPHS 08/22/2023  IMPRESSION: No acute fracture.    DARELL CHOWDARY MD   ============================================================    ASSESSMENT:  (M79.675) Pain in left toe(s)  (primary encounter diagnosis)    (M77.42) Metatarsalgia of left foot    (M76.71) Peroneal tendinitis of right lower extremity      PLAN:  -Patient evaluated and examined. Treatment options discussed with no educational barriers noted.    Peroneal tendonitis of RIGHT  foot/ankle:  -Resolved. Patient advised to continue wearing her supportive shoes and orthotics.      ------------------------------------    LEFT  second toe pain:   -LEFT  foot radiographs ordered on 08/22/2023: No concerning fracture noted.  -Patient's pain has been lingering in the LEFT second toe since she jammed the toe. She may still have a bone or soft tissue contusion. Pain from the second toe is likely causing compensation  and increasing pain to the fifth metatarsal. Until her second toe pain reduces, she is advised to treat the toe like a toe fracture:    -Lesser toe fractures:   ---Patient may consider budding splinting the toe to a neighbor toe for comfort. This caused her increased pain, so she may try the other toe or three toes together.  ---Solid, stable shoes are recommended and avoid barefoot or sock walking or flip-flop sandals for six weeks to minimize pain and to allow for better healing  ---Patient may develop arthritic changes to the joint and may be more prone to develop a hammertoe / mallet toe due to the location of the fracture. The patient may or may not have pain from this, but if pain does develop due to arthritic changes, the patient is encouraged to call the clinic for an appointment to address the pain.  ---A toe sleeve may provide some comfort in closed shoes. Remove the toe sleeve at night and hand wash it with soap and water between uses.    -Call the clinic if pain worsens.    -This is an acute, uncomplicated illness/injury with OTC treatment options reviewed.    -Patient in agreement with the above treatment plan and all of patient's questions were answered.      Return to clinic one month for RIGHT lateral peroneal tendinitis pain        Nya Bui DPM

## 2023-09-25 ENCOUNTER — OFFICE VISIT (OUTPATIENT)
Dept: PODIATRY | Facility: OTHER | Age: 53
End: 2023-09-25
Attending: PODIATRIST
Payer: COMMERCIAL

## 2023-09-25 VITALS
SYSTOLIC BLOOD PRESSURE: 132 MMHG | BODY MASS INDEX: 28.17 KG/M2 | HEIGHT: 64 IN | HEART RATE: 72 BPM | WEIGHT: 165 LBS | OXYGEN SATURATION: 97 % | DIASTOLIC BLOOD PRESSURE: 84 MMHG | TEMPERATURE: 98 F

## 2023-09-25 DIAGNOSIS — M79.675 GREAT TOE PAIN, LEFT: ICD-10-CM

## 2023-09-25 DIAGNOSIS — M77.41 METATARSALGIA OF RIGHT FOOT: ICD-10-CM

## 2023-09-25 DIAGNOSIS — M20.12 HALLUX VALGUS (ACQUIRED), LEFT FOOT: Primary | ICD-10-CM

## 2023-09-25 PROCEDURE — 99213 OFFICE O/P EST LOW 20 MIN: CPT | Mod: 25 | Performed by: PODIATRIST

## 2023-09-25 PROCEDURE — 20600 DRAIN/INJ JOINT/BURSA W/O US: CPT | Mod: LT | Performed by: PODIATRIST

## 2023-09-25 RX ORDER — DEXAMETHASONE SODIUM PHOSPHATE 10 MG/ML
5 INJECTION INTRAMUSCULAR; INTRAVENOUS ONCE
Status: COMPLETED | OUTPATIENT
Start: 2023-09-25 | End: 2023-09-25

## 2023-09-25 RX ADMIN — DEXAMETHASONE SODIUM PHOSPHATE 5 MG: 10 INJECTION INTRAMUSCULAR; INTRAVENOUS at 10:10

## 2023-09-25 ASSESSMENT — PAIN SCALES - GENERAL: PAINLEVEL: MILD PAIN (3)

## 2023-09-25 NOTE — PROGRESS NOTES
"Chief complaint: Patient presents with:  Musculoskeletal Problem: Left foot pain      History of Present Illness: This 53 year old female is seen for pain of the RIGHT foot peroneal tendon pain.     She was wearing compression and she wore a tilt on her RIGHT foot shoe and the pain went away. The pain in the RIGHT peroneal tendon is still resolved.    Her LEFT second toe is still irritated and it is catching on things, but it is improved in pain.    Her RIGHT fifth metatarsal still gives her discomfort when she is on her feet a lot. She saw someone at [employer] but it didn't help her foot pain. She is wondering how to relieve this pain.    Lastly, her LEFT great toe joint has increased in pain this last month. She had a steroid injection in the LEFT 1st MTPJ around , and it resolved her pain. She would like another injection today.      No further pedal complaints today.       Patient does not use tobacco products.       /84 (BP Location: Left arm, Cuff Size: Adult Regular)   Pulse 72   Temp 98  F (36.7  C) (Tympanic)   Ht 1.626 m (5' 4\")   Wt 74.8 kg (165 lb)   LMP 2016 (LMP Unknown)   SpO2 97%   BMI 28.32 kg/m      Patient Active Problem List   Diagnosis    Menorrhagia    Sinusitis, chronic    Prothrombin [id number] mutation (H)    S/P abdominoplasty    Irritable bowel syndrome with diarrhea    Iron deficiency    H/O deep venous thrombosis    Cervicalgia    Deviated nasal septum    Fibrocystic breast changes    Ulcer of esophagus    Biliary dyskinesia    Diverticulitis    Colon cancer screening    Prolapsed internal hemorrhoids    Migraine    Other specified postprocedural states    Pain of foot    Arthralgia of hip    Personal history of other venous thrombosis and embolism    Shoulder pain    Other specified diseases of gallbladder    Abdominal pain       Past Surgical History:   Procedure Laterality Date    ABDOMINOPLASTY       SECTION      x2    COLONOSCOPY - HIM SCAN  " 2011    Colonoscopy 2011    COLONOSCOPY - HIM SCAN  2018    Procedure:  Colonoscopy diagnostic with polypectomy and biopsies;  Surgeon:  Danelle Swift MD;  Location:  [facility] OR    COLONOSCOPY - HIM SCAN  2007    Colonoscopy, flex, diagnostic    DILATE CERVIX, HYSTEROSCOPY, ABLATE ENDOMETRIUM, COMBINED N/A 2016    Procedure: COMBINED DILATE CERVIX, HYSTEROSCOPY, ABLATE ENDOMETRIUM;  Surgeon: Pacheco Curry MD;  Location: [facility] OR    ESOPHAGOGASTRODUODENOSCOPY      negative    ESOPHAGOSCOPY, GASTROSCOPY, DUODENOSCOPY (EGD), COMBINED N/A 2020    Procedure: Upper Endoscopy with biopsy;  Surgeon: Son Cooper MD;  Location: [facility] OR    ORTHOPEDIC SURGERY Left     rotator cuff repair and bicep tendon repair    ZZC BREAST AUGMENTATION      ZZC LAP CHOLECYSTOSOMY         Current Outpatient Medications   Medication    bimatoprost (LATISSE) 0.03 % external opthalmic solution    biotin 1000 MCG TABS tablet    cholecalciferol (VITAMIN D3) 25 mcg (1000 units) capsule    cyanocobalamin (VITAMIN B-12) 500 MCG SUBL sublingual tablet    fluticasone (FLONASE) 50 MCG/ACT nasal spray    omeprazole (PRILOSEC) 40 MG DR capsule     No current facility-administered medications for this visit.          Allergies   Allergen Reactions    Amoxicillin-Pot Clavulanate Diarrhea     C diff    Ciprofloxacin Unknown     Tingling in feet    Doxycycline Unknown    Flagyl [Metronidazole] Other (See Comments)    Food      Mangoes cause rash    Sulfamethoxazole-Trimethoprim Rash       History reviewed. No pertinent family history.    Social History     Socioeconomic History    Marital status:      Spouse name: None    Number of children: None    Years of education: None    Highest education level: None   Occupational History    None   Tobacco Use    Smoking status: Former Smoker     Years: 3.00     Types: Cigarettes     Quit date:      Years since quittin.5    Smokeless tobacco: Never Used   Substance  and Sexual Activity    Alcohol use: Yes     Alcohol/week: 0.0 standard drinks     Comment: Occassional    Drug use: No    Sexual activity: None   Other Topics Concern    Parent/sibling w/ CABG, MI or angioplasty before 65F 55M? Not Asked   Social History Narrative    None     Social Determinants of Health     Financial Resource Strain:     Difficulty of Paying Living Expenses:    Food Insecurity:     Worried About Running Out of Food in the Last Year:     Ran Out of Food in the Last Year:    Transportation Needs:     Lack of Transportation (Medical):     Lack of Transportation (Non-Medical):    Physical Activity:     Days of Exercise per Week:     Minutes of Exercise per Session:    Stress:     Feeling of Stress :    Social Connections:     Frequency of Communication with Friends and Family:     Frequency of Social Gatherings with Friends and Family:     Attends Sikhism Services:     Active Member of Clubs or Organizations:     Attends Club or Organization Meetings:     Marital Status:    Intimate Partner Violence:     Fear of Current or Ex-Partner:     Emotionally Abused:     Physically Abused:     Sexually Abused:        ROS: 10 point ROS neg other than the symptoms noted above in the HPI.  EXAM  Constitutional: healthy, alert and no distress    Psychiatric: mentation appears normal and affect normal/bright    VASCULAR:  -Dorsalis pedis pulse +2/4 b/l  -Posterior tibial pulse +2/4 b/l  -Capillary refill time < 3 seconds to b/l hallux  NEURO:  -Light touch sensation intact to b/l plantar forefoot  DERM:  -Skin temperature, texture and turgor WNL b/l    MSK:  -Mild tenderness on palpation on the RIGHT proximal fifth metatarsal base along the proximal 1/4  on the plantar fifth metatarsal shaft    -Pain on palpation to the LEFT dorsal 1st MTPJ    -LEFT 1st MTPJ has mild bony prominence on the dorsal and medial aspect of the 1st metatarsal head  ---ROM limited to < 20 degrees DORSIFLEXION with loading the  forefot    -Mild PLANTARFLEXION of the DIPJ of the LEFT foot DIPJ    -Muscle strength of ankles +5/5 for dorsiflexion, plantarflexion, ABDUction and ADDuction b/l    -Ankle joint passive ROM within normal limits except for dorsiflexion:    Dorsiflexion, RIGHT Straight knee 0 degrees    Dorsiflexion, LEFT Straight knee 0 degrees    -Moderate decrease in arch height while patient is NWB    RIGHT FOOT RADIOGRAPH 07/08/2021  IMPRESSION: 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  ESVIN SOTO MD     RIGHT FOOT RADOGRAPHS 07/20/2023  IMPRESSION: No acute fracture. Old ununited fracture at the base of  the fifth metatarsal.       ESVIN SOTO MD   LEFT FOOT RADIOGRAPHS 08/22/2023  IMPRESSION: No acute fracture.    DARELL CHOWDARY MD   ============================================================    ASSESSMENT:  (M20.12) Hallux valgus (acquired), left foot  (primary encounter diagnosis)    (M79.675) Great toe pain, left    (M77.41) Metatarsalgia of right foot      PLAN:  -Patient evaluated and examined. Treatment options discussed with no educational barriers noted.    RIGHT foot peroneal tendinitis: Resolved    Metatarsalgia:  -Discussed potential causes and treatment options for metatarsalgia. Pain along the metatarsals can be caused from a number of factors, but is commonly caused by an imbalance of the biomechanics. This can include but is not limited to a flat foot, high arched foot, tendon imbalance, gastrocnemius equinus, and compensation for pain in other areas of the foot. In this case, patient has tenderness along the RIGHT proximal fifth metatarsal (proximal 1/4 of the bone).   ---Conservative treatment options consist of wider shoe gear and orthotics +/- padding/splinting to correct the biomechanics of the foot. Consistently wearing supportive shoe gear can also decrease this pain (stability shoe gear involves wearing a stability shoe that bends at the toe, but has a solid midfoot  shank and heel contour).   ---Patient understands the treatment options. At this time, the patient wishes to pursue CMOs that offload the RIGHT proximal fifth metatarsal.  -Orthotic referral through the orthotist, Danielle Frank for CMOs with offloading for the RIGHT proximal fifth metatarsal or a stability OTC insert for additional support. Patient was educated on slowly transitioning into the inserts. The patient may call the orthotist to make adjustments if the inserts are not comfortable after consistently wearing them fro 4-6 weeks.     -This is an acute, uncomplicated illness/injury with OTC treatment options reviewed.    ----------------------------------------------------------    LEFT great toe pain in the 1st MTPJ:  -Patient has had moderate pain relief from previous injections in the 1st MTPJ. She has a first ray elevatus based on her previous radiographs. Will add a reverse hernandez's extension to the CMOs to allow a more even distribution of weight across the forefoot.    -Steroid injection x 1 to the LEFT dorsal 1st MTPJ:   -Obtained written and verbal consent from patient for a steroid injection into the above location(s). Reviewed risks and benefits of the injection. Informed patient that the injection may decrease pain long-term, short-term, or not at all. A steroid injection can potentially weaken the surrounding structures of the injected site such as weaken the insertion of nearby tendons and cartilage or thin cartilage. The goal of the injection, however, is to reduce the inflammation to stop the chronic inflammatory cycle. The injection in combination with other treatment options may help reduce pain. Some people develop an increased flare of pain within a few days of the injection which usually resolves. Patient understands risks and benefits of the injection and is in agreement with an injection today in an effort to slow or reverse the chronic inflammatory process and pain in the foot.    ---Patient signed informed consent and a time-out was performed and there was verification of correct patient, procedure and laterality.  ---Prepped the injection site with an alcohol swab.  ---Injected a total of 5mg Dexamethasone (0.5mL total of a 10mg/1mL bottle) and 0.5mL of 2% Lidocaine plain into the LEFT dorsal 1st MTPJ. Patient tolerated the injection(s) well.    -Patient in agreement with the above treatment plan and all of patient's questions were answered.      Return to clinic as needed        Nya Bui DPM

## 2023-11-05 ENCOUNTER — HEALTH MAINTENANCE LETTER (OUTPATIENT)
Age: 53
End: 2023-11-05

## 2023-11-06 ENCOUNTER — MYC MEDICAL ADVICE (OUTPATIENT)
Dept: FAMILY MEDICINE | Facility: OTHER | Age: 53
End: 2023-11-06

## 2023-11-21 ENCOUNTER — TELEPHONE (OUTPATIENT)
Dept: OTOLARYNGOLOGY | Facility: OTHER | Age: 53
End: 2023-11-21

## 2023-11-28 ENCOUNTER — TRANSFERRED RECORDS (OUTPATIENT)
Dept: HEALTH INFORMATION MANAGEMENT | Facility: CLINIC | Age: 53
End: 2023-11-28

## 2023-12-04 ENCOUNTER — APPOINTMENT (OUTPATIENT)
Dept: CT IMAGING | Facility: HOSPITAL | Age: 53
End: 2023-12-04
Attending: NURSE PRACTITIONER
Payer: COMMERCIAL

## 2023-12-04 ENCOUNTER — HOSPITAL ENCOUNTER (EMERGENCY)
Facility: HOSPITAL | Age: 53
Discharge: HOME | End: 2023-12-04
Attending: NURSE PRACTITIONER | Admitting: NURSE PRACTITIONER
Payer: COMMERCIAL

## 2023-12-04 VITALS
BODY MASS INDEX: 29.78 KG/M2 | DIASTOLIC BLOOD PRESSURE: 86 MMHG | HEART RATE: 85 BPM | WEIGHT: 174.4 LBS | OXYGEN SATURATION: 99 % | TEMPERATURE: 99.6 F | HEIGHT: 64 IN | SYSTOLIC BLOOD PRESSURE: 157 MMHG | RESPIRATION RATE: 16 BRPM

## 2023-12-04 DIAGNOSIS — K57.92 DIVERTICULITIS: ICD-10-CM

## 2023-12-04 LAB
ALBUMIN SERPL BCG-MCNC: 4.5 G/DL (ref 3.5–5.2)
ALBUMIN UR-MCNC: NEGATIVE MG/DL
ALP SERPL-CCNC: 96 U/L (ref 40–150)
ALT SERPL W P-5'-P-CCNC: 13 U/L (ref 0–50)
ANION GAP SERPL CALCULATED.3IONS-SCNC: 11 MMOL/L (ref 7–15)
APPEARANCE UR: CLEAR
AST SERPL W P-5'-P-CCNC: 18 U/L (ref 0–45)
BASOPHILS # BLD AUTO: 0.1 10E3/UL (ref 0–0.2)
BASOPHILS NFR BLD AUTO: 0 %
BILIRUB SERPL-MCNC: 0.8 MG/DL
BILIRUB UR QL STRIP: NEGATIVE
BUN SERPL-MCNC: 10.3 MG/DL (ref 6–20)
CALCIUM SERPL-MCNC: 10 MG/DL (ref 8.6–10)
CHLORIDE SERPL-SCNC: 99 MMOL/L (ref 98–107)
COLOR UR AUTO: NORMAL
CREAT SERPL-MCNC: 0.73 MG/DL (ref 0.51–0.95)
DEPRECATED HCO3 PLAS-SCNC: 27 MMOL/L (ref 22–29)
EGFRCR SERPLBLD CKD-EPI 2021: >90 ML/MIN/1.73M2
EOSINOPHIL # BLD AUTO: 0.2 10E3/UL (ref 0–0.7)
EOSINOPHIL NFR BLD AUTO: 1 %
ERYTHROCYTE [DISTWIDTH] IN BLOOD BY AUTOMATED COUNT: 12.5 % (ref 10–15)
GLUCOSE SERPL-MCNC: 114 MG/DL (ref 70–99)
GLUCOSE UR STRIP-MCNC: NEGATIVE MG/DL
HCT VFR BLD AUTO: 45.3 % (ref 35–47)
HGB BLD-MCNC: 15.3 G/DL (ref 11.7–15.7)
HGB UR QL STRIP: NEGATIVE
IMM GRANULOCYTES # BLD: 0.1 10E3/UL
IMM GRANULOCYTES NFR BLD: 0 %
KETONES UR STRIP-MCNC: NEGATIVE MG/DL
LEUKOCYTE ESTERASE UR QL STRIP: NEGATIVE
LIPASE SERPL-CCNC: 29 U/L (ref 13–60)
LYMPHOCYTES # BLD AUTO: 2 10E3/UL (ref 0.8–5.3)
LYMPHOCYTES NFR BLD AUTO: 12 %
MCH RBC QN AUTO: 29.6 PG (ref 26.5–33)
MCHC RBC AUTO-ENTMCNC: 33.8 G/DL (ref 31.5–36.5)
MCV RBC AUTO: 88 FL (ref 78–100)
MONOCYTES # BLD AUTO: 1.2 10E3/UL (ref 0–1.3)
MONOCYTES NFR BLD AUTO: 7 %
NEUTROPHILS # BLD AUTO: 13.2 10E3/UL (ref 1.6–8.3)
NEUTROPHILS NFR BLD AUTO: 80 %
NITRATE UR QL: NEGATIVE
NRBC # BLD AUTO: 0 10E3/UL
NRBC BLD AUTO-RTO: 0 /100
PH UR STRIP: 6 [PH] (ref 4.7–8)
PLATELET # BLD AUTO: 375 10E3/UL (ref 150–450)
POTASSIUM SERPL-SCNC: 4.1 MMOL/L (ref 3.4–5.3)
PROT SERPL-MCNC: 7.3 G/DL (ref 6.4–8.3)
RBC # BLD AUTO: 5.17 10E6/UL (ref 3.8–5.2)
RBC URINE: 1 /HPF
SODIUM SERPL-SCNC: 137 MMOL/L (ref 135–145)
SP GR UR STRIP: 1 (ref 1–1.03)
SQUAMOUS EPITHELIAL: 0 /HPF
UROBILINOGEN UR STRIP-MCNC: NORMAL MG/DL
WBC # BLD AUTO: 16.7 10E3/UL (ref 4–11)
WBC URINE: <1 /HPF

## 2023-12-04 PROCEDURE — 80053 COMPREHEN METABOLIC PANEL: CPT | Performed by: NURSE PRACTITIONER

## 2023-12-04 PROCEDURE — 250N000011 HC RX IP 250 OP 636: Performed by: NURSE PRACTITIONER

## 2023-12-04 PROCEDURE — G0463 HOSPITAL OUTPT CLINIC VISIT: HCPCS

## 2023-12-04 PROCEDURE — 81001 URINALYSIS AUTO W/SCOPE: CPT | Performed by: NURSE PRACTITIONER

## 2023-12-04 PROCEDURE — 99213 OFFICE O/P EST LOW 20 MIN: CPT | Performed by: NURSE PRACTITIONER

## 2023-12-04 PROCEDURE — 36415 COLL VENOUS BLD VENIPUNCTURE: CPT | Performed by: NURSE PRACTITIONER

## 2023-12-04 PROCEDURE — 83690 ASSAY OF LIPASE: CPT | Performed by: NURSE PRACTITIONER

## 2023-12-04 PROCEDURE — 74177 CT ABD & PELVIS W/CONTRAST: CPT

## 2023-12-04 PROCEDURE — 85025 COMPLETE CBC W/AUTO DIFF WBC: CPT | Performed by: NURSE PRACTITIONER

## 2023-12-04 RX ORDER — IOPAMIDOL 755 MG/ML
85 INJECTION, SOLUTION INTRAVASCULAR ONCE
Status: COMPLETED | OUTPATIENT
Start: 2023-12-04 | End: 2023-12-04

## 2023-12-04 RX ADMIN — IOPAMIDOL 85 ML: 755 INJECTION, SOLUTION INTRAVENOUS at 16:44

## 2023-12-04 ASSESSMENT — ENCOUNTER SYMPTOMS
CONSTIPATION: 1
FLANK PAIN: 1
HEADACHES: 0
NAUSEA: 0
CHILLS: 1
ABDOMINAL PAIN: 1
SHORTNESS OF BREATH: 0
VOMITING: 0
FEVER: 0
DIARRHEA: 0
ACTIVITY CHANGE: 1

## 2023-12-04 ASSESSMENT — ACTIVITIES OF DAILY LIVING (ADL)
ADLS_ACUITY_SCORE: 35
ADLS_ACUITY_SCORE: 35

## 2023-12-04 NOTE — DISCHARGE INSTRUCTIONS
Clear liquid diet advance as tolerated according to pain. Tylenol and warm or cold packs for pain.  Urine to ER/urgent care for worsening of symptoms including fevers and abdominal pain    What to expect when you have contrast    During your exam, we will inject  contras.t  into your vein or artery. (Contrast is a clear liquid with iodine in it. It shows up on X-rays.)    You may feel warm or hot. You may have a metal taste in your mouth and a slight upset stomach. You may also feel pressure near the kidneys and bladder. These effects will last about 1 to 3 minutes.    Please tell us if you have:   Sneezing    Itching   Hives    Swelling in the face   A hoarse voice   Breathing problems   Other new symptoms    Serious problems are rare.  They may include:   Irregular heartbeat    Seizures   Kidney failure             Tissue damage   Shock     Death    If you have any problems during the exam, we  will treat them right away.    When you get home    Call your hospital if you have any new symptoms in the next 2 days, like hives or swelling. (Phone numbers are at the bottom of this page.) Or call your family doctor.     If you have wheezing or trouble breathing, call 911.    Self-care  -Drink at least 4 extra glasses of water today.   This reduces the stress on your kidneys.  -Keep taking your regular medicines.    The contrast will pass out of your body in your  Urine(pee). This will happen in the next 24 hours. You  will not feel this. Your urine will not  change color.    If you have kidney problems or take metformin    Drink 4 to 8 large glasses of water for the next  2 days, if you are not on a fluid restriction.    ?If you take metformin (Glucophage or Glucovance) for diabetes, keep taking it.      ?Your kidney function tests are abnormal.  If you take Metformin, do not take it for 48 hours. Please go to your clinic for a blood test within 3 days after your exam before the restarting this medicine.     (Note to  provider:please give patient prescription for lab tests.)    ?Special instructions: -    I have read and understand the above information.    Patient Sign Here:______________________________________Date:________Time:______    Staff Sign Here:________________________________________Date:_______Time:______      Radiology Departments:     ?[facility]: [phone] ?[place]: [phone]     ?[place]: [phone] ?[facility]:[phone]      ?[place]: [phone]  ?[place]: [phone]  ?[place]:[phone]    ?[facility]:[phone]  ?[facility]:[phone]

## 2023-12-04 NOTE — ED NOTES
TANYA Moncada CNP assessed patient in triage and determined patient Urgent Care appropriate. Will be seen in Urgent Care.

## 2023-12-04 NOTE — ED PROVIDER NOTES
History     Chief Complaint   Patient presents with    Abdominal Pain     HPI  Veronica Ansari is a 53 year old female who stopped her omeprazole 3 weeks ago and was started on Nexium by [facility].  Started Nexium 6 days ago and is decreasing her heartburn.. Has developed left abdominal and pelvic discomfort for 3 weeks.  History of diverticulitis; states feels the same as previous episodes.  Has taken stool softeners without relief of her discomfort.  Had a normal BM today.  Had second COVID vaccination April, 2021.  Otherwise, no recent vaccines.  Non-smoker.  Denies fevers, diarrhea, nausea, vomiting, chest pain, headaches, and shortness of breath.    Abdominal Pain    Duration:  three weeks ago  Description (location/character/radiation): Left abdomen and above pelvic        Associated flank pain: left flank pain  Intensity:  2/10 7/10 with movement  Accompanying signs and symptoms:        Fever/Chills: YES- chills       Gas/Bloating: YES- little flatus       Nausea/vomitting: no        Diarrhea: no        Dysuria or Hematuria: no   History (previous similar pain/trauma/previous testing): diverticulitis  Precipitating or alleviating factors:       Pain worse with eating/BM/urination: worse after eating       Pain relieved by BM: YES  Therapies tried and outcome: stool softerners  LMP:    menopause     Allergies:  Allergies   Allergen Reactions    Amoxicillin-Pot Clavulanate Diarrhea     C diff    Ciprofloxacin Unknown     Tingling in feet    Doxycycline Unknown    Flagyl [Metronidazole] Other (See Comments)    Food      Mangoes cause rash    Sulfamethoxazole-Trimethoprim Rash       Problem List:    Patient Active Problem List    Diagnosis Date Noted    Abdominal pain 12/08/2021     Priority: Medium    Colon cancer screening 06/08/2021     Priority: Medium     Formatting of this note might be different from the original.  Added automatically from request for surgery [id number]      Prolapsed internal hemorrhoids  06/08/2021     Priority: Medium     Formatting of this note might be different from the original.  Added automatically from request for surgery [id number]    Formatting of this note might be different from the original.  Formatting of this note might be different from the original.  Added automatically from request for surgery [id number]  Formatting of this note might be different from the original.  Formatting of this note might be different from the original.  Added automatically from request for surgery [id number]      Diverticulitis 05/05/2021     Priority: Medium     Added automatically from request for surgery [id number]      Ulcer of esophagus 05/21/2020     Priority: Medium     Added automatically from request for surgery [id number]    Formatting of this note might be different from the original.  Formatting of this note might be different from the original.  Added automatically from request for surgery [id number]  Formatting of this note might be different from the original.  Added automatically from request for surgery [id number]      Biliary dyskinesia 11/14/2018     Priority: Medium    Other specified diseases of gallbladder 11/14/2018     Priority: Medium    S/P abdominoplasty 04/02/2018     Priority: Medium     Overview:   complicated by right calf DVT      Other specified postprocedural states 04/02/2018     Priority: Medium     Formatting of this note might be different from the original.  Formatting of this note might be different from the original.  complicated by right calf DVT  Formatting of this note might be different from the original.  Overview:   complicated by right calf DVT      Irritable bowel syndrome with diarrhea 11/14/2017     Priority: Medium    Menorrhagia 04/19/2016     Priority: Medium    Pain of foot 04/02/2015     Priority: Medium     Last Assessment & Plan:   Formatting of this note might be different from the original.   Chronic arthritis. Receives injections as needed with PCP. 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  IMO Update      Arthralgia of hip 2011     Priority: Medium    H/O deep venous thrombosis 03/10/2007     Priority: Medium     Overview:   Post-operative LE DVT      Personal history of other venous thrombosis and embolism 03/10/2007     Priority: Medium     Formatting of this note might be different from the original.  Formatting of this note might be different from the original.  Post-operative LE DVT  Formatting of this note might be different from the original.  Overview:   Post-operative LE DVT    Last Assessment & Plan:   Formatting of this note might be different from the original.  Post surgery was on anticoagulant afterwards. Off anticoagulants since       Fibrocystic breast changes 2006     Priority: Medium    Sinusitis, chronic 2000     Priority: Medium     Overview:   Chronic  IMO Update 10/11      Deviated nasal septum 2000     Priority: Medium        Past Medical History:    Past Medical History:   Diagnosis Date    Cervicalgia     Deviated nasal septum     Embolism and thrombosis (H)     Fibrocystic breast disease     Migraine     Prothrombin mutation (H24)     Sinusitis        Past Surgical History:    Past Surgical History:   Procedure Laterality Date    ABDOMINOPLASTY       SECTION      x2    COLONOSCOPY - HIM SCAN  2011    Colonoscopy 2011    COLONOSCOPY - HIM SCAN  2018    Procedure:  Colonoscopy diagnostic with polypectomy and biopsies;  Surgeon:  Danelle Swift MD;  Location:  [facility] OR    COLONOSCOPY - HIM SCAN  2007    Colonoscopy, flex, diagnostic  "   DILATE CERVIX, HYSTEROSCOPY, ABLATE ENDOMETRIUM, COMBINED N/A 2016    Procedure: COMBINED DILATE CERVIX, HYSTEROSCOPY, ABLATE ENDOMETRIUM;  Surgeon: Pacheco Curry MD;  Location: [facility] OR    ESOPHAGOGASTRODUODENOSCOPY      negative    ESOPHAGOSCOPY, GASTROSCOPY, DUODENOSCOPY (EGD), COMBINED N/A 2020    Procedure: Upper Endoscopy with biopsy;  Surgeon: Son Cooper MD;  Location: [facility] OR    ORTHOPEDIC SURGERY Left     rotator cuff repair and bicep tendon repair    ZZC BREAST AUGMENTATION      ZZC LAP CHOLECYSTOSOMY         Family History:    History reviewed. No pertinent family history.    Social History:  Marital Status:   [2]  Social History     Tobacco Use    Smoking status: Former     Years: 3     Types: Cigarettes     Quit date:      Years since quittin.9    Smokeless tobacco: Never   Substance Use Topics    Alcohol use: Yes     Comment: weekly    Drug use: No        Medications:    bimatoprost (LATISSE) 0.03 % external opthalmic solution  biotin 1000 MCG TABS tablet  cholecalciferol (VITAMIN D3) 25 mcg (1000 units) capsule  cyanocobalamin (VITAMIN B-12) 500 MCG SUBL sublingual tablet  fluticasone (FLONASE) 50 MCG/ACT nasal spray  omeprazole (PRILOSEC) 40 MG DR capsule          Review of Systems   Constitutional:  Positive for activity change and chills. Negative for fever.   Respiratory:  Negative for shortness of breath.    Cardiovascular:  Negative for chest pain.   Gastrointestinal:  Positive for abdominal pain and constipation. Negative for diarrhea, nausea and vomiting.        Last BM today took stool softener last night.  Normal BM today    Genitourinary:  Positive for flank pain (Left).   Neurological:  Negative for headaches.       Physical Exam   BP: 157/86  Pulse: 85  Temp: 99.6  F (37.6  C)  Resp: 16  Height: 162.6 cm (5' 4\")  Weight: 79.1 kg (174 lb 6.4 oz)  SpO2: 99 %      Physical Exam  Vitals and nursing note reviewed.   Constitutional:       General: She is in " acute distress (mild).      Appearance: She is overweight.   Cardiovascular:      Rate and Rhythm: Normal rate and regular rhythm.      Heart sounds: Normal heart sounds. No murmur heard.  Pulmonary:      Effort: Pulmonary effort is normal. No respiratory distress.      Breath sounds: Normal breath sounds. No wheezing or rales.   Abdominal:      General: Abdomen is flat. Bowel sounds are normal. There is no distension.      Palpations: Abdomen is soft. There is no hepatomegaly or splenomegaly.      Tenderness: There is abdominal tenderness (Mild) in the suprapubic area and left lower quadrant. There is no right CVA tenderness, left CVA tenderness or guarding.   Skin:     General: Skin is warm and dry.   Neurological:      Mental Status: She is alert and oriented to person, place, and time.   Psychiatric:         Behavior: Behavior normal.         ED Course                 Procedures             Results for orders placed or performed during the hospital encounter of 12/04/23 (from the past 24 hour(s))   UA with Microscopic reflex to Culture    Specimen: Urine, Midstream   Result Value Ref Range    Color Urine Straw Colorless, Straw, Light Yellow, Yellow    Appearance Urine Clear Clear    Glucose Urine Negative Negative mg/dL    Bilirubin Urine Negative Negative    Ketones Urine Negative Negative mg/dL    Specific Gravity Urine 1.004 1.003 - 1.035    Blood Urine Negative Negative    pH Urine 6.0 4.7 - 8.0    Protein Albumin Urine Negative Negative mg/dL    Urobilinogen Urine Normal Normal, 2.0 mg/dL    Nitrite Urine Negative Negative    Leukocyte Esterase Urine Negative Negative    RBC Urine 1 <=2 /HPF    WBC Urine <1 <=5 /HPF    Squamous Epithelials Urine 0 <=1 /HPF    Narrative    Urine Culture not indicated   Comprehensive metabolic panel   Result Value Ref Range    Sodium 137 135 - 145 mmol/L    Potassium 4.1 3.4 - 5.3 mmol/L    Carbon Dioxide (CO2) 27 22 - 29 mmol/L    Anion Gap 11 7 - 15 mmol/L    Urea Nitrogen  10.3 6.0 - 20.0 mg/dL    Creatinine 0.73 0.51 - 0.95 mg/dL    GFR Estimate >90 >60 mL/min/1.73m2    Calcium 10.0 8.6 - 10.0 mg/dL    Chloride 99 98 - 107 mmol/L    Glucose 114 (H) 70 - 99 mg/dL    Alkaline Phosphatase 96 40 - 150 U/L    AST 18 0 - 45 U/L    ALT 13 0 - 50 U/L    Protein Total 7.3 6.4 - 8.3 g/dL    Albumin 4.5 3.5 - 5.2 g/dL    Bilirubin Total 0.8 <=1.2 mg/dL   CBC with platelets differential    Narrative    The following orders were created for panel order CBC with platelets differential.  Procedure                               Abnormality         Status                     ---------                               -----------         ------                     CBC with platelets and d...[[id number]]  Abnormal            Final result                 Please view results for these tests on the individual orders.   Lipase   Result Value Ref Range    Lipase 29 13 - 60 U/L   CBC with platelets and differential   Result Value Ref Range    WBC Count 16.7 (H) 4.0 - 11.0 10e3/uL    RBC Count 5.17 3.80 - 5.20 10e6/uL    Hemoglobin 15.3 11.7 - 15.7 g/dL    Hematocrit 45.3 35.0 - 47.0 %    MCV 88 78 - 100 fL    MCH 29.6 26.5 - 33.0 pg    MCHC 33.8 31.5 - 36.5 g/dL    RDW 12.5 10.0 - 15.0 %    Platelet Count 375 150 - 450 10e3/uL    % Neutrophils 80 %    % Lymphocytes 12 %    % Monocytes 7 %    % Eosinophils 1 %    % Basophils 0 %    % Immature Granulocytes 0 %    NRBCs per 100 WBC 0 <1 /100    Absolute Neutrophils 13.2 (H) 1.6 - 8.3 10e3/uL    Absolute Lymphocytes 2.0 0.8 - 5.3 10e3/uL    Absolute Monocytes 1.2 0.0 - 1.3 10e3/uL    Absolute Eosinophils 0.2 0.0 - 0.7 10e3/uL    Absolute Basophils 0.1 0.0 - 0.2 10e3/uL    Absolute Immature Granulocytes 0.1 <=0.4 10e3/uL    Absolute NRBCs 0.0 10e3/uL   CT Abdomen Pelvis w Contrast    Narrative    Exam: CT ABDOMEN PELVIS W CONTRAST    Exam reason: R/O DIVERTICULITS, abdominal pain    Technique: Using helical CT technique, axial images of the abdomen and  pelvis were  obtained with IV contrast.  Coronal and sagittal  reconstructions also performed. This CT was performed using one or  more of the following dose reduction techniques: automated exposure  control, adjustment of the mA and/or kV according to patient size,  and/or use of iterative reconstruction technique.    Meds/Contrast: isovue 370 85mL    Comparison: 1/31/2023     FINDINGS:    ABDOMEN:    Liver: No mass or any significant abnormality.  Gallbladder: Resected.   Bile Ducts: No biliary ductal dilation.   Spleen:  No splenomegaly or focal lesion.  Pancreas: No mass, ductal dilatation, or inflammatory changes.  Kidneys: No solid mass, hydronephrosis, or any definite calculi.   Adrenals:  No nodules.   Lymph Nodes: No adenopathy.   Vascular: No aortic aneurysm.   Abdominal Wall: No hernia, fluid collection, or any significant  findings.     PELVIS:   No mass or adenopathy.     Bowel/Mesentery/Peritoneum:   -No bowel obstruction.   -Normal appendix.  -There is colonic wall thickening and a couple of inflamed diverticuli  with surrounding pericolonic fat stranding in the mid descending  colon, compatible with diverticulitis. No findings to suggest  perforation. No abscess.  -No free air or free fluid.    Visualized portions of the Chest: No consolidation or pleural  effusion.  Musculoskeletal: No acute osseous abnormalities. There is a  right-sided pars interarticularis defect at L5-S1.       Impression    IMPRESSION:  Descending diverticulitis without perforation or findings to suggest  abscess.    DOMINIC JOSUE MD         SYSTEM ID:  [id number]       Medications   iopamidol (ISOVUE-370) solution 85 mL (85 mLs Intravenous $Given 12/4/23 1644)   sodium chloride (PF) 0.9% PF flush 60 mL (50 mLs Intravenous $Given 12/4/23 1644)       Assessments & Plan (with Medical Decision Making)     I have reviewed the nursing notes.    I have reviewed the findings, diagnosis, plan and need for follow up with the patient.  (K57.92)  Diverticulitis  Comment: 53 year old female who stopped her omeprazole 3 weeks ago and was started on Nexium by [facility].  Started Nexium 6 days ago and is decreasing her heartburn.. Has developed left abdominal and pelvic discomfort for 3 weeks.  History of diverticulitis; states feels the same as previous episodes.  Has taken stool softeners without relief of her discomfort.  Had a normal BM today.  Had second COVID vaccination April, 2021.  Otherwise, no recent vaccines.  Non-smoker.  Denies fevers, diarrhea, nausea, vomiting, chest pain, headaches, and shortness of breath.    MDM:NHT. Lungs CTA  Mild left-sided abdominal discomfort with palpation    Urinalysis negative  CBC has elevated WBC 16.7; and absolute neutrophils 13.2  CMP and lipase negative    CT of abdomen pelvis per radiology:Descending diverticulitis without perforation or findings to suggest abscess.    Has multiple side effects from antibiotics and has developed C. difficile she believes from Augmentin.  Per up-to-date diverticulitis can be treated with clear liquid diet and pain control.  She prefers to take acetaminophen for pain control.  Has requested surgical referral.    Plan:Clear liquid diet advance as tolerated according to pain. Tylenol and warm or cold packs for pain.  Urine to ER/urgent care for worsening of symptoms including fevers and abdominal pain  Return for worsening of symptoms.   Adult General Surg Referral placed        These discharge instructions and medications were reviewed with her and understanding verbalized.    This document was prepared using a combination of typing and voice generated software.  While every attempt was made for accuracy, spelling and grammatical errors may exist.    Discharge Medication List as of 12/4/2023  5:38 PM          Final diagnoses:   Diverticulitis       12/4/2023   [facility]         Michell Escobedo, CNP  12/04/23 [id number]

## 2023-12-04 NOTE — ED TRIAGE NOTES
Patient presents to Urgent Care for abdominal pain since this morning. Patient has history of Diverticulitis. Patient thinks that is what it is.

## 2023-12-07 NOTE — PROGRESS NOTES
[facility] [place]  [address]  [place] [address]  Phone: [phone]  Primary Provider: Jenelle Ruiz  Pre-op Performing Provider: JENELLE RUIZ      PREOPERATIVE EVALUATION:  Today's date: 12/11/2023    Veronica is a 53 year old, presenting for the following:  Pre-Op Exam    Surgical Information:  Surgery/Procedure: Liposuction - abdomen  Surgery Location: [facility]  Surgeon: Dr Curtis   Surgery Date: 1/04/2024  Time of Surgery: TBD  Where patient plans to recover: At home with family  Fax number for surgical facility: [phone]    Assessment & Plan     The proposed surgical procedure is considered INTERMEDIATE risk.    Preop general physical exam  Proceed as planned  - CBC with platelets and differential; Future    H/O cosmetic surgery  Prior abdominoplasty.  Recent cool sculpting.  Planning liposuction to address poor results of cool sculpting.    Hand arthritis  No deformities on exam.  No contractures or triggers.  Topical agents not helpful.  Obtain xrays.  Consider ortho consult for injections.  - XR Hand Right G/E 3 Views; Future    Diverticulitis of colon  Symptoms improved.  Tolerating oral intake.  Updated CBC today with normal WBC.   Resolved.    Gastroesophageal reflux disease, unspecified whether esophagitis present  Continue PPI - managed by GI at [place].           Risks and Recommendations:  The patient has the following additional risks and recommendations for perioperative complications:   - No identified additional risk factors other than previously addressed    Antiplatelet or Anticoagulation Medication Instructions:   - Patient is on no antiplatelet or anticoagulation medications.    Additional Medication Instructions:  Patient is to take all scheduled medications on the day of surgery EXCEPT for modifications listed below:   - Herbal medications and vitamins: HOLD 14 days prior to surgery.    RECOMMENDATION:  APPROVAL GIVEN to proceed with proposed  procedure, without further diagnostic evaluation.      Subjective       HPI related to upcoming procedure: Patient planning cosmetic surgery - liposuction to abdomen.  Prior cool sculpting - caused scarring.  Not painful.           12/11/2023     8:29 AM   Preop Questions   1. Have you ever had a heart attack or stroke? No   2. Have you ever had surgery on your heart or blood vessels, such as a stent placement, a coronary artery bypass, or surgery on an artery in your head, neck, heart, or legs? No   3. Do you have chest pain with activity? No   4. Do you have a history of  heart failure? No   5. Do you currently have a cold, bronchitis or symptoms of other infection? UNKNOWN - not currently but had diverticulitis attack last week; urgent care 12/4/23; clear diet   6. Do you have a cough, shortness of breath, or wheezing? No   7. Do you or anyone in your family have previous history of blood clots? YES - self and dad; right calf DVT post operatively   8. Do you or does anyone in your family have a serious bleeding problem such as prolonged bleeding following surgeries or cuts? No   9. Have you ever had problems with anemia or been told to take iron pills? YES - has had low iron in the past but not currently on anything at this time    10. Have you had any abnormal blood loss such as black, tarry or bloody stools, or abnormal vaginal bleeding? No   11. Have you ever had a blood transfusion? No   12. Are you willing to have a blood transfusion if it is medically needed before, during, or after your surgery? Yes   13. Have you or any of your relatives ever had problems with anesthesia? No   14. Do you have sleep apnea, excessive snoring or daytime drowsiness? No   15. Do you have any artifical heart valves or other implanted medical devices like a pacemaker, defibrillator, or continuous glucose monitor? No   16. Do you have artificial joints? No   17. Are you allergic to latex? No   18. Is there any chance that you may  be pregnant? No     Health Care Directive:  Patient does not have a Health Care Directive or Living Will: Discussed advance care planning with patient; information given to patient to review.    Preoperative Review of :   reviewed - no record of controlled substances prescribed.      Status of Chronic Conditions:  See problem list for active medical problems.  Problems all longstanding and stable, except as noted/documented.  See ROS for pertinent symptoms related to these conditions.    GERD - on Nexium; was [place] recently - changed PPI; has follow up visit later this month    Prothrombin Mutation H24, history of clot - prior DVT right calf post op; did hematology consult; did not advise prophylaxis.  Heterozygote.     Vaccines - flu, covid, hep b - agreeable to flu and hepatitis B -     Hand arthritis - bilateral - right worse; is right handed; right index stiff in AM; 4th and 5th as well;  Prior injections right thumb - Dr Kumar.  Worse with overuse.  Sleeps ok.  Tried Voltaren.    Diverticulitis - did have recent CT in ER/UC - sigmoid - liquid diet; no antibiotics.  12/4/23.  WBC 16.7.  Repeating today.  CMP was normal 12/4/23.      Review of Systems  Constitutional, neuro, ENT, endocrine, pulmonary, cardiac, gastrointestinal, genitourinary, musculoskeletal, integument and psychiatric systems are negative, except as otherwise noted.    Patient Active Problem List    Diagnosis Date Noted    Abdominal pain 12/08/2021     Priority: Medium    Colon cancer screening 06/08/2021     Priority: Medium     Formatting of this note might be different from the original.  Added automatically from request for surgery [id number]      Prolapsed internal hemorrhoids 06/08/2021     Priority: Medium     Formatting of this note might be different from the original.  Added automatically from request for surgery [id number]    Formatting of this note might be different from the original.  Formatting of this note might be different  from the original.  Added automatically from request for surgery [id number]  Formatting of this note might be different from the original.  Formatting of this note might be different from the original.  Added automatically from request for surgery [id number]      Diverticulitis 05/05/2021     Priority: Medium     Added automatically from request for surgery [id number]      Ulcer of esophagus 05/21/2020     Priority: Medium     Added automatically from request for surgery [id number]    Formatting of this note might be different from the original.  Formatting of this note might be different from the original.  Added automatically from request for surgery [id number]  Formatting of this note might be different from the original.  Added automatically from request for surgery [id number]      Biliary dyskinesia 11/14/2018     Priority: Medium    Other specified diseases of gallbladder 11/14/2018     Priority: Medium    S/P abdominoplasty 04/02/2018     Priority: Medium     Overview:   complicated by right calf DVT      Other specified postprocedural states 04/02/2018     Priority: Medium     Formatting of this note might be different from the original.  Formatting of this note might be different from the original.  complicated by right calf DVT  Formatting of this note might be different from the original.  Overview:   complicated by right calf DVT      Irritable bowel syndrome with diarrhea 11/14/2017     Priority: Medium    Menorrhagia 04/19/2016     Priority: Medium    Pain of foot 04/02/2015     Priority: Medium     Last Assessment & Plan:   Formatting of this note might be different from the original.   Chronic arthritis. Receives injections as needed with PCP. 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  Formatting of this note might be different from the original.  Post-operative LE DVT  Formatting of this note might be different from the original.  Overview:   Post-operative LE DVT    Last Assessment & Plan:   Formatting of this note might be different from the original.  Post surgery was on anticoagulant afterwards. Off anticoagulants since       Fibrocystic breast changes 2006     Priority: Medium    Sinusitis, chronic 2000     Priority: Medium     Overview:   Chronic  IMO Update 10/11      Deviated nasal septum 2000     Priority: Medium      Past Medical History:   Diagnosis Date    Cervicalgia     Deviated nasal septum     Embolism and thrombosis (H)     unspecified site    Fibrocystic breast disease     Migraine     Prothrombin mutation (H24)     Sinusitis      Past Surgical History:   Procedure Laterality Date    ABDOMINOPLASTY       SECTION      x2    COLONOSCOPY - HIM SCAN  2011    Colonoscopy 2011    COLONOSCOPY - HIM SCAN  2018    Procedure:  Colonoscopy diagnostic with polypectomy and biopsies;  Surgeon:  Danelle Swift MD;  [facility]:  [facility] OR    COLONOSCOPY - HIM SCAN  2007    Colonoscopy, flex, diagnostic    DILATE CERVIX, HYSTEROSCOPY, ABLATE ENDOMETRIUM, COMBINED N/A 2016    Procedure: COMBINED DILATE CERVIX, HYSTEROSCOPY, ABLATE ENDOMETRIUM;  Surgeon: Pacheco Curry MD;  Location: [facility] OR    ESOPHAGOGASTRODUODENOSCOPY      negative    ESOPHAGOSCOPY, GASTROSCOPY, DUODENOSCOPY (EGD), COMBINED N/A 2020    Procedure: Upper Endoscopy with biopsy;  Surgeon: Son Cooper MD;  Location: [facility] OR     "ORTHOPEDIC SURGERY Left     rotator cuff repair and bicep tendon repair    ZZC BREAST AUGMENTATION      ZZC LAP CHOLECYSTOSOMY       Current Outpatient Medications   Medication Sig Dispense Refill    bimatoprost (LATISSE) 0.03 % external opthalmic solution Apply 1 drop topically At Bedtime 5 mL 3    biotin 1000 MCG TABS tablet Take 1,000 mcg by mouth daily      cholecalciferol (VITAMIN D3) 25 mcg (1000 units) capsule Take 1 capsule by mouth daily      cyanocobalamin (VITAMIN B-12) 500 MCG SUBL sublingual tablet Place 500 mcg under the tongue daily      esomeprazole (NEXIUM) 20 MG DR capsule Take 20 mg by mouth every morning (before breakfast) Take 30-60 minutes before eating.      fluticasone (FLONASE) 50 MCG/ACT nasal spray SHAKE LIQUID AND USE 2 SPRAYS IN EACH NOSTRIL DAILY 16 g 0       Allergies   Allergen Reactions    Amoxicillin-Pot Clavulanate Diarrhea     C diff    Ciprofloxacin Unknown     Tingling in feet    Doxycycline Unknown    Flagyl [Metronidazole] Other (See Comments)    Food      Mangoes cause rash    Sulfamethoxazole-Trimethoprim Rash        Social History     Tobacco Use    Smoking status: Former     Years: 3     Types: Cigarettes     Quit date:      Years since quittin.9    Smokeless tobacco: Never   Substance Use Topics    Alcohol use: Yes     Comment: weekly     No family history on file.  History   Drug Use No         Objective     /70 (BP Location: Right arm, Patient Position: Sitting, Cuff Size: Adult Regular)   Pulse 90   Temp 97.4  F (36.3  C) (Tympanic)   Ht 1.626 m (5' 4\")   Wt 76.2 kg (168 lb 1.6 oz)   LMP 2016 (LMP Unknown)   SpO2 98%   BMI 28.85 kg/m      Physical Exam    GENERAL APPEARANCE: healthy, alert and no distress     EYES: EOMI, PERRL     HENT: ear canals and TM's normal and nose and mouth without ulcers or lesions     NECK: no adenopathy, no asymmetry, masses, or scars and thyroid normal to palpation     RESP: lungs clear to auscultation - no " rales, rhonchi or wheezes     CV: regular rates and rhythm, normal S1 S2, no S3 or S4 and no murmur, click or rub     ABDOMEN:  soft, nontender, no HSM or masses and bowel sounds normal     MS: extremities normal- no gross deformities noted, no evidence of inflammation in joints, FROM in all extremities.  Hands without deformities; no trigger/locking.     SKIN: no suspicious lesions or rashes     NEURO: Normal strength and tone, sensory exam grossly normal, mentation intact and speech normal     PSYCH: mentation appears normal. and affect normal/bright     LYMPHATICS: No cervical adenopathy    Recent Labs   Lab Test 12/04/23  1554 01/31/23  1528   HGB 15.3 14.6    361    140   POTASSIUM 4.1 3.7   CR 0.73 0.64        Diagnostics:  Recent Results (from the past 24 hour(s))   CBC with platelets and differential    Collection Time: 12/11/23  9:25 AM   Result Value Ref [facility]    WBC Count 7.5 4.0 - 11.0 10e3/uL    RBC Count 4.76 3.80 - 5.20 10e6/uL    Hemoglobin 14.2 11.7 - 15.7 g/dL    Hematocrit 42.5 35.0 - 47.0 %    MCV 89 78 - 100 fL    MCH 29.8 26.5 - 33.0 pg    MCHC 33.4 31.5 - 36.5 g/dL    RDW 12.3 10.0 - 15.0 %    Platelet Count 403 150 - 450 10e3/uL    % Neutrophils 61 %    % Lymphocytes 25 %    % Monocytes 9 %    % Eosinophils 3 %    % Basophils 1 %    % Immature Granulocytes 1 %    NRBCs per 100 WBC 0 <1 /100    Absolute Neutrophils 4.6 1.6 - 8.3 10e3/uL    Absolute Lymphocytes 1.9 0.8 - 5.3 10e3/uL    Absolute Monocytes 0.7 0.0 - 1.3 10e3/uL    Absolute Eosinophils 0.3 0.0 - 0.7 10e3/uL    Absolute Basophils 0.1 0.0 - 0.2 10e3/uL    Absolute Immature Granulocytes 0.0 <=0.4 10e3/uL    Absolute NRBCs 0.0 10e3/uL      No EKG required, no history of coronary heart disease, significant arrhythmia, peripheral arterial disease or other structural heart disease.    Revised Cardiac Risk Index (RCRI):  The patient has the following serious cardiovascular risks for perioperative complications:   - No serious  cardiac risks = 0 points     RCRI Interpretation: 0 points: Class I (very low risk - 0.4% complication rate)         Signed Electronically by: Jenelle Rivera MD  Copy of this evaluation report is provided to requesting physician.

## 2023-12-08 NOTE — PATIENT INSTRUCTIONS
Preparing for Your Surgery  Getting started  A nurse will call you to review your health history and instructions. They will give you an arrival time based on your scheduled surgery time. Please be ready to share:  Your doctor's clinic name and phone number  Your medical, surgical, and anesthesia history  A list of allergies and sensitivities  A list of medicines, including herbal treatments and over-the-counter drugs  Whether the patient has a legal guardian (ask how to send us the papers in advance)  Please tell us if you're pregnant--or if there's any chance you might be pregnant. Some surgeries may injure a fetus (unborn baby), so they require a pregnancy test. Surgeries that are safe for a fetus don't always need a test, and you can choose whether to have one.   If you have a child who's having surgery, please ask for a copy of Preparing for Your Child's Surgery.    Preparing for surgery  Within 10 to 30 days of surgery: Have a pre-op exam (sometimes called an H&P, or History and Physical). This can be done at a clinic or pre-operative center.  If you're having a , you may not need this exam. Talk to your care team.  At your pre-op exam, talk to your care team about all medicines you take. If you need to stop any medicines before surgery, ask when to start taking them again.  We do this for your safety. Many medicines can make you bleed too much during surgery. Some change how well surgery (anesthesia) drugs work.  Call your insurance company to let them know you're having surgery. (If you don't have insurance, call [phone].)  Call your clinic if there's any change in your health. This includes signs of a cold or flu (sore throat, runny nose, cough, rash, fever). It also includes a scrape or scratch near the surgery site.  If you have questions on the day of surgery, call your hospital or surgery center.  Eating and drinking guidelines  For your safety: Unless your surgeon tells you otherwise,  follow the guidelines below.  Eat and drink as usual until 8 hours before you arrive for surgery. After that, no food or milk.  Drink clear liquids until 2 hours before you arrive. These are liquids you can see through, like water, Gatorade, and Propel Water. They also include plain black coffee and tea (no cream or milk), candy, and breath mints. You can spit out gum when you arrive.  If you drink alcohol: Stop drinking it the night before surgery.  If your care team tells you to take medicine on the morning of surgery, it's okay to take it with a sip of water.  Preventing infection  Shower or bathe the night before and morning of your surgery. Follow the instructions your clinic gave you. (If no instructions, use regular soap.)  Don't shave or clip hair near your surgery site. We'll remove the hair if needed.  Don't smoke or vape the morning of surgery. You may chew nicotine gum up to 2 hours before surgery. A nicotine patch is okay.  Note: Some surgeries require you to completely quit smoking and nicotine. Check with your surgeon.  Your care team will make every effort to keep you safe from infection. We will:  Clean our hands often with soap and water (or an alcohol-based hand rub).  Clean the skin at your surgery site with a special soap that kills germs.  Give you a special gown to keep you warm. (Cold raises the risk of infection.)  Wear special hair covers, masks, gowns and gloves during surgery.  Give antibiotic medicine, if prescribed. Not all surgeries need antibiotics.  What to bring on the day of surgery  Photo ID and insurance card  Copy of your health care directive, if you have one  Glasses and hearing aids (bring cases)  You can't wear contacts during surgery  Inhaler and eye drops, if you use them (tell us about these when you arrive)  CPAP machine or breathing device, if you use them  A few personal items, if spending the night  If you have . . .  A pacemaker, ICD (cardiac defibrillator) or other  implant: Bring the ID card.  An implanted stimulator: Bring the remote control.  A legal guardian: Bring a copy of the certified (court-stamped) guardianship papers.  Please remove any jewelry, including body piercings. Leave jewelry and other valuables at home.  If you're going home the day of surgery  You must have a responsible adult drive you home. They should stay with you overnight as well.  If you don't have someone to stay with you, and you aren't safe to go home alone, we may keep you overnight. Insurance often won't pay for this.  After surgery  If it's hard to control your pain or you need more pain medicine, please call your surgeon's office.  Questions?   If you have any questions for your care team, list them here: _________________________________________________________________________________________________________________________________________________________________________ ____________________________________ ____________________________________ ____________________________________  For informational purposes only. Not to replace the advice of your health care provider. Copyright   2003, 2019 [facility]. All rights reserved. Clinically reviewed by Libra Berman MD. [employer] 515984 - REV 12/22.    How to Take Your Medication Before Surgery  - Take all of your medications before surgery except as noted below  - HOLD (do not take) Aspirin for 7 days  - HOLD (do not take) Ibuprofen  - STOP taking all vitamins and herbal supplements 14 days before surgery.    Lab today - repeat blood counts.  Xray right hand.

## 2023-12-11 ENCOUNTER — OFFICE VISIT (OUTPATIENT)
Dept: FAMILY MEDICINE | Facility: OTHER | Age: 53
End: 2023-12-11
Attending: FAMILY MEDICINE
Payer: COMMERCIAL

## 2023-12-11 ENCOUNTER — ANCILLARY PROCEDURE (OUTPATIENT)
Dept: GENERAL RADIOLOGY | Facility: OTHER | Age: 53
End: 2023-12-11
Attending: FAMILY MEDICINE
Payer: COMMERCIAL

## 2023-12-11 VITALS
TEMPERATURE: 97.4 F | HEART RATE: 90 BPM | SYSTOLIC BLOOD PRESSURE: 120 MMHG | WEIGHT: 168.1 LBS | HEIGHT: 64 IN | BODY MASS INDEX: 28.7 KG/M2 | OXYGEN SATURATION: 98 % | DIASTOLIC BLOOD PRESSURE: 70 MMHG

## 2023-12-11 DIAGNOSIS — Z98.890 H/O COSMETIC SURGERY: ICD-10-CM

## 2023-12-11 DIAGNOSIS — M19.049 HAND ARTHRITIS: ICD-10-CM

## 2023-12-11 DIAGNOSIS — K57.32 DIVERTICULITIS OF COLON: ICD-10-CM

## 2023-12-11 DIAGNOSIS — Z01.818 PREOP GENERAL PHYSICAL EXAM: Primary | ICD-10-CM

## 2023-12-11 DIAGNOSIS — K21.9 GASTROESOPHAGEAL REFLUX DISEASE, UNSPECIFIED WHETHER ESOPHAGITIS PRESENT: ICD-10-CM

## 2023-12-11 LAB
BASOPHILS # BLD AUTO: 0.1 10E3/UL (ref 0–0.2)
BASOPHILS NFR BLD AUTO: 1 %
EOSINOPHIL # BLD AUTO: 0.3 10E3/UL (ref 0–0.7)
EOSINOPHIL NFR BLD AUTO: 3 %
ERYTHROCYTE [DISTWIDTH] IN BLOOD BY AUTOMATED COUNT: 12.3 % (ref 10–15)
HCT VFR BLD AUTO: 42.5 % (ref 35–47)
HGB BLD-MCNC: 14.2 G/DL (ref 11.7–15.7)
IMM GRANULOCYTES # BLD: 0 10E3/UL
IMM GRANULOCYTES NFR BLD: 1 %
LYMPHOCYTES # BLD AUTO: 1.9 10E3/UL (ref 0.8–5.3)
LYMPHOCYTES NFR BLD AUTO: 25 %
MCH RBC QN AUTO: 29.8 PG (ref 26.5–33)
MCHC RBC AUTO-ENTMCNC: 33.4 G/DL (ref 31.5–36.5)
MCV RBC AUTO: 89 FL (ref 78–100)
MONOCYTES # BLD AUTO: 0.7 10E3/UL (ref 0–1.3)
MONOCYTES NFR BLD AUTO: 9 %
NEUTROPHILS # BLD AUTO: 4.6 10E3/UL (ref 1.6–8.3)
NEUTROPHILS NFR BLD AUTO: 61 %
NRBC # BLD AUTO: 0 10E3/UL
NRBC BLD AUTO-RTO: 0 /100
PLATELET # BLD AUTO: 403 10E3/UL (ref 150–450)
RBC # BLD AUTO: 4.76 10E6/UL (ref 3.8–5.2)
WBC # BLD AUTO: 7.5 10E3/UL (ref 4–11)

## 2023-12-11 PROCEDURE — 90686 IIV4 VACC NO PRSV 0.5 ML IM: CPT | Performed by: FAMILY MEDICINE

## 2023-12-11 PROCEDURE — 85025 COMPLETE CBC W/AUTO DIFF WBC: CPT | Performed by: FAMILY MEDICINE

## 2023-12-11 PROCEDURE — 90471 IMMUNIZATION ADMIN: CPT | Performed by: FAMILY MEDICINE

## 2023-12-11 PROCEDURE — 36415 COLL VENOUS BLD VENIPUNCTURE: CPT | Performed by: FAMILY MEDICINE

## 2023-12-11 PROCEDURE — 90472 IMMUNIZATION ADMIN EACH ADD: CPT | Performed by: FAMILY MEDICINE

## 2023-12-11 PROCEDURE — 99214 OFFICE O/P EST MOD 30 MIN: CPT | Mod: 25 | Performed by: FAMILY MEDICINE

## 2023-12-11 PROCEDURE — 90746 HEPB VACCINE 3 DOSE ADULT IM: CPT | Performed by: FAMILY MEDICINE

## 2023-12-11 PROCEDURE — 73130 X-RAY EXAM OF HAND: CPT | Mod: TC | Performed by: RADIOLOGY

## 2023-12-11 RX ORDER — RABEPRAZOLE SODIUM 20 MG/1
20 TABLET, DELAYED RELEASE ORAL DAILY
COMMUNITY
Start: 2023-12-05 | End: 2023-12-11

## 2023-12-11 ASSESSMENT — PAIN SCALES - GENERAL: PAINLEVEL: NO PAIN (0)

## 2023-12-20 ENCOUNTER — TELEPHONE (OUTPATIENT)
Dept: FAMILY MEDICINE | Facility: OTHER | Age: 53
End: 2023-12-20

## 2023-12-20 ENCOUNTER — OFFICE VISIT (OUTPATIENT)
Dept: SURGERY | Facility: OTHER | Age: 53
End: 2023-12-20
Attending: SURGERY
Payer: COMMERCIAL

## 2023-12-20 VITALS
TEMPERATURE: 98.4 F | BODY MASS INDEX: 27.83 KG/M2 | OXYGEN SATURATION: 98 % | WEIGHT: 163 LBS | DIASTOLIC BLOOD PRESSURE: 78 MMHG | HEART RATE: 83 BPM | HEIGHT: 64 IN | SYSTOLIC BLOOD PRESSURE: 124 MMHG

## 2023-12-20 DIAGNOSIS — M19.041 ARTHRITIS OF RIGHT HAND: Primary | ICD-10-CM

## 2023-12-20 DIAGNOSIS — K57.92 DIVERTICULITIS: Primary | ICD-10-CM

## 2023-12-20 PROCEDURE — 99213 OFFICE O/P EST LOW 20 MIN: CPT | Performed by: SURGERY

## 2023-12-20 ASSESSMENT — PAIN SCALES - GENERAL: PAINLEVEL: NO PAIN (0)

## 2023-12-20 NOTE — TELEPHONE ENCOUNTER
Patient has an appt this afternoon with Dr. Morillo - she stated that she will just talk to him about it but appreciates the offer of the appointment.

## 2023-12-20 NOTE — TELEPHONE ENCOUNTER
No availiable openings today, are any of the providers willing to overbook? Please advise. Thank you.    Annie Ca, Health Unit Coordinator

## 2023-12-20 NOTE — TELEPHONE ENCOUNTER
Orthopedic referral placed.    Unable to see today - covering provider or UC/ER if needing to be evaluate for UTI.

## 2023-12-20 NOTE — TELEPHONE ENCOUNTER
Received message from patient, she would like a referral to orthopedic associate and has been pended.  Also, patient stated she thinks she has a UTI, would you like to see her today?

## 2023-12-20 NOTE — TELEPHONE ENCOUNTER
----- Message from Jenelle Ruiz MD sent at 12/17/2023  7:09 PM CST -----  Notify of un viewed result note info

## 2023-12-21 NOTE — PROGRESS NOTES
"[facility] Progress Note    HPI: Returns to clinic for evaluation of diverticulitis bout. She has had prior bouts in the past. Most recent bout was treated conservatively without antibiotics.       S: She is currently in no pain, she is eating and moving her bowels.     O:   Vitals:  /78 (BP Location: Left arm, Cuff Size: Adult Regular)   Pulse 83   Temp 98.4  F (36.9  C) (Tympanic)   Ht 1.626 m (5' 4\")   Wt 73.9 kg (163 lb)   LMP 04/11/2016 (LMP Unknown)   SpO2 98%   BMI 27.98 kg/m        Physical Exam:  G: alert oriented, no acute distress   No exam performed     Assessment/Plan:  Reviewed all of her imaging she appears to have diverticulosis throughout her colon. This most recent bout appears to be located more in the descending colon where her prior bouts were in the sigmoid colon. She did manage through her most recent flare without antibiotics and change in her diet and symptoms resolved. She does follow with GI at [facility]. She reports she has had colonoscopy within last three years though do not see record of this. She is going to have an upper endoscopy in the Spring for surveillance of her Beltran's I discussed with her that with a but of uncomplicated diverticulitis and with having a colon cancer screening in the last 3 years do not feel strongly about repeating her colonoscopy for this reason. She will discuss with her GI doc, I discussed would make most sense to do both her scopes at the same time, pending indication. I discussed could help her with this here if she would like, but to discuss with her GI doc. She is not interested in pursuing  surgery at this time.     Matthias Morillo MD     "

## 2023-12-28 ENCOUNTER — DOCUMENTATION ONLY (OUTPATIENT)
Dept: OTHER | Facility: CLINIC | Age: 53
End: 2023-12-28

## 2024-01-03 ENCOUNTER — MYC MEDICAL ADVICE (OUTPATIENT)
Dept: FAMILY MEDICINE | Facility: OTHER | Age: 54
End: 2024-01-03

## 2024-01-10 NOTE — DISCHARGE INSTRUCTIONS
You may trial magnesium citrate 1/2 bottle tonight and then 1/2 bottle in the morning to help with bowel movement.    You may also use Miralax 119 gram bottle mixed with liquid of choice.  Drink 1/2 the bottle and 4 hours later, drink the remaining half.     As we discussed, the right sided abdominal pain can happen with constipation.  However, this can also happen with any other abdominal process.  The most concerning being acute appendicitis.  We have mutually agreed to not obtain labs or CT scan.  Please watch for ANY worsening pain, pain that changes in quality, location, or character, or ANY fevers.  You should return HERE promptly for those symptoms or any other concerns.    normal mood with appropriate affect  , good eye contact, speech clear

## 2024-01-24 ENCOUNTER — TRANSFERRED RECORDS (OUTPATIENT)
Dept: HEALTH INFORMATION MANAGEMENT | Facility: HOSPITAL | Age: 54
End: 2024-01-24

## 2024-02-01 ENCOUNTER — OFFICE VISIT (OUTPATIENT)
Dept: FAMILY MEDICINE | Facility: OTHER | Age: 54
End: 2024-02-01
Attending: STUDENT IN AN ORGANIZED HEALTH CARE EDUCATION/TRAINING PROGRAM
Payer: COMMERCIAL

## 2024-02-01 ENCOUNTER — MYC MEDICAL ADVICE (OUTPATIENT)
Dept: FAMILY MEDICINE | Facility: OTHER | Age: 54
End: 2024-02-01

## 2024-02-01 VITALS
TEMPERATURE: 99.9 F | HEIGHT: 64 IN | HEART RATE: 87 BPM | BODY MASS INDEX: 29.28 KG/M2 | DIASTOLIC BLOOD PRESSURE: 80 MMHG | WEIGHT: 171.5 LBS | OXYGEN SATURATION: 97 % | SYSTOLIC BLOOD PRESSURE: 123 MMHG

## 2024-02-01 DIAGNOSIS — J06.9 VIRAL URI WITH COUGH: Primary | ICD-10-CM

## 2024-02-01 DIAGNOSIS — R05.1 ACUTE COUGH: ICD-10-CM

## 2024-02-01 PROCEDURE — 87637 SARSCOV2&INF A&B&RSV AMP PRB: CPT | Performed by: STUDENT IN AN ORGANIZED HEALTH CARE EDUCATION/TRAINING PROGRAM

## 2024-02-01 PROCEDURE — 99213 OFFICE O/P EST LOW 20 MIN: CPT | Performed by: STUDENT IN AN ORGANIZED HEALTH CARE EDUCATION/TRAINING PROGRAM

## 2024-02-01 ASSESSMENT — PAIN SCALES - GENERAL: PAINLEVEL: NO PAIN (0)

## 2024-02-01 NOTE — PROGRESS NOTES
"  Assessment & Plan     Viral URI with cough  Clinical history and physical exam consistent with viral URI.    Viral respiratory panel pending.  Given duration of symptoms, she is out of the window for tamiflu and paxlovid.  She overall well appearing and her exam is unremarkable.    Her centor criteria is very low making strep throat unlikely.  Testing not indicated today  Continue with supportive cares at home. Rest, warm fluids, Cepacol lozenges for sore throat, delsym for cough  Anticipatory guidance provided.    Acute cough  Pending.    - Symptomatic Influenza A/B, RSV, & SARS-CoV2 PCR (COVID-19) Nose; Future  - Symptomatic Influenza A/B, RSV, & SARS-CoV2 PCR (COVID-19) Nose      BMI  Estimated body mass index is 29.44 kg/m  as calculated from the following:    Height as of this encounter: 1.626 m (5' 4\").    Weight as of this encounter: 77.8 kg (171 lb 8 oz).    Romeo Martin is a 53 year old, presenting for the following health issues:  Cough, Sinus Problem, and Headache        2/1/2024     1:30 PM   Additional Questions   Roomed by Xuan Fay LPN, CCP, MHP   Accompanied by self     HPI     Took some mucinex this morning.      Acute Illness  Acute illness concerns: cough  Onset/Duration: Saturday, started with HA  Symptoms:  Fever: YES  - 99.9 here at clinic, never took at home  Chills/Sweats: Slightly chilled  Headache (location?): YES - right side, but mostly went away  Sinus Pressure: YES - very mildly  Conjunctivitis:  No  Ear Pain: no  Rhinorrhea: No  Congestion: No  Sore Throat: No  Cough: YES-productive of yellow sputum, productive of green sputum, with shortness of breath, barking when coughing - using large amounts of kleenex  Wheeze: No  Decreased Appetite: No  Nausea: No  Vomiting: No  Diarrhea: No  Dysuria/Freq.: No  Dysuria or Hematuria: No  Fatigue/Achiness: YES  Sick/Strep Exposure: YES- kids, one had fever 104F  Therapies tried and outcome: mucinex cough/chest congestion, nyquil " "helped one night, tylenol helped with HA    COVID at home test on Tuesday was negative  Has hx of yearly sinus issues but stopped taking antibiotics due to C. Diff         Review of Systems  Constitutional, HEENT, cardiovascular, pulmonary, GI, , musculoskeletal, neuro, skin, endocrine and psych systems are negative, except as otherwise noted.      Objective    /80 (BP Location: Right arm, Patient Position: Sitting, Cuff Size: Adult Regular)   Pulse 87   Temp 99.9  F (37.7  C) (Tympanic)   Ht 1.626 m (5' 4\")   Wt 77.8 kg (171 lb 8 oz)   LMP 04/11/2016 (LMP Unknown)   SpO2 97%   BMI 29.44 kg/m    Body mass index is 29.44 kg/m .    Physical Exam   GENERAL: alert and no distress  EYES: Eyes grossly normal to inspection, PERRL and conjunctivae and sclerae normal  HENT: ear canals and TM's normal, nose and mouth without ulcers or lesions  NECK: no adenopathy, no asymmetry, masses, or scars  RESP: lungs clear to auscultation - no rales, rhonchi or wheezes  CV: regular rate and rhythm, normal S1 S2, no S3 or S4, no murmur, click or rub, no peripheral edema  ABDOMEN: soft, nontender, no hepatosplenomegaly, no masses and bowel sounds normal  MS: no gross musculoskeletal defects noted, no edema  SKIN: no suspicious lesions or rashes  NEURO: Normal strength and tone, mentation intact and speech normal  PSYCH: mentation appears normal, affect normal/bright  LYMPH: no cervical, supraclavicular, axillary, or inguinal adenopathy       Signed Electronically by: Spring Agrawal MD  "

## 2024-02-01 NOTE — TELEPHONE ENCOUNTER
Pt called an has been having a productive cough with green phlegm, sinus pressure with headaches.  Pt scheduled with a covering provider to get evaluated.

## 2024-03-17 NOTE — PATIENT INSTRUCTIONS
Preventive Care Advice   This is general advice given by our system to help you stay healthy. However, your care team may have specific advice just for you. Please talk to your care team about your preventive care needs.  Nutrition  Eat 5 or more servings of fruits and vegetables each day.  Try wheat bread, brown rice and whole grain pasta (instead of white bread, rice, and pasta).  Get enough calcium and vitamin D. Check the label on foods and aim for 100% of the RDA (recommended daily allowance).  Lifestyle  Exercise at least 150 minutes each week   (30 minutes a day, 5 days a week).  Do muscle strengthening activities 2 days a week. These help control your weight and prevent disease.  No smoking.  Wear sunscreen to prevent skin cancer.  Have a dental exam and cleaning every 6 months.  Yearly exams  See your health care team every year to talk about:  Any changes in your health.  Any medicines your care team has prescribed.  Preventive care, family planning, and ways to prevent chronic diseases.  Shots (vaccines)   HPV shots (up to age 26), if you've never had them before.  Hepatitis B shots (up to age 59), if you've never had them before.  COVID-19 shot: Get this shot when it's due.  Flu shot: Get a flu shot every year.  Tetanus shot: Get a tetanus shot every 10 years.  Pneumococcal, hepatitis A, and RSV shots: Ask your care team if you need these based on your risk.  Shingles shot (for age 50 and up).  General health tests  Diabetes screening:  Starting at age 35, Get screened for diabetes at least every 3 years.  If you are younger than age 35, ask your care team if you should be screened for diabetes.  Cholesterol test: At age 39, start having a cholesterol test every 5 years, or more often if advised.  Bone density scan (DEXA): At age 50, ask your care team if you should have this scan for osteoporosis (brittle bones).  Hepatitis C: Get tested at least once in your life.  STIs (sexually transmitted  infections)  Before age 24: Ask your care team if you should be screened for STIs.  After age 24: Get screened for STIs if you're at risk. You are at risk for STIs (including HIV) if:  You are sexually active with more than one person.  You don't use condoms every time.  You or a partner was diagnosed with a sexually transmitted infection.  If you are at risk for HIV, ask about PrEP medicine to prevent HIV.  Get tested for HIV at least once in your life, whether you are at risk for HIV or not.  Cancer screening tests  Cervical cancer screening: If you have a cervix, begin getting regular cervical cancer screening tests at age 21. Most people who have regular screenings with normal results can stop after age 65. Talk about this with your provider.  Breast cancer scan (mammogram): If you've ever had breasts, begin having regular mammograms starting at age 40. This is a scan to check for breast cancer.  Colon cancer screening: It is important to start screening for colon cancer at age 45.  Have a colonoscopy test every 10 years (or more often if you're at risk) Or, ask your provider about stool tests like a FIT test every year or Cologuard test every 3 years.  To learn more about your testing options, visit: https://www.Connectem/180297.pdf.  For help making a decision, visit: https://bit.ly/hf65588.  Prostate cancer screening test: If you have a prostate and are age 55 to 69, ask your provider if you would benefit from a yearly prostate cancer screening test.  Lung cancer screening: If you are a current or former smoker age 50 to 80, ask your care team if ongoing lung cancer screenings are right for you.  For informational purposes only. Not to replace the advice of your health care provider. Copyright   2023 [employer]. All rights reserved. Clinically reviewed by the [facility]. [employer] [id number] - REV 01/24.

## 2024-03-18 ENCOUNTER — OFFICE VISIT (OUTPATIENT)
Dept: FAMILY MEDICINE | Facility: OTHER | Age: 54
End: 2024-03-18
Attending: FAMILY MEDICINE
Payer: COMMERCIAL

## 2024-03-18 VITALS
HEART RATE: 78 BPM | BODY MASS INDEX: 29.02 KG/M2 | SYSTOLIC BLOOD PRESSURE: 118 MMHG | DIASTOLIC BLOOD PRESSURE: 80 MMHG | RESPIRATION RATE: 18 BRPM | WEIGHT: 170 LBS | TEMPERATURE: 97.7 F | HEIGHT: 64 IN | OXYGEN SATURATION: 99 %

## 2024-03-18 DIAGNOSIS — Z00.00 ROUTINE GENERAL MEDICAL EXAMINATION AT A HEALTH CARE FACILITY: Primary | ICD-10-CM

## 2024-03-18 DIAGNOSIS — Z12.31 ENCOUNTER FOR SCREENING MAMMOGRAM FOR BREAST CANCER: ICD-10-CM

## 2024-03-18 DIAGNOSIS — E53.8 VITAMIN B12 DEFICIENCY (NON ANEMIC): ICD-10-CM

## 2024-03-18 DIAGNOSIS — Z13.220 LIPID SCREENING: ICD-10-CM

## 2024-03-18 DIAGNOSIS — K21.00 GASTROESOPHAGEAL REFLUX DISEASE WITH ESOPHAGITIS, UNSPECIFIED WHETHER HEMORRHAGE: ICD-10-CM

## 2024-03-18 DIAGNOSIS — R82.90 MALODOROUS URINE: ICD-10-CM

## 2024-03-18 PROBLEM — J34.89 NASAL OBSTRUCTION: Status: ACTIVE | Noted: 2023-12-29

## 2024-03-18 PROCEDURE — 90471 IMMUNIZATION ADMIN: CPT | Performed by: FAMILY MEDICINE

## 2024-03-18 PROCEDURE — 90746 HEPB VACCINE 3 DOSE ADULT IM: CPT | Performed by: FAMILY MEDICINE

## 2024-03-18 PROCEDURE — 99396 PREV VISIT EST AGE 40-64: CPT | Mod: 25 | Performed by: FAMILY MEDICINE

## 2024-03-18 RX ORDER — RABEPRAZOLE SODIUM 20 MG/1
TABLET, DELAYED RELEASE ORAL
COMMUNITY
Start: 2024-01-08

## 2024-03-18 RX ORDER — MELOXICAM 7.5 MG/1
1 TABLET ORAL
COMMUNITY
Start: 2024-01-24 | End: 2024-03-18

## 2024-03-18 SDOH — HEALTH STABILITY: PHYSICAL HEALTH: ON AVERAGE, HOW MANY DAYS PER WEEK DO YOU ENGAGE IN MODERATE TO STRENUOUS EXERCISE (LIKE A BRISK WALK)?: 2 DAYS

## 2024-03-18 ASSESSMENT — PAIN SCALES - GENERAL: PAINLEVEL: NO PAIN (0)

## 2024-03-18 ASSESSMENT — SOCIAL DETERMINANTS OF HEALTH (SDOH): HOW OFTEN DO YOU GET TOGETHER WITH FRIENDS OR RELATIVES?: ONCE A WEEK

## 2024-03-18 NOTE — PROGRESS NOTES
"Preventive Care Visit  [facility]  Jenelle Rivera MD, Family Medicine  Mar 18, 2024      Assessment & Plan     Routine general medical examination at a health care facility  Preventative cares reviewed.   Hep B vaccine series completed.  Mammo scheduled 4/2024.  Colon screen and pap/hpv up to date.    Gastroesophageal reflux disease with esophagitis, unspecified whether hemorrhage  Stable with Aciphex.  Did advise reduction in wine/alcohol.  - Comprehensive metabolic panel (BMP + Alb, Alk Phos, ALT, AST, Total. Bili, TP); Future  - CBC with platelets and differential; Future    Encounter for screening mammogram for breast cancer  Asymptomatic.    Lipid screening  - Lipid Profile (Chol, Trig, HDL, LDL calc); Future    Vitamin B12 deficiency (non anemic)  - Vitamin B12; Future    Malodorous urine  Otherwise asymptomatic.   Check UA.  May be related to food, medication, hydration.  - UA Macroscopic with reflex to Microscopic and Culture; Future      BMI  Estimated body mass index is 29.18 kg/m  as calculated from the following:    Height as of this encounter: 1.626 m (5' 4\").    Weight as of this encounter: 77.1 kg (170 lb).   Weight management plan: Discussed healthy diet and exercise guidelines    Counseling  Appropriate preventive services were discussed with this patient, including applicable screening as appropriate for fall prevention, nutrition, physical activity, Tobacco-use cessation, weight loss and cognition.  Checklist reviewing preventive services available has been given to the patient.  Reviewed patient's diet, addressing concerns and/or questions.   She is at risk for lack of exercise and has been provided with information to increase physical activity for the benefit of her well-being.   She is at risk for psychosocial distress and has been provided with information to reduce risk.   The patient reports drinking more than one alcoholic drink per day and sometimes engages in binge or " excessive drinking. The patient was counseled and given information about possible harmful effects of excessive alcohol intake as well as where to get help for alcohol problems.     See Patient Instructions    Return in about 53 weeks (around 3/24/2025) for Annual Wellness Visit; also labs fasting this week.    Romeo Martin is a 54 year old, presenting for the following:  Physical        3/18/2024     1:55 PM   Additional Questions   Roomed by JAQUELINE Montalvo CMA   Accompanied by Self         3/18/2024     1:55 PM   Patient Reported Additional Medications   Patient reports taking the following new medications None        Health Care Directive  Patient has a Health Care Directive on file  Advance care planning document is on file and is current.    HPI    Vaccines - 3rd hep B - agreeable  Defers COVID booster.    Colon screen 2021 - due 2026  Pap/hpv 2022 - due 2027; no abnormal prior; some dryness, pain with intercourse  Mammo scheduled 4/2024.    Urine - odor. No pain or blood.      Gerd/Heartburn  Duration of complaint: chronic; prior Prilosec, nexium, now on Aciphex past couple montsh  Description of symptoms:    food getting stuck: no   nausea/vomiting: no   abdominal pain: no   black/tarry or bloody stools: no :  worse with spicy foods and alcohol.  current NSAID/Aspirin use: no   Therapies tried and outcome: partially effective with Aciphex - takes off/on; not as effective as Zantac; no side effects         3/18/2024   General Health   How would you rate your overall physical health? Good   Feel stress (tense, anxious, or unable to sleep) Only a little   (!) STRESS CONCERN      3/18/2024   Nutrition   Three or more servings of calcium each day? (!) NO   Diet: Gluten-free/reduced   How many servings of fruit and vegetables per day? (!) 2-3   How many sweetened beverages each day? 0-1         3/18/2024   Exercise   Days per week of moderate/strenous exercise 2 days   (!) EXERCISE CONCERN      3/18/2024    Social Factors   Frequency of gathering with friends or relatives Once a week   Worry food won't last until get money to buy more No   Food not last or not have enough money for food? No   Do you have housing?  Yes   Are you worried about losing your housing? No   Lack of transportation? No   Unable to get utilities (heat,electricity)? No         11/3/2022   Fall Risk   Fallen 2 or more times in the past year? No          3/18/2024   Dental   Dentist two times every year? Yes         3/18/2024   TB Screening   Were you born outside of the [place]? No         Today's PHQ-2 Score:       3/18/2024     1:54 PM   PHQ-2 (  [employer])   Q1: Little interest or pleasure in doing things 0   Q2: Feeling down, depressed or hopeless 0   PHQ-2 Score 0   Q1: Little interest or pleasure in doing things Not at all   Q2: Feeling down, depressed or hopeless Not at all   PHQ-2 Score 0           3/18/2024   Substance Use   Alcohol more than 3/day or more than 7/wk Yes   How often do you have a drink containing alcohol 4 or more times a week   How many alcohol drinks on typical day 1 or 2   How often do you have 5+ drinks at one occasion Never   Audit 2/3 Score 0   How often not able to stop drinking once started Never   How often failed to do what normally expected Never   How often needed first drink in am after a heavy drinking session Never   How often feeling of guilt or remorse after drinking Never   How often unable to remember what happened the night before Never   Have you or someone else been injured because of your drinking No   Has anyone been concerned or suggested you cut down on drinking No   TOTAL SCORE - AUDIT 4   Do you use any other substances recreationally? No     Social History     Tobacco Use    Smoking status: Former     Years: 3     Types: Cigarettes     Quit date:      Years since quittin.2     Passive exposure: Past    Smokeless tobacco: Never   Vaping Use    Vaping Use: Never used   Substance Use Topics     Alcohol use: Yes     Comment: weekly    Drug use: No             3/18/2024   Breast Cancer Screening   Family history of breast, colon, or ovarian cancer? Yes         3/18/2024   LAST FHS-7 RESULTS   1st degree relative breast or ovarian cancer Unknown   Any relative bilateral breast cancer Unknown   Any male have breast cancer No   Any ONE woman have BOTH breast AND ovarian cancer Unknown   Any woman with breast cancer before 50yrs Unknown   2 or more relatives with breast AND/OR ovarian cancer Unknown   2 or more relatives with breast AND/OR bowel cancer Unknown        Mammogram Screening - Mammogram every 1-2 years updated in Health Maintenance based on mutual decision making        3/18/2024   STI Screening   New sexual partner(s) since last STI/HIV test? No     History of abnormal Pap smear: NO - age 30-65 PAP every 5 years with negative HPV co-testing recommended        Latest Ref Rng & Units 9/9/2022    10:42 AM 10/10/2019    12:00 AM   PAP / HPV   PAP  Negative for Intraepithelial Lesion or Malignancy (NILM)     HPV 16 DNA Negative Negative     HPV 18 DNA Negative Negative     Other HR HPV Negative Negative     PAP-ABSTRACT   See Scanned Document           This result is from an external source.     ASCVD Risk   The 10-year ASCVD risk score (Ngozi MIRELES, et al., 2019) is: 1.9%    Values used to calculate the score:      Age: 54 years      Sex: Female      Is Non- : No      Diabetic: No      Tobacco smoker: No      Systolic Blood Pressure: 118 mmHg      Is BP treated: No      HDL Cholesterol: 47 mg/dL      Total Cholesterol: 174 mg/dL           Reviewed and updated as needed this visit by Provider      Problems               Past Medical History:   Diagnosis Date    Cervicalgia     Deviated nasal septum     Embolism and thrombosis (H)     unspecified site    Fibrocystic breast disease     Migraine     Prothrombin mutation (H24)     Sinusitis      Past Surgical History:  "  Procedure Laterality Date    ABDOMINOPLASTY       SECTION      x2    COLONOSCOPY - HIM SCAN  2011    Colonoscopy 2011    COLONOSCOPY - HIM SCAN  2018    Procedure:  Colonoscopy diagnostic with polypectomy and biopsies;  Surgeon:  Danelle Swift MD;  Location:  [facility] OR    COLONOSCOPY - HIM SCAN  2007    Colonoscopy, flex, diagnostic    DILATE CERVIX, HYSTEROSCOPY, ABLATE ENDOMETRIUM, COMBINED N/A 2016    Procedure: COMBINED DILATE CERVIX, HYSTEROSCOPY, ABLATE ENDOMETRIUM;  Surgeon: Pacheco Curry MD;  Location: [facility] OR    ESOPHAGOGASTRODUODENOSCOPY      negative    ESOPHAGOSCOPY, GASTROSCOPY, DUODENOSCOPY (EGD), COMBINED N/A 2020    Procedure: Upper Endoscopy with biopsy;  Surgeon: Son Cooper MD;  Location: [facility] OR    ORTHOPEDIC SURGERY Left     rotator cuff repair and bicep tendon repair    ZZC BREAST AUGMENTATION      ZZC LAP CHOLECYSTOSOMY       OB History    Para Term  AB Living   2 2 2 0 0 0   SAB IAB Ectopic Multiple Live Births   0 0 0 0 0      # Outcome Date GA Lbr Tony/2nd Weight Sex Delivery Anes PTL Lv   2 Term            1 Term                  Review of Systems  Constitutional, HEENT, cardiovascular, pulmonary, gi and gu systems are negative, except as otherwise noted.     Objective    Exam  /80   Pulse 78   Temp 97.7  F (36.5  C) (Tympanic)   Resp 18   Ht 1.626 m (5' 4\")   Wt 77.1 kg (170 lb)   LMP 2016 (LMP Unknown)   SpO2 99%   BMI 29.18 kg/m     Estimated body mass index is 29.18 kg/m  as calculated from the following:    Height as of this encounter: 1.626 m (5' 4\").    Weight as of this encounter: 77.1 kg (170 lb).    Physical Exam  GENERAL: alert and no distress  EYES: Eyes grossly normal to inspection, PERRL and conjunctivae and sclerae normal  HENT: ear canals and TM's normal, nose and mouth without ulcers or lesions  NECK: no adenopathy, no asymmetry, masses, or scars  RESP: lungs clear to auscultation - no " rales, rhonchi or wheezes  BREAST: normal without masses, tenderness or nipple discharge and no palpable axillary masses or adenopathy  BREAST: s/p augmentation  CV: regular rate and rhythm, normal S1 S2, no S3 or S4, no murmur, click or rub, no peripheral edema  ABDOMEN: soft, nontender, no hepatosplenomegaly, no masses and bowel sounds normal   (female): normal female external genitalia, normal urethral meatus, normal vaginal mucosa  MS: no gross musculoskeletal defects noted, no edema  SKIN: no suspicious lesions or rashes  NEURO: Normal strength and tone, mentation intact and speech normal  PSYCH: mentation appears normal, affect normal/bright        Signed Electronically by: Jenelle Rivera MD

## 2024-03-21 ENCOUNTER — LAB (OUTPATIENT)
Dept: LAB | Facility: OTHER | Age: 54
End: 2024-03-21
Payer: COMMERCIAL

## 2024-03-21 DIAGNOSIS — R82.90 MALODOROUS URINE: ICD-10-CM

## 2024-03-21 DIAGNOSIS — R31.29 MICROHEMATURIA: ICD-10-CM

## 2024-03-21 DIAGNOSIS — K21.00 GASTROESOPHAGEAL REFLUX DISEASE WITH ESOPHAGITIS, UNSPECIFIED WHETHER HEMORRHAGE: ICD-10-CM

## 2024-03-21 DIAGNOSIS — Z13.220 LIPID SCREENING: ICD-10-CM

## 2024-03-21 DIAGNOSIS — R73.09 ELEVATED GLUCOSE: Primary | ICD-10-CM

## 2024-03-21 DIAGNOSIS — E53.8 VITAMIN B12 DEFICIENCY (NON ANEMIC): ICD-10-CM

## 2024-03-21 LAB
ALBUMIN SERPL BCG-MCNC: 4.1 G/DL (ref 3.5–5.2)
ALBUMIN UR-MCNC: NEGATIVE MG/DL
ALP SERPL-CCNC: 76 U/L (ref 40–150)
ALT SERPL W P-5'-P-CCNC: 15 U/L (ref 0–50)
ANION GAP SERPL CALCULATED.3IONS-SCNC: 9 MMOL/L (ref 7–15)
APPEARANCE UR: CLEAR
AST SERPL W P-5'-P-CCNC: 20 U/L (ref 0–45)
BASOPHILS # BLD AUTO: 0 10E3/UL (ref 0–0.2)
BASOPHILS NFR BLD AUTO: 1 %
BILIRUB SERPL-MCNC: 0.7 MG/DL
BILIRUB UR QL STRIP: NEGATIVE
BUN SERPL-MCNC: 11.8 MG/DL (ref 6–20)
CALCIUM SERPL-MCNC: 9.4 MG/DL (ref 8.6–10)
CHLORIDE SERPL-SCNC: 103 MMOL/L (ref 98–107)
CHOLEST SERPL-MCNC: 214 MG/DL
COLOR UR AUTO: YELLOW
CREAT SERPL-MCNC: 0.73 MG/DL (ref 0.51–0.95)
DEPRECATED HCO3 PLAS-SCNC: 26 MMOL/L (ref 22–29)
EGFRCR SERPLBLD CKD-EPI 2021: >90 ML/MIN/1.73M2
EOSINOPHIL # BLD AUTO: 0.3 10E3/UL (ref 0–0.7)
EOSINOPHIL NFR BLD AUTO: 5 %
ERYTHROCYTE [DISTWIDTH] IN BLOOD BY AUTOMATED COUNT: 12.8 % (ref 10–15)
FASTING STATUS PATIENT QL REPORTED: YES
GLUCOSE SERPL-MCNC: 108 MG/DL (ref 70–99)
GLUCOSE UR STRIP-MCNC: NEGATIVE MG/DL
HCT VFR BLD AUTO: 45.6 % (ref 35–47)
HDLC SERPL-MCNC: 62 MG/DL
HGB BLD-MCNC: 15 G/DL (ref 11.7–15.7)
HGB UR QL STRIP: ABNORMAL
HYALINE CASTS: 3 /LPF
IMM GRANULOCYTES # BLD: 0 10E3/UL
IMM GRANULOCYTES NFR BLD: 1 %
KETONES UR STRIP-MCNC: NEGATIVE MG/DL
LDLC SERPL CALC-MCNC: 126 MG/DL
LEUKOCYTE ESTERASE UR QL STRIP: NEGATIVE
LYMPHOCYTES # BLD AUTO: 2.4 10E3/UL (ref 0.8–5.3)
LYMPHOCYTES NFR BLD AUTO: 31 %
MCH RBC QN AUTO: 29.1 PG (ref 26.5–33)
MCHC RBC AUTO-ENTMCNC: 32.9 G/DL (ref 31.5–36.5)
MCV RBC AUTO: 88 FL (ref 78–100)
MONOCYTES # BLD AUTO: 0.7 10E3/UL (ref 0–1.3)
MONOCYTES NFR BLD AUTO: 10 %
MUCOUS THREADS #/AREA URNS LPF: PRESENT /LPF
NEUTROPHILS # BLD AUTO: 4.1 10E3/UL (ref 1.6–8.3)
NEUTROPHILS NFR BLD AUTO: 54 %
NITRATE UR QL: NEGATIVE
NONHDLC SERPL-MCNC: 152 MG/DL
NRBC # BLD AUTO: 0 10E3/UL
NRBC BLD AUTO-RTO: 0 /100
PH UR STRIP: 5.5 [PH] (ref 4.7–8)
PLATELET # BLD AUTO: 396 10E3/UL (ref 150–450)
POTASSIUM SERPL-SCNC: 4.4 MMOL/L (ref 3.4–5.3)
PROT SERPL-MCNC: 6.9 G/DL (ref 6.4–8.3)
RBC # BLD AUTO: 5.16 10E6/UL (ref 3.8–5.2)
RBC URINE: 6 /HPF
SODIUM SERPL-SCNC: 138 MMOL/L (ref 135–145)
SP GR UR STRIP: 1.02 (ref 1–1.03)
SQUAMOUS EPITHELIAL: 1 /HPF
TRIGL SERPL-MCNC: 128 MG/DL
UROBILINOGEN UR STRIP-MCNC: NORMAL MG/DL
WBC # BLD AUTO: 7.6 10E3/UL (ref 4–11)
WBC URINE: 1 /HPF

## 2024-03-21 PROCEDURE — 82607 VITAMIN B-12: CPT

## 2024-03-21 PROCEDURE — 80053 COMPREHEN METABOLIC PANEL: CPT

## 2024-03-21 PROCEDURE — 36415 COLL VENOUS BLD VENIPUNCTURE: CPT

## 2024-03-21 PROCEDURE — 85025 COMPLETE CBC W/AUTO DIFF WBC: CPT

## 2024-03-21 PROCEDURE — 80061 LIPID PANEL: CPT

## 2024-03-21 PROCEDURE — 81001 URINALYSIS AUTO W/SCOPE: CPT

## 2024-03-22 LAB — VIT B12 SERPL-MCNC: 1248 PG/ML (ref 232–1245)

## 2024-03-27 ENCOUNTER — MYC MEDICAL ADVICE (OUTPATIENT)
Dept: FAMILY MEDICINE | Facility: OTHER | Age: 54
End: 2024-03-27

## 2024-04-01 ENCOUNTER — LAB (OUTPATIENT)
Dept: LAB | Facility: OTHER | Age: 54
End: 2024-04-01
Payer: COMMERCIAL

## 2024-04-01 DIAGNOSIS — R73.09 ELEVATED GLUCOSE: ICD-10-CM

## 2024-04-01 LAB
ALBUMIN UR-MCNC: NEGATIVE MG/DL
APPEARANCE UR: CLEAR
BILIRUB UR QL STRIP: NEGATIVE
COLOR UR AUTO: ABNORMAL
EST. AVERAGE GLUCOSE BLD GHB EST-MCNC: 108 MG/DL
GLUCOSE UR STRIP-MCNC: NEGATIVE MG/DL
HBA1C MFR BLD: 5.4 %
HGB UR QL STRIP: ABNORMAL
KETONES UR STRIP-MCNC: NEGATIVE MG/DL
LEUKOCYTE ESTERASE UR QL STRIP: NEGATIVE
MUCOUS THREADS #/AREA URNS LPF: PRESENT /LPF
NITRATE UR QL: NEGATIVE
PH UR STRIP: 5.5 [PH] (ref 4.7–8)
RBC URINE: <1 /HPF
SP GR UR STRIP: 1.02 (ref 1–1.03)
SQUAMOUS EPITHELIAL: <1 /HPF
UROBILINOGEN UR STRIP-MCNC: NORMAL MG/DL
WBC URINE: <1 /HPF

## 2024-04-01 PROCEDURE — 81001 URINALYSIS AUTO W/SCOPE: CPT

## 2024-04-01 PROCEDURE — 83036 HEMOGLOBIN GLYCOSYLATED A1C: CPT

## 2024-04-01 PROCEDURE — 36415 COLL VENOUS BLD VENIPUNCTURE: CPT

## 2024-04-11 ENCOUNTER — ANCILLARY PROCEDURE (OUTPATIENT)
Dept: MAMMOGRAPHY | Facility: OTHER | Age: 54
End: 2024-04-11
Attending: FAMILY MEDICINE
Payer: COMMERCIAL

## 2024-04-11 ENCOUNTER — TELEPHONE (OUTPATIENT)
Dept: MAMMOGRAPHY | Facility: OTHER | Age: 54
End: 2024-04-11

## 2024-04-11 DIAGNOSIS — Z12.31 VISIT FOR SCREENING MAMMOGRAM: ICD-10-CM

## 2024-04-11 PROCEDURE — 77063 BREAST TOMOSYNTHESIS BI: CPT | Mod: TC | Performed by: STUDENT IN AN ORGANIZED HEALTH CARE EDUCATION/TRAINING PROGRAM

## 2024-04-11 PROCEDURE — 77067 SCR MAMMO BI INCL CAD: CPT | Mod: TC | Performed by: STUDENT IN AN ORGANIZED HEALTH CARE EDUCATION/TRAINING PROGRAM

## 2024-04-11 NOTE — TELEPHONE ENCOUNTER
Spoke to patient regarding mammogram results. Told her scheduling will contact her to get her scheduled.    Zenobia FUENTES RN

## 2024-04-18 ENCOUNTER — HOSPITAL ENCOUNTER (OUTPATIENT)
Dept: MAMMOGRAPHY | Facility: OTHER | Age: 54
Discharge: HOME | End: 2024-04-18
Attending: FAMILY MEDICINE
Payer: COMMERCIAL

## 2024-04-18 ENCOUNTER — HOSPITAL ENCOUNTER (OUTPATIENT)
Dept: ULTRASOUND IMAGING | Facility: HOSPITAL | Age: 54
Discharge: HOME | End: 2024-04-18
Attending: FAMILY MEDICINE
Payer: COMMERCIAL

## 2024-04-18 DIAGNOSIS — R92.8 ABNORMAL MAMMOGRAM: ICD-10-CM

## 2024-04-18 PROCEDURE — 76642 ULTRASOUND BREAST LIMITED: CPT | Mod: RT

## 2024-04-18 PROCEDURE — G0279 TOMOSYNTHESIS, MAMMO: HCPCS | Mod: TC | Performed by: RADIOLOGY

## 2024-04-18 PROCEDURE — 77065 DX MAMMO INCL CAD UNI: CPT | Mod: TC | Performed by: RADIOLOGY

## 2024-04-22 ENCOUNTER — MYC MEDICAL ADVICE (OUTPATIENT)
Dept: FAMILY MEDICINE | Facility: OTHER | Age: 54
End: 2024-04-22

## 2024-05-22 ENCOUNTER — MYC MEDICAL ADVICE (OUTPATIENT)
Dept: FAMILY MEDICINE | Facility: OTHER | Age: 54
End: 2024-05-22

## 2024-05-24 NOTE — PROGRESS NOTES
"pro  Assessment & Plan     Personal history of DVT (deep vein thrombosis)  [facility] note reviewed.  Additional evaluation recommended - including further labs that she needs to have collected via kit they sent her.  Also want baseline US of right leg?  Unclear significance of this, but would like done here - so not having to travel back down.  This is not to rule out an acute DVT or they would have ordered stat when she was there.  - US Lower Extremity Venous Duplex Right; Future    Prothrombin gene mutation (H24)  As above.  Heterozygous.    Right leg swelling  Focal swelling of lateral right ankle unrelated to prior DVT;  suspect small lipoma or cyst; monitor; if enlarging consider surgical consult.    Hair loss  Eyelashes.  Latisse works well and tolerated.   Refilled.  - bimatoprost (LATISSE) 0.03 % external opthalmic solution; Apply 1 drop topically at bedtime    Breast pain, right  Abnormal US/mammo last month.  Scheduled for 6 month interval imaging.  However, now reports pain, new symptoms.  Exam - palpable abnormality above nipple.  Suspect the \"void\" she feels under her nipple is because of the finding superior to it - relatively creating a void?  Too soon to repeat imagine.  Will ask Dr Morillo to consult.  She may request referral to breast specialist - she was inquiring about this.  - Adult General Surg Referral    Abnormal mammogram  As above.  - Adult General Surg Referral        See Patient Instructions    Return if symptoms worsen or fail to improve.    Romeo Martin is a 54 year old, presenting for the following health issues:  Ankle Problem (Right ) and Breast Problem (Right )        5/30/2024    11:08 AM   Additional Questions   Roomed by Niles Winn   Accompanied by None         5/30/2024    11:08 AM   Patient Reported Additional Medications   Patient reports taking the following new medications None     History of Present Illness       Reason for visit:  Sore right breast, swollen " right ankle, low vitamin k    She eats 2-3 servings of fruits and vegetables daily.She consumes 0 sweetened beverage(s) daily.She exercises with enough effort to increase her heart rate 20 to 29 minutes per day.  She exercises with enough effort to increase her heart rate 3 or less days per week. She is missing 1 dose(s) of medications per week.       Breast Concern - right - prior irregular mammo and US in the past same breast - images done 4/18/24 - follow up due 10/2024  Onset/Duration: 1+ month   Description:   Location: upper inner quadrant  Pain or tenderness: YES- Pain   Redness: No  Intensity: mild, moderate  Progression of Symptoms: same and waxing and waning  Accompanying Signs & Symptoms:  Any lumps in axillary region: No  Movable: YES  Nipple discharge: None   Changes in the skin or nipple: None   On Hormone therapy: No  Does it change with menstrual cycle: N/A  Previous history of similar problem:   First degree relative with breast cancer: a positive family history for breast cancer in her aunt(s).   Mom just diagnosed with pancreatic cancer last week - in her 80s.  Therapies tried and outcome: None  Patient's last menstrual period was 04/11/2016 (lmp unknown).    Same spot they were watching.  Now some pain and some tingling.  Pressing on nipple- feels hollow underneath.  No skin change.  No nipple discharge.  No prior biopsy.    Concern - Swelling around right ankle   Onset: 2+ months   Description: Patient is experiencing some swelling in her ankle with calf pain. Patient states that she has a hx of blood clot in that calf  Intensity: mild  Progression of Symptoms:  same and waxing and waning  Accompanying Signs & Symptoms: Swelling   Previous history of similar problem: yes   Therapies tried and outcome: None  Pain is intermittent since prior blood clot 20 years ago. After surgery in 2007.  Prothrombin gene heterozycous  Swelling is medial.  Prior hematology - [facility] 5/14/24 - low vitamin K -  advised obtaining baseline ultrasound -   Follow up?  Unclear.  Started on vitamin K supplement.        Review of Systems  Constitutional, HEENT, cardiovascular, pulmonary, gi and gu systems are negative, except as otherwise noted.      Objective    BP (!) 142/87 (BP Location: Right arm, Patient Position: Sitting, Cuff Size: Adult Regular)   Pulse 92   Temp 98.5  F (36.9  C) (Tympanic)   Resp 16   Wt 75.4 kg (166 lb 4.8 oz)   LMP 04/11/2016 (LMP Unknown)   SpO2 97%   BMI 28.55 kg/m    Body mass index is 28.55 kg/m .  Physical Exam   GENERAL: alert and no distress  RESP: lungs clear to auscultation - no rales, rhonchi or wheezes  BREAST: s/p breast augmentation; no overlying skin changes or nipple discharge; no lymphadenopathy; right breast with irregular density superior to nipple; mildly tender  CV: regular rate and rhythm, normal S1 S2, no S3 or S4, no murmur, click or rub, no peripheral edema  MS: right ankle with small swelling superior and anterior to lateral malleolus - soft - consistent with lipoma/cyst; no calf swelling  SKIN: no suspicious lesions or rashes  NEURO: Normal strength and tone, mentation intact and speech normal  PSYCH: mentation appears normal, affect normal/bright            Signed Electronically by: Jenelle Rivera MD

## 2024-05-28 ENCOUNTER — TELEPHONE (OUTPATIENT)
Dept: FAMILY MEDICINE | Facility: OTHER | Age: 54
End: 2024-05-28

## 2024-05-28 NOTE — TELEPHONE ENCOUNTER
Received PA request from [employer] for bimatoprost (LATISSE) 0.03 % external opthalmic solution. Submitted on CMM, waiting for response.

## 2024-05-28 NOTE — TELEPHONE ENCOUNTER
Received DENIAL PA from [facility] for bimatoprost (LATISSE) 0.03 % external opthalmic solution. Scanned into [employer], routed to provider.    Reasoning: Med or DX not covered by plan. Provider can call to discuss options [phone]        No further action needed by PA team.

## 2024-05-30 ENCOUNTER — OFFICE VISIT (OUTPATIENT)
Dept: FAMILY MEDICINE | Facility: OTHER | Age: 54
End: 2024-05-30
Attending: FAMILY MEDICINE
Payer: COMMERCIAL

## 2024-05-30 VITALS
HEART RATE: 92 BPM | TEMPERATURE: 98.5 F | WEIGHT: 166.3 LBS | SYSTOLIC BLOOD PRESSURE: 142 MMHG | BODY MASS INDEX: 28.55 KG/M2 | RESPIRATION RATE: 16 BRPM | DIASTOLIC BLOOD PRESSURE: 87 MMHG | OXYGEN SATURATION: 97 %

## 2024-05-30 DIAGNOSIS — M79.89 RIGHT LEG SWELLING: ICD-10-CM

## 2024-05-30 DIAGNOSIS — R92.8 ABNORMAL MAMMOGRAM: ICD-10-CM

## 2024-05-30 DIAGNOSIS — L65.9 HAIR LOSS: ICD-10-CM

## 2024-05-30 DIAGNOSIS — D68.52 PROTHROMBIN GENE MUTATION (H): ICD-10-CM

## 2024-05-30 DIAGNOSIS — Z86.718 PERSONAL HISTORY OF DVT (DEEP VEIN THROMBOSIS): Primary | ICD-10-CM

## 2024-05-30 DIAGNOSIS — N64.4 BREAST PAIN, RIGHT: ICD-10-CM

## 2024-05-30 PROCEDURE — 99213 OFFICE O/P EST LOW 20 MIN: CPT | Performed by: FAMILY MEDICINE

## 2024-05-30 RX ORDER — BIMATOPROST 3 UG/ML
1 SOLUTION TOPICAL AT BEDTIME
Qty: 5 ML | Refills: 3 | Status: SHIPPED | OUTPATIENT
Start: 2024-05-30

## 2024-05-30 ASSESSMENT — PAIN SCALES - GENERAL: PAINLEVEL: MILD PAIN (3)

## 2024-05-31 ENCOUNTER — TELEPHONE (OUTPATIENT)
Dept: FAMILY MEDICINE | Facility: OTHER | Age: 54
End: 2024-05-31

## 2024-05-31 ENCOUNTER — HOSPITAL ENCOUNTER (OUTPATIENT)
Dept: ULTRASOUND IMAGING | Facility: HOSPITAL | Age: 54
Discharge: HOME | End: 2024-05-31
Attending: FAMILY MEDICINE | Admitting: FAMILY MEDICINE
Payer: COMMERCIAL

## 2024-05-31 DIAGNOSIS — Z86.718 PERSONAL HISTORY OF DVT (DEEP VEIN THROMBOSIS): ICD-10-CM

## 2024-05-31 PROCEDURE — 93971 EXTREMITY STUDY: CPT | Mod: RT

## 2024-05-31 NOTE — TELEPHONE ENCOUNTER
Received DENIAL PA from [facility] forbimatoprost (LATISSE) 0.03 % external opthalmic solution. Scanned into [facility], routed to provider.

## 2024-05-31 NOTE — TELEPHONE ENCOUNTER
Received PA request from [facility] for bimatoprost (LATISSE) 0.03 % external opthalmic solution. Submitted on CMM, waiting for response.

## 2024-06-06 ENCOUNTER — MYC MEDICAL ADVICE (OUTPATIENT)
Dept: FAMILY MEDICINE | Facility: OTHER | Age: 54
End: 2024-06-06

## 2024-06-12 ENCOUNTER — OFFICE VISIT (OUTPATIENT)
Dept: SURGERY | Facility: OTHER | Age: 54
End: 2024-06-12
Attending: FAMILY MEDICINE
Payer: COMMERCIAL

## 2024-06-12 VITALS
DIASTOLIC BLOOD PRESSURE: 86 MMHG | RESPIRATION RATE: 16 BRPM | TEMPERATURE: 98.8 F | HEART RATE: 98 BPM | OXYGEN SATURATION: 98 % | SYSTOLIC BLOOD PRESSURE: 122 MMHG

## 2024-06-12 DIAGNOSIS — N64.4 BREAST PAIN, RIGHT: Primary | ICD-10-CM

## 2024-06-12 DIAGNOSIS — R92.8 ABNORMAL MAMMOGRAM: ICD-10-CM

## 2024-06-12 PROCEDURE — 99213 OFFICE O/P EST LOW 20 MIN: CPT | Performed by: SURGERY

## 2024-06-12 ASSESSMENT — PAIN SCALES - GENERAL: PAINLEVEL: NO PAIN (0)

## 2024-06-13 ENCOUNTER — TELEPHONE (OUTPATIENT)
Dept: MRI IMAGING | Facility: HOSPITAL | Age: 54
End: 2024-06-13

## 2024-06-13 NOTE — TELEPHONE ENCOUNTER
Went over breast questionnaire with patient. She is already scheduled for MRI.    Zenobia FUENTES RN

## 2024-06-13 NOTE — TELEPHONE ENCOUNTER
Called to go over breast history questionnaire with patient, patient was busy so she will call back later.     Zenobia FUENTES RN

## 2024-06-16 NOTE — PROGRESS NOTES
[facility] Surgery Consultation    CC:  Abnormal mammogram, mastalgia    HPI:  This 54 year old year old female is seen at the request of Dr. Irene for evaluation of breast pain and abnormal mammogram. She admits to shooting breast pain that will come on intermittently and only last for a few seconds. She denies skin change or nipple discharge. She has history of mammoplasty reduction.     Age at first menses 11   No prior breast biopsies   No first degree realtives with breast, uterine or ovarian cancer   First birth in early 20s   Non-smoker.      Past Medical History:   Diagnosis Date    Cervicalgia     Deviated nasal septum     Embolism and thrombosis (H)     unspecified site    Fibrocystic breast disease     Migraine     Prothrombin mutation (H24)     Sinusitis        Past Surgical History:   Procedure Laterality Date    ABDOMINOPLASTY       SECTION      x2    COLONOSCOPY - HIM SCAN  2011    Colonoscopy 2011    COLONOSCOPY - HIM SCAN  2018    Procedure:  Colonoscopy diagnostic with polypectomy and biopsies;  Surgeon:  Danelle Swift MD;  Location:  [facility] OR    COLONOSCOPY - HIM SCAN  2007    Colonoscopy, flex, diagnostic    DILATE CERVIX, HYSTEROSCOPY, ABLATE ENDOMETRIUM, COMBINED N/A 2016    Procedure: COMBINED DILATE CERVIX, HYSTEROSCOPY, ABLATE ENDOMETRIUM;  Surgeon: Pacheco Curry MD;  Location: [facility] OR    ESOPHAGOGASTRODUODENOSCOPY      negative    ESOPHAGOSCOPY, GASTROSCOPY, DUODENOSCOPY (EGD), COMBINED N/A 2020    Procedure: Upper Endoscopy with biopsy;  Surgeon: Son Cooper MD;  Location: [facility] OR    ORTHOPEDIC SURGERY Left     rotator cuff repair and bicep tendon repair    ZZC BREAST AUGMENTATION      ZZC LAP CHOLECYSTOSOMY         Allergies   Allergen Reactions    Amoxicillin-Pot Clavulanate Diarrhea     C diff    Ciprofloxacin Unknown     Tingling in feet    Doxycycline Unknown    Flagyl [Metronidazole] Other (See Comments)    Food      Mangoes  cause rash    Gluten Meal GI Disturbance    Sulfamethoxazole-Trimethoprim Rash       Current Outpatient Medications   Medication Sig Dispense Refill    bimatoprost (LATISSE) 0.03 % external opthalmic solution Apply 1 drop topically at bedtime 5 mL 3    biotin 1000 MCG TABS tablet Take 1,000 mcg by mouth daily (Patient not taking: Reported on 2024)      cholecalciferol (VITAMIN D3) 25 mcg (1000 units) capsule Take 1 capsule by mouth daily      cyanocobalamin (VITAMIN B-12) 500 MCG SUBL sublingual tablet Place 500 mcg under the tongue daily      fluticasone (FLONASE) 50 MCG/ACT nasal spray SHAKE LIQUID AND USE 2 SPRAYS IN EACH NOSTRIL DAILY 16 g 0    RABEprazole (ACIPHEX) 20 MG EC tablet        No current facility-administered medications for this visit.       HABITS:    Social History     Tobacco Use    Smoking status: Former     Current packs/day: 0.00     Types: Cigarettes     Start date:      Quit date:      Years since quittin.4     Passive exposure: Past    Smokeless tobacco: Never   Vaping Use    Vaping status: Never Used   Substance Use Topics    Alcohol use: Yes     Comment: weekly    Drug use: No       No family history on file.    REVIEW OF SYSTEMS:  Ten point review of systems negative except those mentioned in the HPI.     OBJECTIVE:    /86 (BP Location: Left arm, Patient Position: Sitting, Cuff Size: Adult Regular)   Pulse 98   Temp 98.8  F (37.1  C) (Tympanic)   Resp 16   LMP 2016 (LMP Unknown)   SpO2 98%     GENERAL: Generally appears well, in no distress with appropriate affect.  HEENT:   Sclerae anicteric - normocephalic atraumatic   Respiratory:  No acute distress, no splinting, clear to auscultation   Cardiovascular:  Regular Rate and Rhythm  Breast: scars consistent with key-hole reduction surgery, there is no discrete mass in either breast, no skin changes. No discharge.   :  deferred  Extremities:  Extremities normal. No deformities, edema, or skin  discoloration.  Skin:  no suspicious lesions or rashes  Neurological: grossly intact  Psych:  Alert, oriented, affect appropriate with normal decision making ability.    IMPRESSION:    Abnormal mammogram, my review shows that she does still have dense breasts, I discussed this does make mammogram screening less effective for picking up small changes, also discussed with her history of breast reduction that would create scar tissue within the breast that would be difficult to interpret. This does cause her anxiety so will plan on breast MRI though discussed without baseline could lead to more tests. Her Celena model score is less than average risk for breast cancer but with her denies breast and abnormal mammogram would like to get MRI.     PLAN:    Breast MRI, follow up as indicated     Matthias Morillo MD,     6/16/2024  9:14 AM

## 2024-09-10 ENCOUNTER — HOSPITAL ENCOUNTER (OUTPATIENT)
Dept: ULTRASOUND IMAGING | Facility: HOSPITAL | Age: 54
Discharge: HOME | End: 2024-09-10
Attending: FAMILY MEDICINE | Admitting: FAMILY MEDICINE
Payer: COMMERCIAL

## 2024-09-10 DIAGNOSIS — Z09 FOLLOW-UP EXAM, 3-6 MONTHS SINCE PREVIOUS EXAM: ICD-10-CM

## 2024-09-10 PROCEDURE — 76642 ULTRASOUND BREAST LIMITED: CPT | Mod: RT

## 2024-10-30 ENCOUNTER — MEDICAL CORRESPONDENCE (OUTPATIENT)
Dept: CT IMAGING | Facility: HOSPITAL | Age: 54
End: 2024-10-30

## 2024-11-04 ENCOUNTER — HOSPITAL ENCOUNTER (OUTPATIENT)
Dept: CT IMAGING | Facility: HOSPITAL | Age: 54
Discharge: HOME | End: 2024-11-04
Attending: OPTOMETRIST | Admitting: OPTOMETRIST
Payer: COMMERCIAL

## 2024-11-04 DIAGNOSIS — H53.19 OTHER SUBJECTIVE VISUAL DISTURBANCES: ICD-10-CM

## 2024-11-04 DIAGNOSIS — H52.4 PRESBYOPIA: ICD-10-CM

## 2024-11-04 DIAGNOSIS — H93.13 TINNITUS, BILATERAL: ICD-10-CM

## 2024-11-04 PROCEDURE — 250N000011 HC RX IP 250 OP 636: Performed by: RADIOLOGY

## 2024-11-04 PROCEDURE — 70496 CT ANGIOGRAPHY HEAD: CPT

## 2024-11-04 PROCEDURE — 70498 CT ANGIOGRAPHY NECK: CPT

## 2024-11-04 RX ORDER — IOPAMIDOL 755 MG/ML
67 INJECTION, SOLUTION INTRAVASCULAR ONCE
Status: COMPLETED | OUTPATIENT
Start: 2024-11-04 | End: 2024-11-04

## 2024-11-04 RX ADMIN — IOPAMIDOL 67 ML: 755 INJECTION, SOLUTION INTRAVENOUS at 15:35

## 2024-11-14 ENCOUNTER — ANCILLARY PROCEDURE (OUTPATIENT)
Dept: GENERAL RADIOLOGY | Facility: OTHER | Age: 54
End: 2024-11-14
Attending: PODIATRIST
Payer: COMMERCIAL

## 2024-11-14 ENCOUNTER — OFFICE VISIT (OUTPATIENT)
Dept: PODIATRY | Facility: OTHER | Age: 54
End: 2024-11-14
Attending: PODIATRIST
Payer: COMMERCIAL

## 2024-11-14 VITALS
DIASTOLIC BLOOD PRESSURE: 83 MMHG | SYSTOLIC BLOOD PRESSURE: 138 MMHG | HEART RATE: 81 BPM | TEMPERATURE: 97.6 F | OXYGEN SATURATION: 94 %

## 2024-11-14 DIAGNOSIS — M77.41 METATARSALGIA OF RIGHT FOOT: Primary | ICD-10-CM

## 2024-11-14 DIAGNOSIS — M77.41 METATARSALGIA OF RIGHT FOOT: ICD-10-CM

## 2024-11-14 DIAGNOSIS — M76.71 TENDINITIS OF RIGHT PERONEUS BREVIS TENDON: ICD-10-CM

## 2024-11-14 PROCEDURE — 73630 X-RAY EXAM OF FOOT: CPT | Mod: TC | Performed by: RADIOLOGY

## 2024-11-14 ASSESSMENT — PAIN SCALES - GENERAL: PAINLEVEL_OUTOF10: MILD PAIN (2)

## 2024-11-14 NOTE — PROGRESS NOTES
Chief complaint: Patient presents with:  Musculoskeletal Problem: Right foot pain      History of Present Illness: This 54 year old female is seen for pain of the RIGHT foot pain.    She has pain at a lump on the RIGHT lateral foot. First step pain is the worst pain. She has noticed swelling on the bump during the day. She can usually get through her day with minimal discomfort, but she does sometimes notice pain in this area of her foot.    She does not have orthotics. She tried a mild valgus tilt pad in her shoe in the past which resolved her previous peroneal tendon pain, but she did not try orthotics because they were not covered by her insurance. She is looking for treatment options today for the bump pain on her RIGHT lateral foot.    No further pedal complaints today.       /83 (BP Location: Left arm, Patient Position: Sitting, Cuff Size: Adult Regular)   Pulse 81   Temp 97.6  F (36.4  C) (Tympanic)   LMP 2016 (LMP Unknown)   SpO2 94%     Patient Active Problem List   Diagnosis    Menorrhagia    Sinusitis, chronic    Prothrombin [id number] mutation (H)    S/P abdominoplasty    Irritable bowel syndrome with diarrhea    Iron deficiency    H/O deep venous thrombosis    Cervicalgia    Deviated nasal septum    Fibrocystic breast changes    Ulcer of esophagus    Biliary dyskinesia    Diverticulitis    Colon cancer screening    Prolapsed internal hemorrhoids    Other specified postprocedural states    Pain of foot    Arthralgia of hip    Personal history of other venous thrombosis and embolism    Shoulder pain    Other specified diseases of gallbladder    Abdominal pain    Nasal obstruction       Past Surgical History:   Procedure Laterality Date    ABDOMINOPLASTY       SECTION      x2    COLONOSCOPY - HIM SCAN  2011    Colonoscopy 2011    COLONOSCOPY - HIM SCAN  2018    Procedure:  Colonoscopy diagnostic with polypectomy and biopsies;  Surgeon:  Danelle Swift MD;  Location:   [facility] OR    COLONOSCOPY - HIM SCAN  05/05/2007    Colonoscopy, flex, diagnostic    DILATE CERVIX, HYSTEROSCOPY, ABLATE ENDOMETRIUM, COMBINED N/A 05/11/2016    Procedure: COMBINED DILATE CERVIX, HYSTEROSCOPY, ABLATE ENDOMETRIUM;  Surgeon: Pacheco Curry MD;  Location: [facility] OR    ESOPHAGOGASTRODUODENOSCOPY      negative    ESOPHAGOSCOPY, GASTROSCOPY, DUODENOSCOPY (EGD), COMBINED N/A 06/04/2020    Procedure: Upper Endoscopy with biopsy;  Surgeon: Son Cooper MD;  Location: [facility] OR    ORTHOPEDIC SURGERY Left     rotator cuff repair and bicep tendon repair    ZZC BREAST AUGMENTATION      ZZC LAP CHOLECYSTOSOMY         Current Outpatient Medications   Medication Sig Dispense Refill    bimatoprost (LATISSE) 0.03 % external opthalmic solution Apply 1 drop topically at bedtime 5 mL 3    cholecalciferol (VITAMIN D3) 25 mcg (1000 units) capsule Take 1 capsule by mouth daily      cyanocobalamin (VITAMIN B-12) 500 MCG SUBL sublingual tablet Place 500 mcg under the tongue daily      fluticasone (FLONASE) 50 MCG/ACT nasal spray SHAKE LIQUID AND USE 2 SPRAYS IN EACH NOSTRIL DAILY 16 g 0    RABEprazole (ACIPHEX) 20 MG EC tablet       biotin 1000 MCG TABS tablet Take 1,000 mcg by mouth daily (Patient not taking: Reported on 11/14/2024)       No current facility-administered medications for this visit.          Allergies   Allergen Reactions    Amoxicillin-Pot Clavulanate Diarrhea     C diff    Ciprofloxacin Unknown     Tingling in feet    Doxycycline Unknown    Flagyl [Metronidazole] Other (See Comments)    Food      Mangoes cause rash    Gluten Meal GI Disturbance    Sulfamethoxazole-Trimethoprim Rash       History reviewed. No pertinent family history.    Social History     Socioeconomic History    Marital status:      Spouse name: None    Number of children: None    Years of education: None    Highest education level: None   Occupational History    None   Tobacco Use    Smoking status: Former Smoker     Years: 3.00      Types: Cigarettes     Quit date:      Years since quittin.5    Smokeless tobacco: Never Used   Substance and Sexual Activity    Alcohol use: Yes     Alcohol/week: 0.0 standard drinks     Comment: Occassional    Drug use: No    Sexual activity: None   Other Topics Concern    Parent/sibling w/ CABG, MI or angioplasty before 65F 55M? Not Asked   Social History Narrative    None     Social Determinants of Health     Financial Resource Strain:     Difficulty of Paying Living Expenses:    Food Insecurity:     Worried About Running Out of Food in the Last Year:     Ran Out of Food in the Last Year:    Transportation Needs:     Lack of Transportation (Medical):     Lack of Transportation (Non-Medical):    Physical Activity:     Days of Exercise per Week:     Minutes of Exercise per Session:    Stress:     Feeling of Stress :    Social Connections:     Frequency of Communication with Friends and Family:     Frequency of Social Gatherings with Friends and Family:     Attends Anabaptism Services:     Active Member of Clubs or Organizations:     Attends Club or Organization Meetings:     Marital Status:    Intimate Partner Violence:     Fear of Current or Ex-Partner:     Emotionally Abused:     Physically Abused:     Sexually Abused:        ROS: 10 point ROS neg other than the symptoms noted above in the HPI.  EXAM  Constitutional: healthy, alert and no distress    Psychiatric: mentation appears normal and affect normal/bright    VASCULAR:  -Dorsalis pedis pulse +2/4 b/l  -Posterior tibial pulse +2/4 b/l  -Capillary refill time < 3 seconds to b/l hallux  NEURO:  -Light touch sensation intact to b/l plantar forefoot  DERM:  -Skin temperature, texture and turgor WNL b/l    MSK:  -Mild tenderness on palpation on the RIGHT proximal fifth metatarsal base at the peroneus brevis insertion.    -LEFT 1st MTPJ has mild bony prominence on the dorsal and medial aspect of the 1st metatarsal head  ---ROM limited to < 20 degrees  DORSIFLEXION with loading the forefot    -Mild PLANTARFLEXION of the DIPJ of the LEFT foot DIPJ    -Muscle strength of ankles +5/5 for dorsiflexion, plantarflexion, ABDUction and ADDuction b/l    -Ankle joint passive ROM within normal limits except for dorsiflexion:    Dorsiflexion, RIGHT Straight knee 0 degrees    Dorsiflexion, LEFT Straight knee 0 degrees    -Moderate decrease in arch height while patient is NWB    RIGHT FOOT RADIOGRAPH 07/08/2021  IMPRESSION: 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  ESVIN SOTO MD     RIGHT FOOT RADOGRAPHS 07/20/2023  IMPRESSION: No acute fracture. Old ununited fracture at the base of  the fifth metatarsal.       ESVIN SOTO MD   LEFT FOOT RADIOGRAPHS 08/22/2023  IMPRESSION: No acute fracture.    DARELL CHOWDARY MD     LEFT FOOT RADIOGRAPHS 11/15/2024  IMPRESSION: Corticated ossicle, base of fifth metatarsal, possibly an old ununited avulsion fracture however this may be an unfused apophysis. This was present on the prior study of 2021. There is some degenerative bony proliferation and articular narrowing where this articulates with the distal cuboid.  MICHEL ROSSI MD   ============================================================    ASSESSMENT:    (M77.41) Metatarsalgia of right foot  (primary encounter diagnosis)    (M76.71) Tendinitis of right peroneus brevis tendon      PLAN:  -Patient evaluated and examined. Treatment options discussed with no educational barriers noted.    Metatarsalgia and peroneal tendon pain:  -Discussed potential causes and treatment options for metatarsalgia. The bump she is feeling is the lateral aspect of the fifth  metatarsal base. The bump is normal and is supposed to be in that area of her foot, but the bump can become swollen with the peroneus brevis is inflamed at the insertion site. She previously had peroneal tendon pain just posterior to the lateral malleolus, but pain is now more localized to the  insertion site at the lateral fifth metatarsal base. 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  ---New radiographs obtained of the RIGHT foot on 11/15/2204: No concerning or new changes to the radiographs. Patient will be called with the results.    -Patient has a Dr. Olguin type of insert at home. She may consider cutting a small cut-out in the insert at the location of the fifth metatarsal base. She may cover the bump with a marking pen and step on the insert to check where the fifth metatarsal base rubs in her insert. She should then apply the valgus wedge tilt she has at home and wear this consistently. This should remove pain / strain on both the fifth metatarsal base as well as the peroneus brevis insertion. If this does not reduce pain, then she is advised to consider either a CMO or an OTC orthotic that is adjusted by an orthotist for proper adjustments.   -Patient is advised to more consistently wear supportive tennis shoes during the day. This involves wearing a stability shoe that bends at the toe, but has a solid midfoot shank and heel contour.   -Wear at least supportive sandals around the house and avoid barefoot walking.  -Ice the lateral foot when it is sore or painful.    -Will follow-up in six weeks to the lateral fifth metatarsal base pain and peroneal tendon insertion pain.  -This is an acute, uncomplicated illness/injury with OTC treatment options reviewed.     -Patient in agreement with the above treatment plan and all of patient's questions were answered.      Return to clinic six weeks to evaluate LEFT lateral fifth metatarsal base pain and peroneal tendon pain        Nya Bui DPM

## 2024-11-29 ENCOUNTER — LAB (OUTPATIENT)
Dept: LAB | Facility: OTHER | Age: 54
End: 2024-11-29
Payer: COMMERCIAL

## 2024-11-29 DIAGNOSIS — R19.7 DIARRHEA, UNSPECIFIED TYPE: ICD-10-CM

## 2024-11-29 LAB — C DIFF TOX B STL QL: NEGATIVE

## 2024-11-29 PROCEDURE — 87493 C DIFF AMPLIFIED PROBE: CPT

## 2025-04-01 ENCOUNTER — HOSPITAL ENCOUNTER (OUTPATIENT)
Dept: ULTRASOUND IMAGING | Facility: HOSPITAL | Age: 55
Discharge: HOME | End: 2025-04-01
Attending: FAMILY MEDICINE
Payer: COMMERCIAL

## 2025-04-01 ENCOUNTER — HOSPITAL ENCOUNTER (OUTPATIENT)
Dept: MAMMOGRAPHY | Facility: OTHER | Age: 55
Discharge: HOME | End: 2025-04-01
Attending: FAMILY MEDICINE
Payer: COMMERCIAL

## 2025-04-01 DIAGNOSIS — Z09 FOLLOW-UP EXAM, 3-6 MONTHS SINCE PREVIOUS EXAM: ICD-10-CM

## 2025-04-01 DIAGNOSIS — Z12.31 VISIT FOR SCREENING MAMMOGRAM: ICD-10-CM

## 2025-04-01 PROCEDURE — 76642 ULTRASOUND BREAST LIMITED: CPT | Mod: RT

## 2025-04-01 PROCEDURE — 76642 ULTRASOUND BREAST LIMITED: CPT | Mod: 26 | Performed by: RADIOLOGY

## 2025-04-08 NOTE — PATIENT INSTRUCTIONS
Try adding Pepcid if needing next couple of weeks with your Aciphex.  [place] diet - limit carbonation, caffeine, chocolate, tomato base, alcohol, over eating, late night eating.    If ongoing symptoms - refer for upper endoscopy.  If resolving - would consider using the Pepcid (H2 blocker) in place of Aciphex (PPI - proton pump inhibitor).  Safer longer term - risk of osteoporosis with PPI use.    Prevnar 20 vaccine given.  Labs today - will notify of results.    Colon 2021 - due 8/2026  Mammo 4/1/25 - right - cysts - benign - annual  Pap/hpv 2022 - due 9/2027.    Patient Education   Preventive Care Advice   This is general advice given by our system to help you stay healthy. However, your care team may have specific advice just for you. Please talk to your care team about your preventive care needs.  Nutrition  Eat 5 or more servings of fruits and vegetables each day.  Try wheat bread, brown rice and whole grain pasta (instead of white bread, rice, and pasta).  Get enough calcium and vitamin D. Check the label on foods and aim for 100% of the RDA (recommended daily allowance).  Lifestyle  Exercise at least 150 minutes each week  (30 minutes a day, 5 days a week).  Do muscle strengthening activities 2 days a week. These help control your weight and prevent disease.  No smoking.  Wear sunscreen to prevent skin cancer.  Have a dental exam and cleaning every 6 months.  Yearly exams  See your health care team every year to talk about:  Any changes in your health.  Any medicines your care team has prescribed.  Preventive care, family planning, and ways to prevent chronic diseases.  Shots (vaccines)   HPV shots (up to age 26), if you've never had them before.  Hepatitis B shots (up to age 59), if you've never had them before.  COVID-19 shot: Get this shot when it's due.  Flu shot: Get a flu shot every year.  Tetanus shot: Get a tetanus shot every 10 years.  Pneumococcal, hepatitis A, and RSV shots: Ask your care team if  you need these based on your risk.  Shingles shot (for age 50 and up)  General health tests  Diabetes screening:  Starting at age 35, Get screened for diabetes at least every 3 years.  If you are younger than age 35, ask your care team if you should be screened for diabetes.  Cholesterol test: At age 39, start having a cholesterol test every 5 years, or more often if advised.  Bone density scan (DEXA): At age 50, ask your care team if you should have this scan for osteoporosis (brittle bones).  Hepatitis C: Get tested at least once in your life.  STIs (sexually transmitted infections)  Before age 24: Ask your care team if you should be screened for STIs.  After age 24: Get screened for STIs if you're at risk. You are at risk for STIs (including HIV) if:  You are sexually active with more than one person.  You don't use condoms every time.  You or a partner was diagnosed with a sexually transmitted infection.  If you are at risk for HIV, ask about PrEP medicine to prevent HIV.  Get tested for HIV at least once in your life, whether you are at risk for HIV or not.  Cancer screening tests  Cervical cancer screening: If you have a cervix, begin getting regular cervical cancer screening tests starting at age 21.  Breast cancer scan (mammogram): If you've ever had breasts, begin having regular mammograms starting at age 40. This is a scan to check for breast cancer.  Colon cancer screening: It is important to start screening for colon cancer at age 45.  Have a colonoscopy test every 10 years (or more often if you're at risk) Or, ask your provider about stool tests like a FIT test every year or Cologuard test every 3 years.  To learn more about your testing options, visit:   .  For help making a decision, visit:   https://bit.ly/xr28487.  Prostate cancer screening test: If you have a prostate, ask your care team if a prostate cancer screening test (PSA) at age 55 is right for you.  Lung cancer screening: If you are a  current or former smoker ages 50 to 80, ask your care team if ongoing lung cancer screenings are right for you.  For informational purposes only. Not to replace the advice of your health care provider. Copyright   2023 [employer]. All rights reserved. Clinically reviewed by the [facility]. [employer] [id number] - REV 01/24.

## 2025-04-14 ENCOUNTER — OFFICE VISIT (OUTPATIENT)
Dept: FAMILY MEDICINE | Facility: OTHER | Age: 55
End: 2025-04-14
Attending: FAMILY MEDICINE
Payer: COMMERCIAL

## 2025-04-14 VITALS
WEIGHT: 163.9 LBS | OXYGEN SATURATION: 98 % | TEMPERATURE: 97.9 F | SYSTOLIC BLOOD PRESSURE: 104 MMHG | HEIGHT: 64 IN | BODY MASS INDEX: 27.98 KG/M2 | RESPIRATION RATE: 12 BRPM | DIASTOLIC BLOOD PRESSURE: 78 MMHG | HEART RATE: 68 BPM

## 2025-04-14 DIAGNOSIS — E78.5 HYPERLIPIDEMIA, UNSPECIFIED HYPERLIPIDEMIA TYPE: ICD-10-CM

## 2025-04-14 DIAGNOSIS — R73.09 ELEVATED GLUCOSE: ICD-10-CM

## 2025-04-14 DIAGNOSIS — Z00.00 ROUTINE GENERAL MEDICAL EXAMINATION AT A HEALTH CARE FACILITY: Primary | ICD-10-CM

## 2025-04-14 DIAGNOSIS — K21.9 GASTROESOPHAGEAL REFLUX DISEASE, UNSPECIFIED WHETHER ESOPHAGITIS PRESENT: ICD-10-CM

## 2025-04-14 LAB
ALBUMIN SERPL BCG-MCNC: 4.4 G/DL (ref 3.5–5.2)
ALP SERPL-CCNC: 87 U/L (ref 40–150)
ALT SERPL W P-5'-P-CCNC: 11 U/L (ref 0–50)
ANION GAP SERPL CALCULATED.3IONS-SCNC: 8 MMOL/L (ref 7–15)
AST SERPL W P-5'-P-CCNC: 18 U/L (ref 0–45)
BASOPHILS # BLD AUTO: 0 10E3/UL (ref 0–0.2)
BASOPHILS NFR BLD AUTO: 1 %
BILIRUB SERPL-MCNC: 0.8 MG/DL
BUN SERPL-MCNC: 11.5 MG/DL (ref 6–20)
CALCIUM SERPL-MCNC: 9.8 MG/DL (ref 8.8–10.4)
CHLORIDE SERPL-SCNC: 104 MMOL/L (ref 98–107)
CHOLEST SERPL-MCNC: 200 MG/DL
CREAT SERPL-MCNC: 0.8 MG/DL (ref 0.51–0.95)
CRP SERPL-MCNC: 3.06 MG/L
EGFRCR SERPLBLD CKD-EPI 2021: 85 ML/MIN/1.73M2
EOSINOPHIL # BLD AUTO: 0.2 10E3/UL (ref 0–0.7)
EOSINOPHIL NFR BLD AUTO: 3 %
ERYTHROCYTE [DISTWIDTH] IN BLOOD BY AUTOMATED COUNT: 13 % (ref 10–15)
EST. AVERAGE GLUCOSE BLD GHB EST-MCNC: 111 MG/DL
FASTING STATUS PATIENT QL REPORTED: YES
FASTING STATUS PATIENT QL REPORTED: YES
GLUCOSE SERPL-MCNC: 110 MG/DL (ref 70–99)
HBA1C MFR BLD: 5.5 %
HCO3 SERPL-SCNC: 30 MMOL/L (ref 22–29)
HCT VFR BLD AUTO: 45.4 % (ref 35–47)
HDLC SERPL-MCNC: 63 MG/DL
HGB BLD-MCNC: 15 G/DL (ref 11.7–15.7)
IMM GRANULOCYTES # BLD: 0 10E3/UL
IMM GRANULOCYTES NFR BLD: 0 %
LDLC SERPL CALC-MCNC: 119 MG/DL
LYMPHOCYTES # BLD AUTO: 1.9 10E3/UL (ref 0.8–5.3)
LYMPHOCYTES NFR BLD AUTO: 29 %
MCH RBC QN AUTO: 29.6 PG (ref 26.5–33)
MCHC RBC AUTO-ENTMCNC: 33 G/DL (ref 31.5–36.5)
MCV RBC AUTO: 90 FL (ref 78–100)
MONOCYTES # BLD AUTO: 0.6 10E3/UL (ref 0–1.3)
MONOCYTES NFR BLD AUTO: 9 %
NEUTROPHILS # BLD AUTO: 3.7 10E3/UL (ref 1.6–8.3)
NEUTROPHILS NFR BLD AUTO: 58 %
NONHDLC SERPL-MCNC: 137 MG/DL
NRBC # BLD AUTO: 0 10E3/UL
NRBC BLD AUTO-RTO: 0 /100
PLATELET # BLD AUTO: 326 10E3/UL (ref 150–450)
POTASSIUM SERPL-SCNC: 3.9 MMOL/L (ref 3.4–5.3)
PROT SERPL-MCNC: 7.3 G/DL (ref 6.4–8.3)
RBC # BLD AUTO: 5.07 10E6/UL (ref 3.8–5.2)
SODIUM SERPL-SCNC: 142 MMOL/L (ref 135–145)
TRIGL SERPL-MCNC: 89 MG/DL
WBC # BLD AUTO: 6.3 10E3/UL (ref 4–11)

## 2025-04-14 RX ORDER — TRIAMCINOLONE ACETONIDE 1 MG/G
CREAM TOPICAL 2 TIMES DAILY
COMMUNITY
Start: 2025-02-06

## 2025-04-14 SDOH — HEALTH STABILITY: PHYSICAL HEALTH: ON AVERAGE, HOW MANY DAYS PER WEEK DO YOU ENGAGE IN MODERATE TO STRENUOUS EXERCISE (LIKE A BRISK WALK)?: 2 DAYS

## 2025-04-14 ASSESSMENT — SOCIAL DETERMINANTS OF HEALTH (SDOH): HOW OFTEN DO YOU GET TOGETHER WITH FRIENDS OR RELATIVES?: ONCE A WEEK

## 2025-04-14 ASSESSMENT — PAIN SCALES - GENERAL: PAINLEVEL_OUTOF10: NO PAIN (0)

## 2025-04-14 NOTE — PROGRESS NOTES
"Preventive Care Visit  [facility]  Jenelle Rivera MD, Family Medicine  Apr 14, 2025      Assessment & Plan     Routine general medical examination at a health care facility  Preventative cares reviewed.  Vaccines discussed.  Prevnar 20 given.  Cancer screenings up to date.  Continue exercise. Encouraged strength training as well.    Gastroesophageal reflux disease, unspecified whether esophagitis present  Recent flare of symptoms.  Discussed PPI vs H2 blocker - risk of osteoporosis.  See patient instructions- short term increase use of medications - adding H2 to PPI  Consider repeat EGD if not improving.  Lifestyle modifications discussed.  Requested CRP lab. Discussed that it is non specific.  - CBC with platelets and differential; Future  - CRP, inflammation; Future    Hyperlipidemia, unspecified hyperlipidemia type  Mild prior.  Update fasting lab and pooled cohort today.  - Lipid Profile (Chol, Trig, HDL, LDL calc); Future  - Comprehensive metabolic panel (BMP + Alb, Alk Phos, ALT, AST, Total. Bili, TP); Future      The longitudinal plan of care for the diagnosis(es)/condition(s) as documented were addressed during this visit. Due to the added complexity in care, I will continue to support Veronica in the subsequent management and with ongoing continuity of care.    BMI  Estimated body mass index is 28.13 kg/m  as calculated from the following:    Height as of this encounter: 1.626 m (5' 4\").    Weight as of this encounter: 74.3 kg (163 lb 14.4 oz).   Weight management plan: Discussed healthy diet and exercise guidelines    Counseling  Appropriate preventive services were addressed with this patient via screening, questionnaire, or discussion as appropriate for fall prevention, nutrition, physical activity, Tobacco-use cessation, social engagement, weight loss and cognition.  Checklist reviewing preventive services available has been given to the patient.  Reviewed patient's diet, addressing concerns " "and/or questions.   She is at risk for lack of exercise and has been provided with information to increase physical activity for the benefit of her well-being.   She is at risk for psychosocial distress and has been provided with information to reduce risk.       See Patient Instructions    Return in about 53 weeks (around 4/20/2026) for Annual Wellness Visit.    Romeo Martin is a 55 year old, presenting for the following:  Physical and Gastrophageal Reflux        HPI    Vaccines - flu, covid, Prevnar 20 - agreeable to prevnar    Colon 2021 - due 8/2026  Mammo 4/1/25 - right - cysts - benign - annual  Pap/hpv 2022 - due 9/2027.    Carb reduction - down 5-10 pounds past year.  Gluten free.  Exercise - yard work, activities at home; tracks steps - 5000    Mild hyperlipidemia 2024 - .    Requesting CRP check.  Flare of reflux.  History of diverticulitis.  Slightly \"off bowels\".    Shoulder pain - follows at [facility] - had PRP injections past summer - was a \"game changer\".      GERD/Heartburn  Onset/Duration: Been flaring up for a week, even with medication - Aciphex  Description: Irritation, burping  Intensity: moderate  Progression of Symptoms: same  Accompanying Signs & Symptoms:  Does it feel like food gets stuck or trouble swallowing: YES- Feels like food gets stuck - just this past week  Nausea: No  Vomiting (bloody?): No  Abdominal Pain: YES- occasionaly  Black-Tarry stools: No  Bloody stools: No  History:  Previous similar episodes: YES  Previous ulcers: YES  Precipitating factors:   Caffeine use: No  Alcohol use: No  NSAID/Aspirin use: No  Tobacco use: No  Worse with fatty foods, spicy foods, and alcohol.  Alleviating factors: when stomach is empty  Therapies tried and outcome:             Lifestyle changes: None            Medications: Aciphex  2020 EGD negative   Belching more.  History of hiatal hernia.  Water even causes.      Advance Care Planning  Patient has a Health Care Directive on " file  Discussed advance care planning with patient.      4/14/2025   General Health   How would you rate your overall physical health? Good   Feel stress (tense, anxious, or unable to sleep) To some extent   (!) STRESS CONCERN      4/14/2025   Nutrition   Three or more servings of calcium each day? (!) NO   Diet: Gluten-free/reduced   How many servings of fruit and vegetables per day? (!) 2-3   How many sweetened beverages each day? 0-1         4/14/2025   Exercise   Days per week of moderate/strenous exercise 2 days   (!) EXERCISE CONCERN      4/14/2025   Social Factors   Frequency of gathering with friends or relatives Once a week   Worry food won't last until get money to buy more No   Food not last or not have enough money for food? No   Do you have housing? (Housing is defined as stable permanent housing and does not include staying ouside in a car, in a tent, in an abandoned building, in an overnight shelter, or couch-surfing.) Yes   Are you worried about losing your housing? No   Lack of transportation? No   Unable to get utilities (heat,electricity)? No         4/14/2025   Fall Risk   Fallen 2 or more times in the past year? No   Trouble with walking or balance? No          4/14/2025   Dental   Dentist two times every year? Yes           3/18/2024   TB Screening   Were you born outside of the [place]? No           Today's PHQ-2 Score:       4/14/2025     7:59 AM   PHQ-2 ( 1999 [employer])   Q1: Little interest or pleasure in doing things 0   Q2: Feeling down, depressed or hopeless 0   PHQ-2 Score 0    Q1: Little interest or pleasure in doing things Not at all   Q2: Feeling down, depressed or hopeless Not at all   PHQ-2 Score 0       Patient-reported           4/14/2025   Substance Use   Alcohol more than 3/day or more than 7/wk No   Do you use any other substances recreationally? No     Social History     Tobacco Use    Smoking status: Former     Current packs/day: 0.00     Types: Cigarettes     Start date: 1984      Quit date:      Years since quittin.3     Passive exposure: Past    Smokeless tobacco: Never   Vaping Use    Vaping status: Never Used   Substance Use Topics    Alcohol use: Yes     Comment: weekly    Drug use: No           2025   LAST FHS-7 RESULTS   1st degree relative breast or ovarian cancer No   Any relative bilateral breast cancer No   Any male have breast cancer No   Any ONE woman have BOTH breast AND ovarian cancer No   Any woman with breast cancer before 50yrs Yes   2 or more relatives with breast AND/OR ovarian cancer Yes   2 or more relatives with breast AND/OR bowel cancer Yes        Mammogram Screening - Mammogram every 1-2 years updated in Health Maintenance based on mutual decision making        2025   STI Screening   New sexual partner(s) since last STI/HIV test? No     History of abnormal Pap smear: No - age 30- 64 PAP with HPV every 5 years recommended        Latest Ref Rng & Units 2022    10:42 AM 10/10/2019    12:00 AM   PAP / HPV   PAP  Negative for Intraepithelial Lesion or Malignancy (NILM)     HPV 16 DNA Negative Negative     HPV 18 DNA Negative Negative     Other HR HPV Negative Negative     PAP-ABSTRACT   See Scanned Document           This result is from an external source.     ASCVD Risk   The 10-year ASCVD risk score (Ngozi MIRELES, et al., 2019) is: 1.6%    Values used to calculate the score:      Age: 55 years      Sex: Female      Is Non- : No      Diabetic: No      Tobacco smoker: No      Systolic Blood Pressure: 104 mmHg      Is BP treated: No      HDL Cholesterol: 62 mg/dL      Total Cholesterol: 214 mg/dL    Fracture Risk Assessment Tool  Link to Frax Calculator  Use the information below to complete the Frax calculator  : 1970  Sex: female  Weight (kg): 74.3 kg (actual weight)  Height (cm): 162.6 cm  Previous Fragility Fracture:  No  History of parent with fractured hip:  No  Current Smoking:  No  Patient has been on  "glucocorticoids for more than 3 months (5mg/day or more): No  Rheumatoid Arthritis on Problem List:  No  Secondary Osteoporosis on Problem List:  No  Consumes 3 or more units of alcohol per day: No  Femoral Neck BMD (g/cm2)           Reviewed and updated as needed this visit by Provider                    Past Medical History:   Diagnosis Date    Cervicalgia     Deviated nasal septum     Embolism and thrombosis (H)     unspecified site    Fibrocystic breast disease     Migraine     Prothrombin mutation     Sinusitis      Past Surgical History:   Procedure Laterality Date    ABDOMINOPLASTY       SECTION      x2    COLONOSCOPY - HIM SCAN  2011    Colonoscopy 2011    COLONOSCOPY - HIM SCAN  2018    Procedure:  Colonoscopy diagnostic with polypectomy and biopsies;  Surgeon:  Danelle Swift MD;  Location:  [facility]    COLONOSCOPY - HIM SCAN  2007    Colonoscopy, flex, diagnostic    DILATE CERVIX, HYSTEROSCOPY, ABLATE ENDOMETRIUM, COMBINED N/A 2016    Procedure: COMBINED DILATE CERVIX, HYSTEROSCOPY, ABLATE ENDOMETRIUM;  Surgeon: Pacheco Curry MD;  Location: [facility] OR    ESOPHAGOGASTRODUODENOSCOPY      negative    ESOPHAGOSCOPY, GASTROSCOPY, DUODENOSCOPY (EGD), COMBINED N/A 2020    Procedure: Upper Endoscopy with biopsy;  Surgeon: Son Cooper MD;  Location: [facility] OR    ORTHOPEDIC SURGERY Left     rotator cuff repair and bicep tendon repair    ZZC BREAST AUGMENTATION      ZZC LAP CHOLECYSTOSOMY           Review of Systems  Constitutional, HEENT, cardiovascular, pulmonary, gi and gu systems are negative, except as otherwise noted.     Objective    Exam  /78 (BP Location: Right arm, Patient Position: Sitting, Cuff Size: Adult Regular)   Pulse 68   Temp 97.9  F (36.6  C) (Tympanic)   Resp 12   Ht 1.626 m (5' 4\")   Wt 74.3 kg (163 lb 14.4 oz)   LMP 2016 (LMP Unknown)   SpO2 98%   BMI 28.13 kg/m     Estimated body mass index is 28.13 kg/m  as calculated from the " "following:    Height as of this encounter: 1.626 m (5' 4\").    Weight as of this encounter: 74.3 kg (163 lb 14.4 oz).    Physical Exam  GENERAL: alert and no distress  EYES: Eyes grossly normal to inspection, PERRL and conjunctivae and sclerae normal  HENT: ear canals and TM's normal, nose and mouth without ulcers or lesions  NECK: no adenopathy, no asymmetry, masses, or scars  RESP: lungs clear to auscultation - no rales, rhonchi or wheezes  BREAST: normal without masses, tenderness or nipple discharge and no palpable axillary masses or adenopathy  CV: regular rate and rhythm, normal S1 S2, no S3 or S4, no murmur, click or rub, no peripheral edema  ABDOMEN: soft, nontender, no hepatosplenomegaly, no masses and bowel sounds normal   (female): normal female external genitalia, normal urethral meatus, normal vaginal mucosa  MS: no gross musculoskeletal defects noted, no edema  SKIN: no suspicious lesions or rashes  NEURO: Normal strength and tone, mentation intact and speech normal  PSYCH: mentation appears normal, affect normal/bright        Signed Electronically by: Jenelle Rivera MD    "

## 2025-05-28 ENCOUNTER — OFFICE VISIT (OUTPATIENT)
Dept: FAMILY MEDICINE | Facility: OTHER | Age: 55
End: 2025-05-28
Attending: FAMILY MEDICINE
Payer: COMMERCIAL

## 2025-05-28 VITALS
HEIGHT: 64 IN | TEMPERATURE: 98.1 F | HEART RATE: 75 BPM | OXYGEN SATURATION: 98 % | BODY MASS INDEX: 27.47 KG/M2 | SYSTOLIC BLOOD PRESSURE: 100 MMHG | RESPIRATION RATE: 20 BRPM | WEIGHT: 160.9 LBS | DIASTOLIC BLOOD PRESSURE: 80 MMHG

## 2025-05-28 DIAGNOSIS — M25.552 HIP PAIN, LEFT: Primary | ICD-10-CM

## 2025-05-28 DIAGNOSIS — M54.50 LUMBOSACRAL PAIN: ICD-10-CM

## 2025-05-28 ASSESSMENT — PAIN SCALES - GENERAL: PAINLEVEL_OUTOF10: SEVERE PAIN (8)

## 2025-05-28 NOTE — PROGRESS NOTES
Assessment & Plan     Hip pain, left  More posterior in location; known arthritis of SI and hip - mild - but last imaging 4 years ago.  Clinic xray down - repair on for today - scheduled this patient for Friday.  Keep PT appointment as scheduled.  Ok to use the muscle relaxant she has at home sparingly for spasm.  Ok to use short term limited NSAID.  Last EGD negative.  Maximize topical and Tylenol.  Patient is interested in injections if able.  Start with xray and PT - then MRI - likely lumbosacral, not hip.  - XR LUMBAR SPINE 2/3 VIEWS (Clinic Performed); Future  - XR Hip Left 2-3 Views (Clinic Performed); Future    Lumbosacral pain  As above.  - XR LUMBAR SPINE 2/3 VIEWS (Clinic Performed); Future  - XR Hip Left 2-3 Views (Clinic Performed); Future    The longitudinal plan of care for the diagnosis(es)/condition(s) as documented were addressed during this visit. Due to the added complexity in care, I will continue to support Veronica in the subsequent management and with ongoing continuity of care.    Subjective   Veronica is a 55 year old, presenting for the following health issues:  left hip and back pain    History of Present Illness       Back Pain:  She presents for follow up of back pain. Patient's back pain is a new problem.    Original cause of back pain: not sure  First noticed back pain: 1-4 weeks ago  Patient feels back pain: dailyLocation of back pain:  Left lower back, left middle of back, left upper back, right side of neck, right shoulder, left shoulder, left buttock and left hip  Description of back pain: sharp  Back pain spreads: left buttocks, right shoulder, left shoulder and right side of neck    Since patient first noticed back pain, pain is: always present, but gets better and worse  Does back pain interfere with her job:  Not applicable  On a scale of 1-10 (10 being the worst), patient describes pain as:  8  What makes back pain worse: certain positions, lying down, sitting and twisting  "  Acupuncture: not tried  Acetaminophen: helpful  Activity or exercise: helpful  Chiropractor:  Not tried  Cold: not helpful  Heat: not tried  Massage: helpful  Muscle relaxants: not tried  NSAIDS: not tried  Opioids: not tried  Physical Therapy: not tried  Rest: not helpful  Steroid Injection: not tried  Stretching: not helpful  Surgery: not tried  TENS unit: not tried  Topical pain relievers: helpful  Other healthcare providers patient is seeing for back pain: None   She is taking medications regularly.         Musculoskeletal problem/pain - left hip and back  Duration: 3 weeks or so   Description  Location: left hip and back   Intensity:  moderate, 8/10  Accompanying signs and symptoms: none  History  Previous similar problem: YES  Previous evaluation:  unsure when the last time she had imaging completed   Precipitating or alleviating factors:  Trauma or overuse: no   Aggravating factors include: sitting, standing, and getting up in the morning   Therapies tried and outcome: massage and physical therapy   No inciting lenard; no injury; no change in activity  First time noticed it - stepped up on stair - has twinge  Pain in groin, deep and into back; not lateral  Does not radiate down leg  No weakness, numbness  No change in bowel/bladder  No fever  No bleeding  Tylenol  1 massage last week  Recent  - riding; gardening  Lifting grandkids  Scheduled with PT with Kayce CARLOS at [facility] 6/9/25  Last EGD 2020 - negative    Last xray 7/2021 - left hip - mild degenerative changes - hip as well as left SI joint.    Review of Systems  Constitutional, HEENT, cardiovascular, pulmonary, gi and gu systems are negative, except as otherwise noted.      Objective    /80 (BP Location: Right arm, Patient Position: Sitting, Cuff Size: Adult Regular)   Pulse 75   Temp 98.1  F (36.7  C) (Tympanic)   Resp 20   Ht 1.626 m (5' 4\")   Wt 73 kg (160 lb 14.4 oz)   LMP 04/11/2016 (LMP Unknown)   SpO2 98%   BMI 27.62 kg/m  "   Body mass index is 27.62 kg/m .  Physical Exam   GENERAL: alert and no distress  RESP: lungs clear to auscultation - no rales, rhonchi or wheezes  CV: regular rate and rhythm, normal S1 S2, no S3 or S4, no murmur, click or rub, no peripheral edema  MS: normal muscle tone, no edema, and tenderness to palpation left para lumbar and left SI joint; negative SLR bilaterally; minimal tenderness over greater trochanter, left; normal AROM hips; some discomfort with external rotation of hip, but not internal; figure 4 uncomfortable left, not right; asymmetry thoracic region - right para lumbar muscles more prominent than left  SKIN: no suspicious lesions or rashes  NEURO: Normal strength and tone, mentation intact and speech normal  PSYCH: mentation appears normal, affect normal/bright            Signed Electronically by: Jenelle Rivera MD

## 2025-05-28 NOTE — PATIENT INSTRUCTIONS
Xrays to be scheduled Friday - lumbar spine and left hip.  Keep PT as scheduled with Kayce.  Consider MRI next if planning for injections.    Maximize Tylenol up to 1000 mg 3 times daily; ice, heat, icy hot or Voltaren topically.    Short term limited use muscle relaxant and Ibuprofen.

## 2025-05-30 ENCOUNTER — RESULTS FOLLOW-UP (OUTPATIENT)
Dept: FAMILY MEDICINE | Facility: OTHER | Age: 55
End: 2025-05-30

## 2025-06-17 ENCOUNTER — DOCUMENTATION ONLY (OUTPATIENT)
Dept: OTHER | Facility: CLINIC | Age: 55
End: 2025-06-17

## 2025-07-09 ENCOUNTER — TELEPHONE (OUTPATIENT)
Dept: MAMMOGRAPHY | Facility: OTHER | Age: 55
End: 2025-07-09

## 2025-07-09 ENCOUNTER — TELEPHONE (OUTPATIENT)
Dept: FAMILY MEDICINE | Facility: OTHER | Age: 55
End: 2025-07-09

## 2025-07-09 NOTE — TELEPHONE ENCOUNTER
Patient called and stated that she has been having some tingling and itchiness in right breast for about two weeks. Told patient I would get in touch with her provider and give her a call back.     Zenobia HERNANDEZ RN

## 2025-07-09 NOTE — TELEPHONE ENCOUNTER
Called patient to let her know PCP wants to do a clinical exam. Gave patient phone number for scheduling.     Zenobia HERNANDEZ RN

## 2025-07-09 NOTE — TELEPHONE ENCOUNTER
12:56 PM    Reason for Call: OVERBOOK    Patient is having the following symptoms: Patient is needing to be seen  for breast concern. Per patient she talked to Zenobia in the breast center and was told  would like to see patient. Please advise on when patient can be seen. days.    The patient is requesting an appointment for Overbook with     Was an appointment offered for this call? Yes  If yes : Appointment type              Date  08/25    Preferred method for responding to this message: Telephone Call  What is your phone number ?  [phone]    If we cannot reach you directly, may we leave a detailed response at the number you provided? Yes    Can this message wait until your PCP/provider returns, if unavailable today? YES, Provider is in    Lupe Melvin

## 2025-07-09 NOTE — TELEPHONE ENCOUNTER
Spoke with patient. Right Nipple and areola itchy and tingly, she has noticed this concern over the past 2-3 weeks. Please advise.

## 2025-07-13 NOTE — PROGRESS NOTES
Veronica is a 51 year old who is being evaluated via a billable telephone visit.      What phone number would you like to be contacted at? [phone]  How would you like to obtain your AVS? MyChart    Assessment & Plan     Diarrhea, unspecified type  - Clostridium difficile Toxin B PCR; Future    Sigmoid diverticulitis      History of Clostridioides difficile colitis      Clinical diagnosis of COVID-19      Will consult virtually with Dr Swift 5/26/21.  GI consult 6/4/2021 - Dr Nava.  Complete course of antibiotics - Flagyl and Augmentin.   to  stool kit today or tomorrow to test for recurrence of C Diff.  Any stool testing available to identify covid colitis??  Will ask Dr Swift.       See Patient Instructions        Jenelle Rivera MD  [facility] [place]    Subjective   Veronica is a 51 year old who presents for the following health issues    HPI     Diarrhea  Onset/Duration: 1 day  Description:       Consistency of stool: loose       Blood in stool: no       Number of loose stools past 24 hours: 4  Progression of Symptoms: improving  Accompanying signs and symptoms:       Fever: no       Nausea/Vomiting: no       Abdominal pain: YES       Weight loss: no       Episodes of constipation: no  History   Ill contacts: no  Recent use of antibiotics: YES  Recent travels: no  Recent medication-new or changes(Rx or OTC): YES  Precipitating or alleviating factors: None  Therapies tried and outcome: none    Had positive COVID - on screen prior to scope.  Asymptomatic as far as new symptoms.    Will have phone visit 5/26/21 with Dr Swift.    Changed from Cipro/Flagyl to Flagyl Augmentin and added probiotic.    Stools had been ok past few days - then past day - watery stools.  Prior C diff - significant odor, glossy - which it.    No fevers.  No vomiting.   Nauseated.     Tendon/extremity - snapping has decreased, and tingling has resolved.    6/4/21 - Consult scheduled with   Brandy.        See extensive [employer] message.  Will copy below:    Here is a timeline of my digestive health problems for the past couple of months.       March or April 2021  Stopped drinking diet soda with caffeine.   Seemed to help IBS symptoms.     Fri. 3-12-21  Ate dinner at [employer] restaurant in [place], [place].   Had butterfly shrimp, baked potato and a denny.  The potato didn't look fresh & wasn't hot.  Didn't want to complain so I ate it anyway.     Sat. 3-   Woke up very early with vomiting, diarrhea & fever.  I figured it was food poisoning from dinner last night.  That lasted until Sunday but didn't feel well enough to back to work until Wednesday.     Tues. 3-16-21  Took a Covid test through [employer] (just incase I it wasn't food poisoning).     Thursday 3-  Covid test came back negative.  The same day I had my first dose if the Pfizer vaccine.     Wed. 3-  Physical with Dr. Ruiz  WBC 12.0  Absolute Neutrophil 8.2  Absolute Monocytes 1.1     End of March or early April  Started having some issues having a bowel movement.  Rectum felt constricted.       Tues. 4-6-21  Second dose if Pfizer vaccine.     Sat. 4-10-21  Felt bloated/not hungry at lunchtime.  Had friends over for dinner.  [place] exhausted and went to bed early.  Bloating and abdominal pain seemed to get worse overnight.     Sun. 4-11-21  Went to the ER in [place].  CT Scan showed diverticulitis of colon, cervical Nabothian cyst and cyst on right ovary.  ER doctor initially prescribed Augmentin.  When I got home I realized that Augmentin was what I had in February of 2020 and ended up with months of C. Diff.  Had prescription changed to 7 days of Metronidazole & Ciprofloxacin.  WBC 18.8  Absolute Neutrophil 15.5  Absolute Monocytes 1.6     Tues. 4-13-21  Appointment with Dr. Shari Albright as a follow up to ER visit.  She referred me to General Surgery department.    WBC 9.7  Absolute Neutrophil  7.3  Absolute Monocytes 1.0     Tues. 4-20-21  Appointment with Dr Stephan Swift.  I was feeling good that day.  We discussed sigmoid diverticulitis.  Scheduled a colonoscopy for May 20, 2021 and a Covid test for May 16, 2021.     Mon. 4-26-21  Appointment with Dr Ruiz.  Discussed feeling like the diverticulitis and rectal constriction might be coming back.  Also discussed the Nabothian cyst and ovarian cyst that showed on the CT scan from the 11th.   She ordered an ultrasound and referred me to Dr Pacheco Curry to follow up on the cysts.  WBC 12.4  Absolute Neutrophil 8.8  Absolute Monocytes 1.1     Wed. 4-28-21  New prescription for 7 days of Ciprofloxacin & Metronidazole.  Ultrasound to look at ovaries & cervix.     Thur. 4-29-21  Appointment with Dr. Curry to discuss CT scan and ultrasound findings.   He recommend a follow up ultrasound in 3 months to make sure that the cysts haven't grown.      Wed. 5-5-21  Appointment with Dr. Swift.  [place] pretty good.  He stated that if the diverticulitis comes back I would need to be admitted to the hospital for 2 days of IV antibiotics.  He would order a CT scan beforehand.     Wed. 5-12-21  Appointment with Dr. Swift.  I was feeling pretty other than some mild abdominal pain.     Fri. 5-14-21  Diverticulitis symptoms are coming back.  Called [facility] & left a message for Dr. Swift about getting a CT Scan.  Scheduling called and said that they could squeeze me in the following Friday.  They told me that the order didn't say STAT.  I was in [place] so I went to the [facility] ER.  I was seen by Dr. Tommy Jesus.  CT scan was performed and showed diverticulitis.  He was unable to get in touch with Dr. Swift.  He did consult an infectious disease doctor and 14 more days of Ciprofloxacin & Metronidazole were prescribed.  He referred me to Dr. Ngozi Nava (colorectal/general surgery).  Appointment is scheduled for June 4, 2021.  WBC 14.0  Absolute  Neutrophil 11.0  Absolute Monocytes 1.1     Sun. 5-16-21  Tested positive for Covid.  Are all of these current problems associated with Covid or the vaccine?     Fri. 5-21-21   That evening I felt a tingling sensation in both feet & lower calves that does not go away.  Had noticed brief instances of this earlier in the week but they were temporary.  Also experiencing a lot of joint cracking.  Shoulders, spine, wrists, knees and feet were affected.      Sat. 5-22-21  Went to Urgent Care at [facility].  Was seen by Michell Escobedo CNP.  She consulted the surgeon on call.  They surgeon did mention that this could be Covid Colitus.  She told me to stop taking Ciprofloxacin.  She will put it in my chart at an allergy.  She told me to keep taking the Metronidazole and after talking the pharmacist Augmentin + Florator.   She said she will send a message to Dr. Swift for a follow up the week of May 24, 2021.  I'm nervous about getting C. Diff again.       [place] May 23, 2021  Tingling sensation in feet and calves is gone.  Still experiencing joint cracking.  Guts are very noisy today especially after having something to eat.  Some brief sharp pains in abdomen.  Also burping even more than normal.    Diarrhea     Monday May 24, 2020  Diarrhea   Back pain, lower right side.      Review of Systems   Constitutional, HEENT, cardiovascular, pulmonary, gi and gu systems are negative, except as otherwise noted.      Objective           Vitals:  No vitals were obtained today due to virtual visit.    Physical Exam   healthy, alert and no distress  PSYCH: Alert and oriented times 3; coherent speech, normal   rate and volume, able to articulate logical thoughts, able   to abstract reason, no tangential thoughts, no hallucinations   or delusions  Her affect is normal  RESP: No cough, no audible wheezing, able to talk in full sentences  Remainder of exam unable to be completed due to telephone visits          Phone call  FAMILY HISTORY:  Father  Still living? Unknown  No family history of cardiac disease, Age at diagnosis: Age Unknown  No family history of cerebrovascular accident (CVA), Age at diagnosis: Age Unknown    Mother  Still living? Unknown  No family history of cardiac disease, Age at diagnosis: Age Unknown  No family history of cerebrovascular accident (CVA), Age at diagnosis: Age Unknown    Sibling  Still living? Unknown  No family history of cardiac disease, Age at diagnosis: Age Unknown  No family history of cerebrovascular accident (CVA), Age at diagnosis: Age Unknown     duration: 10:48 minutes

## 2025-07-14 NOTE — PROGRESS NOTES
Assessment & Plan     Breast pain, right  Onset weeks ago, wax/wane.  No other symptoms.  See above outlined history over past year - 2 mammograms and 3 ultrasounds.  MRI ordered by Dr Morillo, but not covered.  Insurance has changed since then.  Last saw Dr Morillo 6/2024.  Therefore, would suggest follow up visit to surgeon - reasonable to consider biopsy?  Vs trial of MRI ordering again with new insurance?  - adult general surgery referral    History of abnormal mammogram  Last imaging 4/2025 - negative.  Repeat annually was advised.    The longitudinal plan of care for the diagnosis(es)/condition(s) as documented were addressed during this visit. Due to the added complexity in care, I will continue to support Veronica in the subsequent management and with ongoing continuity of care.    Follow-up  Return if symptoms worsen or fail to improve.    Subjective   Veronica is a 55 year old, presenting for the following health issues:  Breast Problem (Right )        7/16/2025    12:58 PM   Additional Questions   Roomed by Niles Winn   Accompanied by None         7/16/2025    12:58 PM   Patient Reported Additional Medications   Patient reports taking the following new medications None     HPI      Breast Concern  Onset/Duration: 2-3 weeks ago; noted on self exam  Description:   Location: Right breast nipple/areola area   Pain or tenderness: No  Redness: No  Intensity: mild  Progression of Symptoms: same and constant  Accompanying Signs & Symptoms:  Any lumps in axillary region: No  Movable: YES  Nipple discharge: None   Changes in the skin or nipple: None   On Hormone therapy: No  Does it change with menstrual cycle: N/A  Previous history of similar problem:   First degree relative with breast cancer: a positive family history for breast cancer in her aunt(s).  2 Paternal aunt; also cousin, paternal; no other female cancers  Precipitating factors:           Worsened by: N/A  Alleviating factors:            Improved by:  N/A  Therapies tried and outcome: None  Patient's last menstrual period was 04/11/2016 (lmp unknown).    4/18/24 - mammo incomplete - then US done - probably benign -follow up 6 months  9/10/24 - right US - prior cluster resolved; 1 stable; nothing new; advised  months follow up  4/1/25 - diagnostic bilateral mammogram; incomplete - additional imaging needed;US right; negative; repeat annually.      Review of Systems  Constitutional, HEENT, cardiovascular, pulmonary, gi and gu systems are negative, except as otherwise noted.      Objective    /82 (BP Location: Right arm, Patient Position: Sitting, Cuff Size: Adult Large)   Pulse 79   Temp 97.9  F (36.6  C) (Tympanic)   Resp 12   Wt 74.6 kg (164 lb 6.4 oz)   LMP 04/11/2016 (LMP Unknown)   SpO2 96%   BMI 28.22 kg/m    Body mass index is 28.22 kg/m .  Physical Exam   GENERAL: alert and no distress  NECK: no adenopathy, no asymmetry, masses, or scars  RESP: lungs clear to auscultation - no rales, rhonchi or wheezes  BREAST: normal without masses or nipple discharge and no palpable axillary masses or adenopathy; right breast with tender area above nipple - few cm in size; she does have irregular, dense tissue, but no discrete mass palpated  CV: regular rate and rhythm, normal S1 S2, no S3 or S4, no murmur, click or rub, no peripheral edema  SKIN: no suspicious lesions or rashes  PSYCH: mentation appears normal, affect normal/bright            Signed Electronically by: Jenelle Rivera MD

## 2025-07-16 ENCOUNTER — OFFICE VISIT (OUTPATIENT)
Dept: FAMILY MEDICINE | Facility: OTHER | Age: 55
End: 2025-07-16
Attending: FAMILY MEDICINE
Payer: COMMERCIAL

## 2025-07-16 VITALS
SYSTOLIC BLOOD PRESSURE: 118 MMHG | WEIGHT: 164.4 LBS | BODY MASS INDEX: 28.22 KG/M2 | DIASTOLIC BLOOD PRESSURE: 82 MMHG | RESPIRATION RATE: 12 BRPM | TEMPERATURE: 97.9 F | HEART RATE: 79 BPM | OXYGEN SATURATION: 96 %

## 2025-07-16 DIAGNOSIS — Z87.898 HISTORY OF ABNORMAL MAMMOGRAM: ICD-10-CM

## 2025-07-16 DIAGNOSIS — N64.4 BREAST PAIN, RIGHT: Primary | ICD-10-CM

## 2025-07-16 ASSESSMENT — PAIN SCALES - GENERAL: PAINLEVEL_OUTOF10: NO PAIN (0)

## 2025-07-16 NOTE — Clinical Note
Just fyi - right breast pain - just had US/mammo 4/2025; you saw her 2026; mri not covered then ,but insurance has changed; no prior biopsy; sending back your way

## 2025-07-24 ENCOUNTER — OFFICE VISIT (OUTPATIENT)
Dept: SURGERY | Facility: OTHER | Age: 55
End: 2025-07-24
Attending: FAMILY MEDICINE
Payer: COMMERCIAL

## 2025-07-24 VITALS
HEIGHT: 64 IN | HEART RATE: 79 BPM | BODY MASS INDEX: 28.08 KG/M2 | OXYGEN SATURATION: 95 % | DIASTOLIC BLOOD PRESSURE: 85 MMHG | WEIGHT: 164.46 LBS | SYSTOLIC BLOOD PRESSURE: 124 MMHG | RESPIRATION RATE: 18 BRPM

## 2025-07-24 DIAGNOSIS — N64.4 BREAST PAIN, RIGHT: Primary | ICD-10-CM

## 2025-07-24 DIAGNOSIS — Z87.898 HISTORY OF ABNORMAL MAMMOGRAM: ICD-10-CM

## 2025-07-24 ASSESSMENT — PAIN SCALES - GENERAL: PAINLEVEL_OUTOF10: NO PAIN (0)

## 2025-07-30 ENCOUNTER — TELEPHONE (OUTPATIENT)
Dept: MRI IMAGING | Facility: HOSPITAL | Age: 55
End: 2025-07-30

## 2025-07-30 NOTE — TELEPHONE ENCOUNTER
Went over breast history questionnaire with patient. Transferred to scheduling.     Zenobia HERNANDEZ RN

## 2025-08-05 ENCOUNTER — HOSPITAL ENCOUNTER (OUTPATIENT)
Dept: MRI IMAGING | Facility: HOSPITAL | Age: 55
Discharge: HOME | End: 2025-08-05
Attending: SURGERY
Payer: COMMERCIAL

## 2025-08-05 DIAGNOSIS — N64.4 BREAST PAIN, RIGHT: ICD-10-CM

## 2025-08-05 PROCEDURE — 77049 MRI BREAST C-+ W/CAD BI: CPT

## 2025-08-05 PROCEDURE — 77049 MRI BREAST C-+ W/CAD BI: CPT | Mod: 26 | Performed by: STUDENT IN AN ORGANIZED HEALTH CARE EDUCATION/TRAINING PROGRAM

## 2025-08-05 PROCEDURE — 255N000002 HC RX 255 OP 636: Performed by: RADIOLOGY

## 2025-08-05 PROCEDURE — A9585 GADOBUTROL INJECTION: HCPCS | Performed by: RADIOLOGY

## 2025-08-05 RX ORDER — GADOBUTROL 604.72 MG/ML
7.5 INJECTION INTRAVENOUS ONCE
Status: COMPLETED | OUTPATIENT
Start: 2025-08-05 | End: 2025-08-05

## 2025-08-05 RX ADMIN — GADOBUTROL 7.5 ML: 604.72 INJECTION INTRAVENOUS at 11:21

## 2025-08-11 ENCOUNTER — TELEPHONE (OUTPATIENT)
Dept: MRI IMAGING | Facility: HOSPITAL | Age: 55
End: 2025-08-11

## (undated) DEVICE — LUBRICANT JELLY 2OZ. TUBE

## (undated) DEVICE — SYRINGE-30CC SLIP TIP

## (undated) DEVICE — FORCEP-COLON BIOPSY STD W/NEEDLE 160CM

## (undated) DEVICE — IRRIGATION-H2O 1000ML

## (undated) DEVICE — MOUTHPIECE W/GUARD FOR ENDOSCOPY

## (undated) DEVICE — CONNECTOR-ERBEFLO 2 PORT

## (undated) DEVICE — TUBING-SUCTION 20FT